# Patient Record
Sex: FEMALE | Race: WHITE | NOT HISPANIC OR LATINO | Employment: FULL TIME | URBAN - METROPOLITAN AREA
[De-identification: names, ages, dates, MRNs, and addresses within clinical notes are randomized per-mention and may not be internally consistent; named-entity substitution may affect disease eponyms.]

---

## 2017-01-05 ENCOUNTER — ALLSCRIPTS OFFICE VISIT (OUTPATIENT)
Dept: OTHER | Facility: OTHER | Age: 27
End: 2017-01-05

## 2017-01-06 ENCOUNTER — GENERIC CONVERSION - ENCOUNTER (OUTPATIENT)
Dept: OTHER | Facility: OTHER | Age: 27
End: 2017-01-06

## 2017-01-06 LAB — PROGESTERONE LEVEL (HISTORICAL): 7.5 NG/ML

## 2017-01-16 ENCOUNTER — ALLSCRIPTS OFFICE VISIT (OUTPATIENT)
Dept: OTHER | Facility: OTHER | Age: 27
End: 2017-01-16

## 2017-01-17 ENCOUNTER — GENERIC CONVERSION - ENCOUNTER (OUTPATIENT)
Dept: OTHER | Facility: OTHER | Age: 27
End: 2017-01-17

## 2017-01-17 LAB
HCG QUANTITATIVE (HISTORICAL): <2 MIU/ML
PROGESTERONE LEVEL (HISTORICAL): 14.3 NG/ML

## 2017-01-23 ENCOUNTER — ALLSCRIPTS OFFICE VISIT (OUTPATIENT)
Dept: OTHER | Facility: OTHER | Age: 27
End: 2017-01-23

## 2017-02-10 ENCOUNTER — ALLSCRIPTS OFFICE VISIT (OUTPATIENT)
Dept: OTHER | Facility: OTHER | Age: 27
End: 2017-02-10

## 2017-02-13 ENCOUNTER — LAB CONVERSION - ENCOUNTER (OUTPATIENT)
Dept: OTHER | Facility: OTHER | Age: 27
End: 2017-02-13

## 2017-02-13 LAB
CONTACT: (HISTORICAL): NORMAL
HCG QUANTITATIVE (HISTORICAL): <2 MIU/ML
PROGESTERONE LEVEL (HISTORICAL): 2.1 NG/ML
TEST INFORMATION (HISTORICAL): NORMAL
TEST NAME (HISTORICAL): NORMAL

## 2017-02-16 ENCOUNTER — GENERIC CONVERSION - ENCOUNTER (OUTPATIENT)
Dept: OTHER | Facility: OTHER | Age: 27
End: 2017-02-16

## 2017-02-21 ENCOUNTER — ALLSCRIPTS OFFICE VISIT (OUTPATIENT)
Dept: OTHER | Facility: OTHER | Age: 27
End: 2017-02-21

## 2017-02-22 LAB
HCG QUANTITATIVE (HISTORICAL): 114 MIU/ML
PROGESTERONE LEVEL (HISTORICAL): 32 NG/ML

## 2017-02-23 ENCOUNTER — ALLSCRIPTS OFFICE VISIT (OUTPATIENT)
Dept: OTHER | Facility: OTHER | Age: 27
End: 2017-02-23

## 2017-02-24 ENCOUNTER — GENERIC CONVERSION - ENCOUNTER (OUTPATIENT)
Dept: OTHER | Facility: OTHER | Age: 27
End: 2017-02-24

## 2017-02-24 LAB
HCG QUANTITATIVE (HISTORICAL): 431 MIU/ML
PROGESTERONE LEVEL (HISTORICAL): 28 NG/ML

## 2017-03-03 ENCOUNTER — LAB (OUTPATIENT)
Dept: LAB | Facility: MEDICAL CENTER | Age: 27
End: 2017-03-03
Payer: COMMERCIAL

## 2017-03-03 ENCOUNTER — TRANSCRIBE ORDERS (OUTPATIENT)
Dept: ADMINISTRATIVE | Facility: HOSPITAL | Age: 27
End: 2017-03-03

## 2017-03-03 DIAGNOSIS — Z32.01 PREGNANCY EXAMINATION OR TEST, POSITIVE RESULT: Primary | ICD-10-CM

## 2017-03-03 DIAGNOSIS — Z32.01 PREGNANCY EXAMINATION OR TEST, POSITIVE RESULT: ICD-10-CM

## 2017-03-03 LAB
HCG SERPL QL: POSITIVE
PROGEST SERPL-MCNC: 31.8 NG/ML

## 2017-03-03 PROCEDURE — 84144 ASSAY OF PROGESTERONE: CPT

## 2017-03-03 PROCEDURE — 36415 COLL VENOUS BLD VENIPUNCTURE: CPT

## 2017-03-03 PROCEDURE — 84702 CHORIONIC GONADOTROPIN TEST: CPT

## 2017-03-06 ENCOUNTER — GENERIC CONVERSION - ENCOUNTER (OUTPATIENT)
Dept: OTHER | Facility: OTHER | Age: 27
End: 2017-03-06

## 2017-03-06 LAB — B-HCG SERPL-ACNC: 7146 MIU/ML

## 2017-03-07 ENCOUNTER — GENERIC CONVERSION - ENCOUNTER (OUTPATIENT)
Dept: OTHER | Facility: OTHER | Age: 27
End: 2017-03-07

## 2017-03-07 DIAGNOSIS — O36.80X0 PREGNANCY WITH INCONCLUSIVE FETAL VIABILITY: ICD-10-CM

## 2017-03-22 ENCOUNTER — HOSPITAL ENCOUNTER (OUTPATIENT)
Dept: ULTRASOUND IMAGING | Facility: MEDICAL CENTER | Age: 27
Discharge: HOME | End: 2017-03-22
Payer: COMMERCIAL

## 2017-03-22 DIAGNOSIS — O36.80X0 PREGNANCY WITH INCONCLUSIVE FETAL VIABILITY: ICD-10-CM

## 2017-03-22 PROCEDURE — 76801 OB US < 14 WKS SINGLE FETUS: CPT

## 2017-03-24 ENCOUNTER — GENERIC CONVERSION - ENCOUNTER (OUTPATIENT)
Dept: OTHER | Facility: OTHER | Age: 27
End: 2017-03-24

## 2017-03-31 ENCOUNTER — ALLSCRIPTS OFFICE VISIT (OUTPATIENT)
Dept: OTHER | Facility: OTHER | Age: 27
End: 2017-03-31

## 2017-04-01 ENCOUNTER — LAB CONVERSION - ENCOUNTER (OUTPATIENT)
Dept: OTHER | Facility: OTHER | Age: 27
End: 2017-04-01

## 2017-04-01 LAB — CULTURE RESULT (HISTORICAL): NORMAL

## 2017-04-02 ENCOUNTER — LAB CONVERSION - ENCOUNTER (OUTPATIENT)
Dept: OTHER | Facility: OTHER | Age: 27
End: 2017-04-02

## 2017-04-02 LAB — CULTURE RESULT (HISTORICAL): NORMAL

## 2017-04-03 ENCOUNTER — LAB CONVERSION - ENCOUNTER (OUTPATIENT)
Dept: OTHER | Facility: OTHER | Age: 27
End: 2017-04-03

## 2017-04-03 LAB
AB SCRN, RBC W/RFLX ID,TITER,AG (HISTORICAL): ABNORMAL
ABO GROUP BLD: ABNORMAL
AMORPHOUS MATERIAL (HISTORICAL): ABNORMAL /HPF
BACTERIA UR QL AUTO: ABNORMAL /HPF
BASOPHILS # BLD AUTO: 1.3 %
BASOPHILS # BLD AUTO: 166 CELLS/UL (ref 0–200)
BILIRUB UR QL STRIP: NEGATIVE
COLOR UR: YELLOW
COMMENT (HISTORICAL): ABNORMAL
CULTURE RESULT (HISTORICAL): NORMAL
DEPRECATED RDW RBC AUTO: 14.6 % (ref 11–15)
EOSINOPHIL # BLD AUTO: 0.6 %
EOSINOPHIL # BLD AUTO: 77 CELLS/UL (ref 15–500)
FECAL OCCULT BLOOD DIAGNOSTIC (HISTORICAL): NEGATIVE
GLUCOSE (HISTORICAL): NEGATIVE
HCG QUANTITATIVE (HISTORICAL): ABNORMAL MIU/ML
HCT VFR BLD AUTO: 39.2 % (ref 35–45)
HEPATITIS B SURFACE ANTIGEN (HISTORICAL): ABNORMAL
HGB BLD-MCNC: 13.1 G/DL (ref 11.7–15.5)
HIV AG/AB, 4TH GEN (HISTORICAL): ABNORMAL
HYALINE CASTS #/AREA URNS LPF: ABNORMAL /LPF
KETONES UR STRIP-MCNC: NEGATIVE MG/DL
LEUKOCYTE ESTERASE UR QL STRIP: NEGATIVE
LYMPHOCYTES # BLD AUTO: 18.1 %
LYMPHOCYTES # BLD AUTO: 2317 CELLS/UL (ref 850–3900)
MCH RBC QN AUTO: 27.6 PG (ref 27–33)
MCHC RBC AUTO-ENTMCNC: 33.4 G/DL (ref 32–36)
MCV RBC AUTO: 82.6 FL (ref 80–100)
MONOCYTES # BLD AUTO: 691 CELLS/UL (ref 200–950)
MONOCYTES (HISTORICAL): 5.4 %
NEUTROPHILS # BLD AUTO: 74.6 %
NEUTROPHILS # BLD AUTO: 9549 CELLS/UL (ref 1500–7800)
NITRITE UR QL STRIP: NEGATIVE
PH UR STRIP.AUTO: 7 [PH] (ref 5–8)
PLATELET # BLD AUTO: 268 THOUSAND/UL (ref 140–400)
PMV BLD AUTO: 8.9 FL (ref 7.5–12.5)
PROGESTERONE LEVEL (HISTORICAL): 33.9 NG/ML
PROT UR STRIP-MCNC: NEGATIVE MG/DL
RBC # BLD AUTO: 4.75 MILLION/UL (ref 3.8–5.1)
RBC (HISTORICAL): ABNORMAL /HPF
RH BLD: ABNORMAL
RPR SCREEN (HISTORICAL): ABNORMAL
RUBELLA, IGG (HISTORICAL): 1.87 INDEX
SP GR UR STRIP.AUTO: 1.02 (ref 1–1.03)
SQUAMOUS EPITHELIAL CELLS (HISTORICAL): ABNORMAL /HPF
WBC # BLD AUTO: 12.8 THOUSAND/UL (ref 3.8–10.8)
WBC # BLD AUTO: ABNORMAL /HPF

## 2017-04-06 ENCOUNTER — GENERIC CONVERSION - ENCOUNTER (OUTPATIENT)
Dept: OTHER | Facility: OTHER | Age: 27
End: 2017-04-06

## 2017-04-24 ENCOUNTER — ALLSCRIPTS OFFICE VISIT (OUTPATIENT)
Dept: PERINATAL CARE | Facility: CLINIC | Age: 27
End: 2017-04-24
Payer: COMMERCIAL

## 2017-04-24 ENCOUNTER — GENERIC CONVERSION - ENCOUNTER (OUTPATIENT)
Dept: OTHER | Facility: OTHER | Age: 27
End: 2017-04-24

## 2017-04-24 PROCEDURE — 76813 OB US NUCHAL MEAS 1 GEST: CPT | Performed by: OBSTETRICS & GYNECOLOGY

## 2017-04-26 ENCOUNTER — LAB CONVERSION - ENCOUNTER (OUTPATIENT)
Dept: OTHER | Facility: OTHER | Age: 27
End: 2017-04-26

## 2017-04-26 LAB — CLINICAL COMMENT (HISTORICAL): NORMAL

## 2017-04-28 ENCOUNTER — LAB CONVERSION - ENCOUNTER (OUTPATIENT)
Dept: OTHER | Facility: OTHER | Age: 27
End: 2017-04-28

## 2017-04-28 LAB
AGE RISK DOWN SYNDROME (HISTORICAL): NORMAL
CALC'D GESTATIONAL AGE (HISTORICAL): 13
COLLECTION DATE (HISTORICAL): NORMAL
CROWN RUMP LENGTH (HISTORICAL): 70 MM
CROWN RUMP LENGTH (HISTORICAL): NORMAL MM
DATE OF BIRTH (HISTORICAL): NORMAL
DONOR AGE; EGG RETRIEVAL (HISTORICAL): NORMAL
DONOR EGG (HISTORICAL): NO
EDD DETERMINED BY (HISTORICAL): NORMAL
ESTIMATED DELIVERY DATE (EDD) (HISTORICAL): NORMAL
HCG MOM (HISTORICAL): 1.42
HCG QUANTITATIVE (HISTORICAL): 114.5 IU/ML
HX OF NEURAL TUBE DEFECTS (HISTORICAL): YES
IF TWINS (HISTORICAL): NORMAL
INSULIN DEP. DIABETIC (HISTORICAL): NO
INTERPRETATION (HISTORICAL): NORMAL
MATERNAL WEIGHT (HISTORICAL): 152 LBS
MSS DOWN SYNDROME RISK (HISTORICAL): NORMAL
MSS3 TRISOMY 18 RISK (HISTORICAL): NORMAL
NASAL BONE (HISTORICAL): NORMAL
NASAL BONE (HISTORICAL): PRESENT
NT MOM (HISTORICAL): 1.29
NTQR LOCATION ID (HISTORICAL): NORMAL
NTQR READING PHYS ID (HISTORICAL): NORMAL
NUCHAL TRANSLUCENCY (HISTORICAL): 2 MM
NUCHAL TRANSLUCENCY (HISTORICAL): NORMAL MM
NUMBER OF FETUSES (HISTORICAL): 1
PAPP-A (HISTORICAL): 1.25
PAPP-A (HISTORICAL): 1236.1 NG/ML
PREV PREGNANCY DOWN SYND (HISTORICAL): NO
RACE/ETHNIC ORIGIN (HISTORICAL): NORMAL
REFERRING PHYSICIAN (HISTORICAL): NORMAL
REFERRING PHYSICIAN NPI (HISTORICAL): NORMAL
REFERRING PHYSICIAN PHONE (HISTORICAL): NORMAL
REPEAT SPECIMEN (HISTORICAL): NO
ULTRASONOGRAPHER ID (HISTORICAL): NORMAL
ULTRASOUND DATE (HISTORICAL): NORMAL

## 2017-05-05 ENCOUNTER — GENERIC CONVERSION - ENCOUNTER (OUTPATIENT)
Dept: OTHER | Facility: OTHER | Age: 27
End: 2017-05-05

## 2017-05-23 ENCOUNTER — GENERIC CONVERSION - ENCOUNTER (OUTPATIENT)
Dept: OTHER | Facility: OTHER | Age: 27
End: 2017-05-23

## 2017-05-27 ENCOUNTER — LAB CONVERSION - ENCOUNTER (OUTPATIENT)
Dept: OTHER | Facility: OTHER | Age: 27
End: 2017-05-27

## 2017-05-27 LAB
AFP (HISTORICAL): 1.15
AFP (HISTORICAL): 43 NG/ML
AGE RISK DOWN SYNDROME (HISTORICAL): NORMAL
CALC'D GESTATIONAL AGE (HISTORICAL): 17
COLLECTION DATE (HISTORICAL): NORMAL
CROWN RUMP LENGTH (HISTORICAL): 70 MM
DATE OF BIRTH (HISTORICAL): NORMAL
ESTIMATED DELIVERY DATE (EDD) (HISTORICAL): NORMAL
ESTRADIOL, FREE (HISTORICAL): 0.59 NG/ML
ESTRIOL MOM (HISTORICAL): 0.55
HCG MOM (HISTORICAL): 1.7
HCG QUANTITATIVE (HISTORICAL): 48.9 IU/ML
HX OF NEURAL TUBE DEFECTS (HISTORICAL): YES
INHIBIN A (HISTORICAL): 162 PG/ML
INHIBIN A MOM (HISTORICAL): 0.98
INSULIN DEP. DIABETIC (HISTORICAL): NO
INTERPRETATION (HISTORICAL): NORMAL
MATERNAL WEIGHT (HISTORICAL): 149 LBS
MSAFP RISK OPEN NTD (HISTORICAL): NORMAL
MSS DOWN SYNDROME RISK (HISTORICAL): NORMAL
MSS3 TRISOMY 18 RISK (HISTORICAL): NORMAL
NASAL BONE (HISTORICAL): NORMAL
NASAL BONE (HISTORICAL): PRESENT
NT MOM (HISTORICAL): 1.29
NUCHAL TRANSLUCENCY (HISTORICAL): 2 MM
NUMBER OF FETUSES (HISTORICAL): 1
PAPP-A (HISTORICAL): 1.22
PAPP-A (HISTORICAL): 1236.1 NG/ML
RACE/ETHNIC ORIGIN (HISTORICAL): NORMAL
REFERRING PHYSICIAN (HISTORICAL): NORMAL
REFERRING PHYSICIAN NPI (HISTORICAL): NORMAL
REFERRING PHYSICIAN PHONE (HISTORICAL): NORMAL
REPEAT SPECIMEN (HISTORICAL): NO
SPECIMEN: (HISTORICAL): NORMAL
ULTRASOUND DATE (HISTORICAL): NORMAL

## 2017-06-05 ENCOUNTER — GENERIC CONVERSION - ENCOUNTER (OUTPATIENT)
Dept: OTHER | Facility: OTHER | Age: 27
End: 2017-06-05

## 2017-06-10 ENCOUNTER — LAB CONVERSION - ENCOUNTER (OUTPATIENT)
Dept: OTHER | Facility: OTHER | Age: 27
End: 2017-06-10

## 2017-06-10 LAB — CULTURE RESULT (HISTORICAL): NORMAL

## 2017-06-15 ENCOUNTER — ALLSCRIPTS OFFICE VISIT (OUTPATIENT)
Dept: PERINATAL CARE | Facility: CLINIC | Age: 27
End: 2017-06-15
Payer: COMMERCIAL

## 2017-06-15 ENCOUNTER — GENERIC CONVERSION - ENCOUNTER (OUTPATIENT)
Dept: OTHER | Facility: OTHER | Age: 27
End: 2017-06-15

## 2017-06-15 PROCEDURE — 76817 TRANSVAGINAL US OBSTETRIC: CPT | Performed by: OBSTETRICS & GYNECOLOGY

## 2017-06-15 PROCEDURE — 76805 OB US >/= 14 WKS SNGL FETUS: CPT | Performed by: OBSTETRICS & GYNECOLOGY

## 2017-06-20 ENCOUNTER — GENERIC CONVERSION - ENCOUNTER (OUTPATIENT)
Dept: OTHER | Facility: OTHER | Age: 27
End: 2017-06-20

## 2017-07-18 ENCOUNTER — GENERIC CONVERSION - ENCOUNTER (OUTPATIENT)
Dept: OTHER | Facility: OTHER | Age: 27
End: 2017-07-18

## 2017-08-08 ENCOUNTER — GENERIC CONVERSION - ENCOUNTER (OUTPATIENT)
Dept: OTHER | Facility: OTHER | Age: 27
End: 2017-08-08

## 2017-08-08 ENCOUNTER — ALLSCRIPTS OFFICE VISIT (OUTPATIENT)
Dept: OTHER | Facility: OTHER | Age: 27
End: 2017-08-08

## 2017-08-09 ENCOUNTER — LAB CONVERSION - ENCOUNTER (OUTPATIENT)
Dept: OTHER | Facility: OTHER | Age: 27
End: 2017-08-09

## 2017-08-09 LAB — GLUCOSE 1 HR 50 GM GLUC CHALLENGE-PREG PTS (HISTORICAL): 128 MG/DL

## 2017-08-10 ENCOUNTER — LAB CONVERSION - ENCOUNTER (OUTPATIENT)
Dept: OTHER | Facility: OTHER | Age: 27
End: 2017-08-10

## 2017-08-10 LAB
BASOPHILS # BLD AUTO: 0.4 %
BASOPHILS # BLD AUTO: 36 CELLS/UL (ref 0–200)
DEPRECATED RDW RBC AUTO: 13.1 % (ref 11–15)
EOSINOPHIL # BLD AUTO: 1.1 %
EOSINOPHIL # BLD AUTO: 100 CELLS/UL (ref 15–500)
GLUCOSE 1 HR 50 GM GLUC CHALLENGE-PREG PTS (HISTORICAL): 128 MG/DL
HCT VFR BLD AUTO: 38.6 % (ref 35–45)
HGB BLD-MCNC: 12.7 G/DL (ref 11.7–15.5)
LYMPHOCYTES # BLD AUTO: 1884 CELLS/UL (ref 850–3900)
LYMPHOCYTES # BLD AUTO: 20.7 %
MCH RBC QN AUTO: 28.5 PG (ref 27–33)
MCHC RBC AUTO-ENTMCNC: 32.9 G/DL (ref 32–36)
MCV RBC AUTO: 86.5 FL (ref 80–100)
MONOCYTES # BLD AUTO: 400 CELLS/UL (ref 200–950)
MONOCYTES (HISTORICAL): 4.4 %
NEUTROPHILS # BLD AUTO: 6679 CELLS/UL (ref 1500–7800)
NEUTROPHILS # BLD AUTO: 73.4 %
PLATELET # BLD AUTO: 264 THOUSAND/UL (ref 140–400)
PMV BLD AUTO: 10.3 FL (ref 7.5–12.5)
RBC # BLD AUTO: 4.46 MILLION/UL (ref 3.8–5.1)
RBC MORPHOLOGY (HISTORICAL): NORMAL
WBC # BLD AUTO: 9.1 THOUSAND/UL (ref 3.8–10.8)

## 2017-08-24 ENCOUNTER — GENERIC CONVERSION - ENCOUNTER (OUTPATIENT)
Dept: OTHER | Facility: OTHER | Age: 27
End: 2017-08-24

## 2017-09-06 ENCOUNTER — GENERIC CONVERSION - ENCOUNTER (OUTPATIENT)
Dept: OTHER | Facility: OTHER | Age: 27
End: 2017-09-06

## 2017-09-06 ENCOUNTER — ALLSCRIPTS OFFICE VISIT (OUTPATIENT)
Dept: OTHER | Facility: OTHER | Age: 27
End: 2017-09-06

## 2017-09-07 ENCOUNTER — ALLSCRIPTS OFFICE VISIT (OUTPATIENT)
Dept: PERINATAL CARE | Facility: CLINIC | Age: 27
End: 2017-09-07
Payer: COMMERCIAL

## 2017-09-07 ENCOUNTER — GENERIC CONVERSION - ENCOUNTER (OUTPATIENT)
Dept: OTHER | Facility: OTHER | Age: 27
End: 2017-09-07

## 2017-09-07 PROCEDURE — 76816 OB US FOLLOW-UP PER FETUS: CPT | Performed by: OBSTETRICS & GYNECOLOGY

## 2017-09-19 ENCOUNTER — GENERIC CONVERSION - ENCOUNTER (OUTPATIENT)
Dept: OTHER | Facility: OTHER | Age: 27
End: 2017-09-19

## 2017-09-25 ENCOUNTER — GENERIC CONVERSION - ENCOUNTER (OUTPATIENT)
Dept: OTHER | Facility: OTHER | Age: 27
End: 2017-09-25

## 2017-09-26 ENCOUNTER — GENERIC CONVERSION - ENCOUNTER (OUTPATIENT)
Dept: OTHER | Facility: OTHER | Age: 27
End: 2017-09-26

## 2017-09-27 ENCOUNTER — GENERIC CONVERSION - ENCOUNTER (OUTPATIENT)
Dept: OTHER | Facility: OTHER | Age: 27
End: 2017-09-27

## 2017-10-05 ENCOUNTER — ALLSCRIPTS OFFICE VISIT (OUTPATIENT)
Dept: OTHER | Facility: OTHER | Age: 27
End: 2017-10-05

## 2017-10-10 ENCOUNTER — GENERIC CONVERSION - ENCOUNTER (OUTPATIENT)
Dept: OTHER | Facility: OTHER | Age: 27
End: 2017-10-10

## 2017-10-17 ENCOUNTER — GENERIC CONVERSION - ENCOUNTER (OUTPATIENT)
Dept: OTHER | Facility: OTHER | Age: 27
End: 2017-10-17

## 2017-10-24 ENCOUNTER — GENERIC CONVERSION - ENCOUNTER (OUTPATIENT)
Dept: OTHER | Facility: OTHER | Age: 27
End: 2017-10-24

## 2017-10-24 ENCOUNTER — HOSPITAL ENCOUNTER (OUTPATIENT)
Facility: HOSPITAL | Age: 27
Discharge: HOME | End: 2017-10-24
Attending: OBSTETRICS & GYNECOLOGY | Admitting: OBSTETRICS & GYNECOLOGY
Payer: COMMERCIAL

## 2017-10-24 VITALS
TEMPERATURE: 98.1 F | RESPIRATION RATE: 18 BRPM | SYSTOLIC BLOOD PRESSURE: 123 MMHG | DIASTOLIC BLOOD PRESSURE: 64 MMHG | HEART RATE: 88 BPM | HEIGHT: 63 IN | BODY MASS INDEX: 31.89 KG/M2 | WEIGHT: 180 LBS

## 2017-10-24 LAB
ALBUMIN SERPL BCP-MCNC: 2.8 G/DL (ref 3.5–5)
ALP SERPL-CCNC: 157 U/L (ref 46–116)
ALT SERPL W P-5'-P-CCNC: 23 U/L (ref 12–78)
AMORPH PHOS CRY URNS QL MICRO: ABNORMAL /HPF
ANION GAP SERPL CALCULATED.3IONS-SCNC: 9 MMOL/L (ref 4–13)
AST SERPL W P-5'-P-CCNC: 24 U/L (ref 5–45)
BACTERIA UR QL AUTO: ABNORMAL /HPF
BASOPHILS # BLD AUTO: 0.03 THOUSANDS/ΜL (ref 0–0.1)
BASOPHILS NFR BLD AUTO: 0 % (ref 0–1)
BILIRUB SERPL-MCNC: 0.23 MG/DL (ref 0.2–1)
BILIRUB UR QL STRIP: NEGATIVE
BUN SERPL-MCNC: 11 MG/DL (ref 5–25)
CALCIUM SERPL-MCNC: 9.3 MG/DL (ref 8.3–10.1)
CHLORIDE SERPL-SCNC: 102 MMOL/L (ref 100–108)
CLARITY UR: CLEAR
CO2 SERPL-SCNC: 23 MMOL/L (ref 21–32)
COLOR UR: YELLOW
CREAT SERPL-MCNC: 0.71 MG/DL (ref 0.6–1.3)
CREAT UR-MCNC: 88.9 MG/DL
EOSINOPHIL # BLD AUTO: 0.07 THOUSAND/ΜL (ref 0–0.61)
EOSINOPHIL NFR BLD AUTO: 1 % (ref 0–6)
ERYTHROCYTE [DISTWIDTH] IN BLOOD BY AUTOMATED COUNT: 13.9 % (ref 11.6–15.1)
GFR SERPL CREATININE-BSD FRML MDRD: 115 ML/MIN/1.73SQ M
GLUCOSE SERPL-MCNC: 106 MG/DL (ref 65–140)
GLUCOSE UR STRIP-MCNC: NEGATIVE MG/DL
HCT VFR BLD AUTO: 41.2 % (ref 34.8–46.1)
HGB BLD-MCNC: 13.6 G/DL (ref 11.5–15.4)
HGB UR QL STRIP.AUTO: ABNORMAL
KETONES UR STRIP-MCNC: NEGATIVE MG/DL
LEUKOCYTE ESTERASE UR QL STRIP: NEGATIVE
LYMPHOCYTES # BLD AUTO: 1.87 THOUSANDS/ΜL (ref 0.6–4.47)
LYMPHOCYTES NFR BLD AUTO: 19 % (ref 14–44)
MCH RBC QN AUTO: 28.6 PG (ref 26.8–34.3)
MCHC RBC AUTO-ENTMCNC: 33 G/DL (ref 31.4–37.4)
MCV RBC AUTO: 87 FL (ref 82–98)
MONOCYTES # BLD AUTO: 0.59 THOUSAND/ΜL (ref 0.17–1.22)
MONOCYTES NFR BLD AUTO: 6 % (ref 4–12)
NEUTROPHILS # BLD AUTO: 7.32 THOUSANDS/ΜL (ref 1.85–7.62)
NEUTS SEG NFR BLD AUTO: 74 % (ref 43–75)
NITRITE UR QL STRIP: NEGATIVE
NON-SQ EPI CELLS URNS QL MICRO: ABNORMAL /HPF
NRBC BLD AUTO-RTO: 0 /100 WBCS
PH UR STRIP.AUTO: 7 [PH] (ref 4.5–8)
PLATELET # BLD AUTO: 243 THOUSANDS/UL (ref 149–390)
PMV BLD AUTO: 10.5 FL (ref 8.9–12.7)
POTASSIUM SERPL-SCNC: 3.9 MMOL/L (ref 3.5–5.3)
PROT SERPL-MCNC: 7.1 G/DL (ref 6.4–8.2)
PROT UR STRIP-MCNC: NEGATIVE MG/DL
PROT UR-MCNC: 10 MG/DL
PROT/CREAT UR: 0.11 MG/G{CREAT} (ref 0–0.1)
RBC # BLD AUTO: 4.76 MILLION/UL (ref 3.81–5.12)
RBC #/AREA URNS AUTO: ABNORMAL /HPF
SODIUM SERPL-SCNC: 134 MMOL/L (ref 136–145)
SP GR UR STRIP.AUTO: 1.01 (ref 1–1.03)
URATE SERPL-MCNC: 4.6 MG/DL (ref 2–6.8)
UROBILINOGEN UR QL STRIP.AUTO: 0.2 E.U./DL
WBC # BLD AUTO: 9.88 THOUSAND/UL (ref 4.31–10.16)
WBC #/AREA URNS AUTO: ABNORMAL /HPF

## 2017-10-24 PROCEDURE — 82570 ASSAY OF URINE CREATININE: CPT | Performed by: OBSTETRICS & GYNECOLOGY

## 2017-10-24 PROCEDURE — 99213 OFFICE O/P EST LOW 20 MIN: CPT

## 2017-10-24 PROCEDURE — 85025 COMPLETE CBC W/AUTO DIFF WBC: CPT | Performed by: OBSTETRICS & GYNECOLOGY

## 2017-10-24 PROCEDURE — 81001 URINALYSIS AUTO W/SCOPE: CPT | Performed by: OBSTETRICS & GYNECOLOGY

## 2017-10-24 PROCEDURE — 80053 COMPREHEN METABOLIC PANEL: CPT | Performed by: OBSTETRICS & GYNECOLOGY

## 2017-10-24 PROCEDURE — 84156 ASSAY OF PROTEIN URINE: CPT | Performed by: OBSTETRICS & GYNECOLOGY

## 2017-10-24 PROCEDURE — 84550 ASSAY OF BLOOD/URIC ACID: CPT | Performed by: OBSTETRICS & GYNECOLOGY

## 2017-10-24 RX ORDER — PNV NO.95/FERROUS FUM/FOLIC AC 28MG-0.8MG
1 TABLET ORAL DAILY
Status: ON HOLD | COMMUNITY
End: 2017-10-25 | Stop reason: SDUPTHER

## 2017-10-24 NOTE — PROGRESS NOTES
L&D Triage Note - OB/GYN  Cristian Saavedra 32 y o  female MRN: [id number]  Unit/Bed#: L&D 326-01 Encounter: [id number]      Assessment:  32 y o   at 39w0d without evidence of preeclampsia  Plan:  1  Discharged to home with precautions  2  Follow up as outpatient     DW Dr Hilario Walsh  ______________________________________________________________________      Chief Compliant: high BP in office    TIME: 8937  Subjective:  32 y o  Nilsa Joy at 39w0d presents from her OB's office with elevated BPs (140s/90s x2)  Patient has no known hx of HTN and her BP had previously been WNL in office  At present, she denies headaches, visual changes, and RUQ pain  She also denies regular ctxns, lof, vb  +FM  Objective:  Vitals:    10/24/17 1205   BP: 131/72   Pulse: 87   Resp: 18   Temp: 98 1 °F (36 7 °C)       FHT:  135 / Moderate 6 - 25 bpm / +accels, reactive  [place]: q6-9min    A/P: 32 y o   at 39w0d with new onset HTN  Asymptomatic  1  CBC, CMP, uric acid, UA, urine protein:creatinine  2  BP monitoring          TIME: 1400  Subjective:  No new symptoms      Objective:  Temp:  [98 1 °F (36 7 °C)] 98 1 °F (36 7 °C)  HR:  [87] 87  Resp:  [18] 18  BP: (131)/(72) 131/72       FHT:  125 / Moderate 6 - 25 bpm / +accels, reactive  [place]: irregular    Lab Results   Component Value Date    WBC 9 88 10/24/2017    HGB 13 6 10/24/2017    HCT 41 2 10/24/2017    MCV 87 10/24/2017     10/24/2017     Lab Results   Component Value Date    GLUCOSE 106 10/24/2017    CALCIUM 9 3 10/24/2017     (L) 10/24/2017    K 3 9 10/24/2017    CO2 23 10/24/2017     10/24/2017    BUN 11 10/24/2017    CREATININE 0 71 10/24/2017     Lab Results   Component Value Date    ALT 23 10/24/2017    AST 24 10/24/2017    ALKPHOS 157 (H) 10/24/2017    BILITOT 0 23 10/24/2017     UA: neg protein          Cruzito Feldman MD 10/24/2017 1:59 PM

## 2017-10-25 ENCOUNTER — ANESTHESIA EVENT (INPATIENT)
Dept: LABOR AND DELIVERY | Facility: HOSPITAL | Age: 27
End: 2017-10-25
Payer: COMMERCIAL

## 2017-10-25 ENCOUNTER — ANESTHESIA (INPATIENT)
Dept: LABOR AND DELIVERY | Facility: HOSPITAL | Age: 27
End: 2017-10-25
Payer: COMMERCIAL

## 2017-10-25 ENCOUNTER — HOSPITAL ENCOUNTER (INPATIENT)
Facility: HOSPITAL | Age: 27
LOS: 2 days | Discharge: HOME | End: 2017-10-27
Attending: OBSTETRICS & GYNECOLOGY | Admitting: OBSTETRICS & GYNECOLOGY
Payer: COMMERCIAL

## 2017-10-25 DIAGNOSIS — O13.9 GESTATIONAL HYPERTENSION: ICD-10-CM

## 2017-10-25 DIAGNOSIS — Z3A.39 39 WEEKS GESTATION OF PREGNANCY: Primary | ICD-10-CM

## 2017-10-25 LAB
ABO GROUP BLD: NORMAL
ALBUMIN SERPL BCP-MCNC: 2.8 G/DL (ref 3.5–5)
ALP SERPL-CCNC: 152 U/L (ref 46–116)
ALT SERPL W P-5'-P-CCNC: 18 U/L (ref 12–78)
ANION GAP SERPL CALCULATED.3IONS-SCNC: 9 MMOL/L (ref 4–13)
AST SERPL W P-5'-P-CCNC: 22 U/L (ref 5–45)
BACTERIA UR QL AUTO: ABNORMAL /HPF
BASE EXCESS BLDCOA CALC-SCNC: -3.3 MMOL/L (ref 3–11)
BASE EXCESS BLDCOV CALC-SCNC: -2.7 MMOL/L (ref 1–9)
BASOPHILS # BLD AUTO: 0.02 THOUSANDS/ΜL (ref 0–0.1)
BASOPHILS NFR BLD AUTO: 0 % (ref 0–1)
BILIRUB SERPL-MCNC: 0.25 MG/DL (ref 0.2–1)
BILIRUB UR QL STRIP: NEGATIVE
BLD GP AB SCN SERPL QL: NEGATIVE
BUN SERPL-MCNC: 11 MG/DL (ref 5–25)
CALCIUM SERPL-MCNC: 9.6 MG/DL (ref 8.3–10.1)
CHLORIDE SERPL-SCNC: 104 MMOL/L (ref 100–108)
CLARITY UR: ABNORMAL
CO2 SERPL-SCNC: 22 MMOL/L (ref 21–32)
COLOR UR: YELLOW
CREAT SERPL-MCNC: 0.68 MG/DL (ref 0.6–1.3)
CREAT UR-MCNC: 117 MG/DL
EOSINOPHIL # BLD AUTO: 0.12 THOUSAND/ΜL (ref 0–0.61)
EOSINOPHIL NFR BLD AUTO: 1 % (ref 0–6)
ERYTHROCYTE [DISTWIDTH] IN BLOOD BY AUTOMATED COUNT: 13.9 % (ref 11.6–15.1)
EXTERNAL GROUP B STREP ANTIGEN: NEGATIVE
GFR SERPL CREATININE-BSD FRML MDRD: 119 ML/MIN/1.73SQ M
GLUCOSE SERPL-MCNC: 85 MG/DL (ref 65–140)
GLUCOSE UR STRIP-MCNC: NEGATIVE MG/DL
HCO3 BLDCOA-SCNC: 26 MMOL/L (ref 17.3–27.3)
HCO3 BLDCOV-SCNC: 23.9 MMOL/L (ref 12.2–28.6)
HCT VFR BLD AUTO: 38.3 % (ref 34.8–46.1)
HGB BLD-MCNC: 12.9 G/DL (ref 11.5–15.4)
HGB UR QL STRIP.AUTO: ABNORMAL
KETONES UR STRIP-MCNC: NEGATIVE MG/DL
LEUKOCYTE ESTERASE UR QL STRIP: ABNORMAL
LYMPHOCYTES # BLD AUTO: 2 THOUSANDS/ΜL (ref 0.6–4.47)
LYMPHOCYTES NFR BLD AUTO: 18 % (ref 14–44)
MCH RBC QN AUTO: 29.1 PG (ref 26.8–34.3)
MCHC RBC AUTO-ENTMCNC: 33.7 G/DL (ref 31.4–37.4)
MCV RBC AUTO: 87 FL (ref 82–98)
MONOCYTES # BLD AUTO: 0.78 THOUSAND/ΜL (ref 0.17–1.22)
MONOCYTES NFR BLD AUTO: 7 % (ref 4–12)
NEUTROPHILS # BLD AUTO: 7.96 THOUSANDS/ΜL (ref 1.85–7.62)
NEUTS SEG NFR BLD AUTO: 74 % (ref 43–75)
NITRITE UR QL STRIP: NEGATIVE
NON-SQ EPI CELLS URNS QL MICRO: ABNORMAL /HPF
NRBC BLD AUTO-RTO: 0 /100 WBCS
O2 CT VFR BLDCOA CALC: 6.7 ML/DL
OXYHGB MFR BLDCOA: 27.2 %
OXYHGB MFR BLDCOV: 39.6 %
PCO2 BLDCOA: 63.4 MM[HG] (ref 30–60)
PCO2 BLDCOV: 47.8 MM HG (ref 27–43)
PH BLDCOA: 7.23 [PH] (ref 7.23–7.43)
PH BLDCOV: 7.32 [PH] (ref 7.19–7.49)
PH UR STRIP.AUTO: 7 [PH] (ref 4.5–8)
PLATELET # BLD AUTO: 254 THOUSANDS/UL (ref 149–390)
PMV BLD AUTO: 10.4 FL (ref 8.9–12.7)
PO2 BLDCOA: 17.1 MM HG (ref 5–25)
PO2 BLDCOV: 19.2 MM HG (ref 15–45)
POTASSIUM SERPL-SCNC: 4 MMOL/L (ref 3.5–5.3)
PROT SERPL-MCNC: 6.9 G/DL (ref 6.4–8.2)
PROT UR STRIP-MCNC: NEGATIVE MG/DL
PROT UR-MCNC: 19 MG/DL
PROT/CREAT UR: 0.16 MG/G{CREAT} (ref 0–0.1)
RBC # BLD AUTO: 4.43 MILLION/UL (ref 3.81–5.12)
RBC #/AREA URNS AUTO: ABNORMAL /HPF
RH BLD: POSITIVE
SAO2 % BLDCOV: 9.4 ML/DL
SODIUM SERPL-SCNC: 135 MMOL/L (ref 136–145)
SP GR UR STRIP.AUTO: 1.02 (ref 1–1.03)
SPECIMEN EXPIRATION DATE: NORMAL
UROBILINOGEN UR QL STRIP.AUTO: 0.2 E.U./DL
WBC # BLD AUTO: 10.88 THOUSAND/UL (ref 4.31–10.16)
WBC #/AREA URNS AUTO: ABNORMAL /HPF

## 2017-10-25 PROCEDURE — 85025 COMPLETE CBC W/AUTO DIFF WBC: CPT | Performed by: FAMILY MEDICINE

## 2017-10-25 PROCEDURE — 86901 BLOOD TYPING SEROLOGIC RH(D): CPT | Performed by: FAMILY MEDICINE

## 2017-10-25 PROCEDURE — 81001 URINALYSIS AUTO W/SCOPE: CPT | Performed by: FAMILY MEDICINE

## 2017-10-25 PROCEDURE — 86850 RBC ANTIBODY SCREEN: CPT | Performed by: FAMILY MEDICINE

## 2017-10-25 PROCEDURE — 80053 COMPREHEN METABOLIC PANEL: CPT | Performed by: FAMILY MEDICINE

## 2017-10-25 PROCEDURE — 84156 ASSAY OF PROTEIN URINE: CPT | Performed by: FAMILY MEDICINE

## 2017-10-25 PROCEDURE — 0KQM0ZZ REPAIR PERINEUM MUSCLE, OPEN APPROACH: ICD-10-PCS | Performed by: OBSTETRICS & GYNECOLOGY

## 2017-10-25 PROCEDURE — 86592 SYPHILIS TEST NON-TREP QUAL: CPT | Performed by: FAMILY MEDICINE

## 2017-10-25 PROCEDURE — 10907ZC DRAINAGE OF AMNIOTIC FLUID, THERAPEUTIC FROM PRODUCTS OF CONCEPTION, VIA NATURAL OR ARTIFICIAL OPENING: ICD-10-PCS | Performed by: OBSTETRICS & GYNECOLOGY

## 2017-10-25 PROCEDURE — 3E033VJ INTRODUCTION OF OTHER HORMONE INTO PERIPHERAL VEIN, PERCUTANEOUS APPROACH: ICD-10-PCS | Performed by: OBSTETRICS & GYNECOLOGY

## 2017-10-25 PROCEDURE — 82570 ASSAY OF URINE CREATININE: CPT | Performed by: FAMILY MEDICINE

## 2017-10-25 PROCEDURE — 82805 BLOOD GASES W/O2 SATURATION: CPT | Performed by: OBSTETRICS & GYNECOLOGY

## 2017-10-25 PROCEDURE — 86900 BLOOD TYPING SEROLOGIC ABO: CPT | Performed by: FAMILY MEDICINE

## 2017-10-25 PROCEDURE — 99213 OFFICE O/P EST LOW 20 MIN: CPT

## 2017-10-25 RX ORDER — SODIUM CHLORIDE, SODIUM LACTATE, POTASSIUM CHLORIDE, CALCIUM CHLORIDE 600; 310; 30; 20 MG/100ML; MG/100ML; MG/100ML; MG/100ML
125 INJECTION, SOLUTION INTRAVENOUS CONTINUOUS
Status: DISCONTINUED | OUTPATIENT
Start: 2017-10-25 | End: 2017-10-27 | Stop reason: HOSPADM

## 2017-10-25 RX ORDER — DIAPER,BRIEF,INFANT-TODD,DISP
1 EACH MISCELLANEOUS AS NEEDED
Status: DISCONTINUED | OUTPATIENT
Start: 2017-10-25 | End: 2017-10-27 | Stop reason: HOSPADM

## 2017-10-25 RX ORDER — ONDANSETRON 2 MG/ML
4 INJECTION INTRAMUSCULAR; INTRAVENOUS EVERY 8 HOURS PRN
Status: DISCONTINUED | OUTPATIENT
Start: 2017-10-25 | End: 2017-10-25

## 2017-10-25 RX ORDER — ONDANSETRON 2 MG/ML
4 INJECTION INTRAMUSCULAR; INTRAVENOUS EVERY 8 HOURS PRN
Status: DISCONTINUED | OUTPATIENT
Start: 2017-10-25 | End: 2017-10-27 | Stop reason: HOSPADM

## 2017-10-25 RX ORDER — DIPHENHYDRAMINE HCL 25 MG
25 TABLET ORAL EVERY 6 HOURS PRN
Status: DISCONTINUED | OUTPATIENT
Start: 2017-10-25 | End: 2017-10-27 | Stop reason: HOSPADM

## 2017-10-25 RX ORDER — SIMETHICONE 80 MG
80 TABLET,CHEWABLE ORAL 4 TIMES DAILY PRN
Status: DISCONTINUED | OUTPATIENT
Start: 2017-10-25 | End: 2017-10-27 | Stop reason: HOSPADM

## 2017-10-25 RX ORDER — OXYCODONE HYDROCHLORIDE AND ACETAMINOPHEN 5; 325 MG/1; MG/1
2 TABLET ORAL EVERY 4 HOURS PRN
Status: DISCONTINUED | OUTPATIENT
Start: 2017-10-25 | End: 2017-10-27 | Stop reason: HOSPADM

## 2017-10-25 RX ORDER — ACETAMINOPHEN 325 MG/1
650 TABLET ORAL EVERY 6 HOURS PRN
Status: DISCONTINUED | OUTPATIENT
Start: 2017-10-25 | End: 2017-10-25

## 2017-10-25 RX ORDER — PNV NO.95/FERROUS FUM/FOLIC AC 28MG-0.8MG
1 TABLET ORAL
COMMUNITY

## 2017-10-25 RX ORDER — LIDOCAINE HYDROCHLORIDE AND EPINEPHRINE 15; 5 MG/ML; UG/ML
INJECTION, SOLUTION EPIDURAL AS NEEDED
Status: DISCONTINUED | OUTPATIENT
Start: 2017-10-25 | End: 2017-10-25 | Stop reason: SURG

## 2017-10-25 RX ORDER — OXYTOCIN/RINGER'S LACTATE 30/500 ML
250 PLASTIC BAG, INJECTION (ML) INTRAVENOUS CONTINUOUS
Status: ACTIVE | OUTPATIENT
Start: 2017-10-25 | End: 2017-10-25

## 2017-10-25 RX ORDER — OXYTOCIN/RINGER'S LACTATE 30/500 ML
1-30 PLASTIC BAG, INJECTION (ML) INTRAVENOUS
Status: DISCONTINUED | OUTPATIENT
Start: 2017-10-25 | End: 2017-10-27 | Stop reason: HOSPADM

## 2017-10-25 RX ORDER — DOCUSATE SODIUM 100 MG/1
100 CAPSULE, LIQUID FILLED ORAL 2 TIMES DAILY
Status: DISCONTINUED | OUTPATIENT
Start: 2017-10-25 | End: 2017-10-27 | Stop reason: HOSPADM

## 2017-10-25 RX ORDER — ACETAMINOPHEN 325 MG/1
650 TABLET ORAL EVERY 6 HOURS PRN
Status: DISCONTINUED | OUTPATIENT
Start: 2017-10-25 | End: 2017-10-27 | Stop reason: HOSPADM

## 2017-10-25 RX ORDER — ROPIVACAINE HYDROCHLORIDE 2 MG/ML
INJECTION, SOLUTION EPIDURAL; INFILTRATION; PERINEURAL AS NEEDED
Status: DISCONTINUED | OUTPATIENT
Start: 2017-10-25 | End: 2017-10-25 | Stop reason: SURG

## 2017-10-25 RX ORDER — OXYCODONE HYDROCHLORIDE AND ACETAMINOPHEN 5; 325 MG/1; MG/1
1 TABLET ORAL EVERY 4 HOURS PRN
Status: DISCONTINUED | OUTPATIENT
Start: 2017-10-25 | End: 2017-10-27 | Stop reason: HOSPADM

## 2017-10-25 RX ORDER — IBUPROFEN 600 MG/1
600 TABLET ORAL EVERY 6 HOURS PRN
Status: DISCONTINUED | OUTPATIENT
Start: 2017-10-25 | End: 2017-10-27 | Stop reason: HOSPADM

## 2017-10-25 RX ADMIN — WITCH HAZEL 1 PAD: 500 SOLUTION RECTAL; TOPICAL at 23:36

## 2017-10-25 RX ADMIN — SODIUM CHLORIDE, SODIUM LACTATE, POTASSIUM CHLORIDE, AND CALCIUM CHLORIDE 125 ML/HR: .6; .31; .03; .02 INJECTION, SOLUTION INTRAVENOUS at 15:16

## 2017-10-25 RX ADMIN — ROPIVACAINE HYDROCHLORIDE 5 ML: 2 INJECTION, SOLUTION EPIDURAL; INFILTRATION at 15:10

## 2017-10-25 RX ADMIN — IBUPROFEN 600 MG: 600 TABLET, FILM COATED ORAL at 23:47

## 2017-10-25 RX ADMIN — ROPIVACAINE HYDROCHLORIDE: 2 INJECTION, SOLUTION EPIDURAL; INFILTRATION at 15:08

## 2017-10-25 RX ADMIN — SODIUM CHLORIDE, SODIUM LACTATE, POTASSIUM CHLORIDE, AND CALCIUM CHLORIDE 125 ML/HR: .6; .31; .03; .02 INJECTION, SOLUTION INTRAVENOUS at 11:04

## 2017-10-25 RX ADMIN — SODIUM CHLORIDE, SODIUM LACTATE, POTASSIUM CHLORIDE, AND CALCIUM CHLORIDE 999 ML/HR: .6; .31; .03; .02 INJECTION, SOLUTION INTRAVENOUS at 13:30

## 2017-10-25 RX ADMIN — Medication 2 MILLI-UNITS/MIN: at 11:44

## 2017-10-25 RX ADMIN — ROPIVACAINE HYDROCHLORIDE 5 ML: 2 INJECTION, SOLUTION EPIDURAL; INFILTRATION at 15:05

## 2017-10-25 RX ADMIN — HYDROCORTISONE 1 APPLICATION: 1 CREAM TOPICAL at 23:36

## 2017-10-25 RX ADMIN — BENZOCAINE AND MENTHOL: 20; .5 SPRAY TOPICAL at 23:36

## 2017-10-25 RX ADMIN — ONDANSETRON 4 MG: 2 INJECTION INTRAMUSCULAR; INTRAVENOUS at 16:01

## 2017-10-25 RX ADMIN — LIDOCAINE HYDROCHLORIDE AND EPINEPHRINE 3 ML: 15; 5 INJECTION, SOLUTION EPIDURAL at 15:02

## 2017-10-25 NOTE — OB LABOR/OXYTOCIN SAFETY PROGRESS
Oxytocin Safety Progress Check Note - Chika [facility] 32 y o  female MRN: [id number]    Unit/Bed#: L&D 326-01 Encounter: [id number]    Obstetric History       T0      L0     SAB0   TAB0   Ectopic0   Multiple0   Live Births0      Gestational Age: 39w1d  Dose (alexus-units/min) Oxytocin: 4 alexus-units/min  Contraction Frequency (minutes): 3-4 min   Contraction Quality: Moderate  Tachysystole: No   Dilation: 5        Effacement (%): 90  Station: -1  Baseline Rate: 130 bpm  Fetal Heart Rate: 130 BPM  FHR Category: Category I     Oxytocin Safety Progress Check: Safety check completed    Notes/comments:      comfortable with epidural / had made cervical change will continue current management        David Sargent MD 10/25/2017 3:22 PM

## 2017-10-25 NOTE — OB LABOR/OXYTOCIN SAFETY PROGRESS
Oxytocin Safety Progress Check Note - Kofi Winter 32 y o  female MRN: [id number]    Unit/Bed#: L&D 326-01 Encounter: [id number]    Obstetric History       T0      L0     SAB0   TAB0   Ectopic0   Multiple0   Live Births0      Gestational Age: 39w1d  Dose (alexus-units/min) Oxytocin: 6 alexus-units/min  Contraction Frequency (minutes): 2  Contraction Quality: Strong  Tachysystole: No   Dilation: 10  Dilation Complete Date: 10/25/17  Dilation Complete Time:   Effacement (%): 100  Station: 2  Baseline Rate: 140 bpm  Fetal Heart Rate: 130 BPM  FHR Category: Category II     Oxytocin Safety Progress Check: Safety check completed    Notes/comments:   New onset variable decelerations appreciated  Spontaneous recovery noted with intervening moderate variability  Patient completely dilated with an urge to push  Dr Remy Albrecht aware, will begin pushing momentarily              Chanel Abdullahi MD 10/25/2017 7:28 PM

## 2017-10-25 NOTE — OB LABOR/OXYTOCIN SAFETY PROGRESS
Oxytocin Safety Progress Check Note - Byron Diaz 32 y o  female MRN: [id number]    Unit/Bed#: L&D 326-01 Encounter: [id number]    Obstetric History       T0      L0     SAB0   TAB0   Ectopic0   Multiple0   Live Births0      Gestational Age: 39w1d  Dose (alexus-units/min) Oxytocin: 6 alexus-units/min  Contraction Frequency (minutes): 2  Contraction Quality: Moderate  Tachysystole: No   Dilation: Lip/rim (Comment)        Effacement (%): 100  Station: 0  Baseline Rate: 120 bpm  Fetal Heart Rate: 130 BPM  FHR Category: Category I     Oxytocin Safety Progress Check: Safety check completed    Notes/comments:     FHT Cat 1: early decelerations appreciated  Cervical change appreciated  Anticipate spontaneous vaginal delivery    Discussed with Dr Ramila Jurado DO 10/25/2017 5:15 PM

## 2017-10-25 NOTE — OB LABOR/OXYTOCIN SAFETY PROGRESS
Labor Progress Note - Zohra [facility] 32 y o  female MRN: [id number]    Unit/Bed#: L&D 326-01 Encounter: [id number]    Obstetric History       T0      L0     SAB0   TAB0   Ectopic0   Multiple0   Live Births0      Gestational Age: 39w1d  Dose (alexus-units/min) Oxytocin: 4 alexus-units/min  Contraction Frequency (minutes): 3-5  Contraction Quality: Moderate  Tachysystole: No   Dilation: 3        Effacement (%): 90  Station: -1  Baseline Rate: 130 bpm  Fetal Heart Rate: 150 BPM  FHR Category: Category I     Oxytocin Safety Progress Check: Safety check completed    Notes/comments:   Patient is comfortable, undecided about epidural as of yet  AROM at 1253 by Dr Dione Duke are category I   Contractions every 3-5 mins   Pit is at 4, continue 2x2 [id number]    Discussed with Dr Matthew CHASE 10/25/2017 1:00 PM

## 2017-10-25 NOTE — PLAN OF CARE
BIRTH - VAGINAL/ SECTION     Fetal and maternal status remain reassuring during the birth process [de-identified]     Emotionally satisfying birthing experience for mother/fetus [address] Discharge to home or other facility with appropriate resources Progressing        INFECTION - ADULT     Absence or prevention of progression during hospitalization Progressing     Absence of fever/infection during neutropenic period Progressing        Knowledge Deficit     Patient/family/caregiver demonstrates understanding of disease process, treatment plan, medications, and discharge instructions Progressing     Verbalizes understanding of labor plan Progressing        Labor & Delivery     Manages discomfort Progressing     Patient vital signs are stable Progressing        PAIN - ADULT     Verbalizes/displays adequate comfort level or baseline comfort level Progressing        SAFETY ADULT     Patient will remain free of falls Progressing     Maintain or return to baseline ADL function Progressing     Maintain or return mobility status to optimal level Progressing

## 2017-10-25 NOTE — ANESTHESIA PROCEDURE NOTES
Epidural Block    Patient location during procedure: OB  Start time: 10/25/2017 2:40 PM  Staffing  Anesthesiologist: Maria Teresa Vergara  Performed: anesthesiologist   Epidural  Patient position: sitting  Prep: Betadine  Patient monitoring: heart rate, continuous pulse ox and frequent blood pressure checks  Approach: midline  Location: lumbar (1-5)  Injection technique: TONYA saline  Needle  Needle type: Tuohy   Needle gauge: 18 G  Catheter type: side hole  Catheter size: 20 G  Catheter at skin depth: 11 cm  Test dose: negative and lidocaine 1 5% with epinephrine 1-to-200,000  Assessment  Events: blood aspiratednegative aspiration for CSF, negative aspiration for heme and no paresthesia on injection  patient tolerated the procedure well with no immediate complications  Additional Notes  3 attempts first 2 heme +  Last attempt no csf no heme

## 2017-10-25 NOTE — ANESTHESIA PREPROCEDURE EVALUATION
Review of Systems/Medical History  Patient summary reviewed  Chart reviewed  No history of anesthetic complications     Cardiovascular  Negative cardio ROS Hypertension ,    Pulmonary  Negative pulmonary ROS ,        GI/Hepatic  Negative GI/hepatic ROS          Negative  ROS        Endo/Other  Negative endo/other ROS      GYN  Currently pregnant ,          Hematology  Negative hematology ROS      Musculoskeletal  Negative musculoskeletal ROS        Neurology  Negative neurology ROS      Psychology   Anxiety,            Physical Exam    Airway    Mallampati score: II  TM Distance: >3 FB  Neck ROM: full     Dental   No notable dental hx     Cardiovascular  Comment: Negative ROS, Rhythm: regular, Rate: normal, Cardiovascular exam normal    Pulmonary  Pulmonary exam normal Breath sounds clear to auscultation,     Other Findings        Anesthesia Plan  ASA Score- 2       Anesthesia Type- epidural with ASA Monitors     Additional Monitors:   Airway Plan:           Induction-       Informed Consent-

## 2017-10-25 NOTE — H&P
OB H&P  Nano Lips  1990  L&D 329/L&D 329-01  Patient of Dr Susan Curry    32 y o  yo  at 44 1/7 weeks by ultrasound at 8 weeks  Chief complaint:  I think my water broke  I have a headache with black spots in my vision but I have a history of migraines      HPI:  Contractions:  Yes since around midnight  Fetal movement:  yes  Vaginal bleeding:   Yes brown spotting with some bright red spotting  Leaking of fluid:  yes      Pregnancy Complications:  Recent eval for HTN noted in office 1 day ago: labs normal in triage  Low-lying placenta, resolved  Headaches    Past Medical History:  none    Past Surgical History:  none    Social Hx:  Denies x3    Meds:  Prenatal vitamins     Allergies:  No Known Allergies    Obstetric History:  D&E at 6 weeks by Dr Yesenia Florez:           Component Date Value    WBC 10/24/2017 9 88     RBC 10/24/2017 4 76     Hemoglobin 10/24/2017 13 6     Hematocrit 10/24/2017 41 2     MCV 10/24/2017 87     MCH 10/24/2017 28 6     MCHC 10/24/2017 33 0     RDW 10/24/2017 13 9     MPV 10/24/2017 10 5     Platelets  243     nRBC 10/24/2017 0     Neutrophils Relative 10/24/2017 74     Lymphocytes Relative 10/24/2017 19     Monocytes Relative 10/24/2017 6     Eosinophils Relative 10/24/2017 1     Basophils Relative 10/24/2017 0     Neutrophils Absolute 10/24/2017 7 32     Lymphocytes Absolute 10/24/2017 1 87     Monocytes Absolute 10/24/2017 0 59     Eosinophils Absolute 10/24/2017 0 07     Basophils Absolute 10/24/2017 0 03     Sodium 10/24/2017 134*    Potassium 10/24/2017 3 9     Chloride 10/24/2017 102     CO2 10/24/2017 23     Anion Gap 10/24/2017 9     BUN 10/24/2017 11     Creatinine 10/24/2017 0 71     Glucose 10/24/2017 106     Calcium 10/24/2017 9 3     AST 10/24/2017 24     ALT 10/24/2017 23     Alkaline Phosphatase 10/24/2017 157*    Total Protein 10/24/2017 7 1     Albumin 10/24/2017 2 8*    Total Bilirubin 10/24/2017 0 23     eGFR 10/24/2017 115     Creatinine, Ur 10/24/2017 88 9     Protein Urine Random 10/24/2017 10     Prot/Creat Ratio, Ur 10/24/2017 0 11*    Uric Acid 10/24/2017 4 6     Clarity, UA 10/24/2017 Clear     Color, UA 10/24/2017 Yellow     Specific Gravity, UA 10/24/2017 1 010     pH, UA 10/24/2017 7 0     Glucose, UA 10/24/2017 Negative     Ketones, UA 10/24/2017 Negative     Blood, UA 10/24/2017 Small*    Protein, UA 10/24/2017 Negative     Nitrite, UA 10/24/2017 Negative     Bilirubin, UA 10/24/2017 Negative     Urobilinogen, UA 10/24/2017 0 2     Leukocytes, UA 10/24/2017 Negative     WBC, UA 10/24/2017 0-1*    RBC, UA 10/24/2017 None Seen     Bacteria, UA 10/24/2017 None Seen     AMORPH PHOSPATES 10/24/2017 Occasional     Epithelial Cells 10/24/2017 Occasional      Protein creatinine ratio 10/24/17  =  0 11      Group B strep: negative  Blood type: A pos  HIV: negative 3/31/17  Hep B surface antigen: NR  RPR: NR  Rubella: immune  1 hour Glucose: 128      Physical Exam:  Blood pressure today: 146/91, 155/103, 137/91, 128/86  Blood pressure yesterday: 140/92, 148/92 in office; 131/72 in triage    Weight:  182 lb  Height:  5 foot 3 inches  Heart: RRR no MRG  Lungs: CTAB  Abdomen: Gravid, nontender  KHUSHI: 5 65 cm  Estimated Fetal Weight: 8 lbs  Extremeties: no c/ce  Reflexes: 2+ bilaterally  Presentation: VTX  Cervix: 2/80/-1, mid position, soft  FHR: 140, moderate variability, accels present, no decels  CTXN: q4 minutes  Membranes: presumed intact  (negative fern, negative pool), palpable membrane on cervical exam     Assessment:    at 39 1/7 weeks     GBS negative  Gestational hypertension based on two days' blood pressure readings  Headache  Contractions now represent false labor versus very early contractions in the latent phase    Plan:   Induction of labor with oxytocin  Continue to monitor blood pressure (subsequent pressures later in triage within normal limits)  Tylenol for headache  Discussed with Sukhwinder

## 2017-10-26 LAB — RPR SER QL: NORMAL

## 2017-10-26 RX ADMIN — IBUPROFEN 600 MG: 600 TABLET, FILM COATED ORAL at 05:23

## 2017-10-26 RX ADMIN — BENZOCAINE AND MENTHOL: 20; .5 SPRAY TOPICAL at 22:12

## 2017-10-26 RX ADMIN — DOCUSATE SODIUM 100 MG: 100 CAPSULE, LIQUID FILLED ORAL at 18:39

## 2017-10-26 RX ADMIN — OXYCODONE HYDROCHLORIDE AND ACETAMINOPHEN 1 TABLET: 5; 325 TABLET ORAL at 10:06

## 2017-10-26 RX ADMIN — IBUPROFEN 600 MG: 600 TABLET, FILM COATED ORAL at 22:12

## 2017-10-26 RX ADMIN — WITCH HAZEL 1 PAD: 500 SOLUTION RECTAL; TOPICAL at 22:12

## 2017-10-26 RX ADMIN — OXYCODONE HYDROCHLORIDE AND ACETAMINOPHEN 1 TABLET: 5; 325 TABLET ORAL at 02:33

## 2017-10-26 RX ADMIN — IBUPROFEN 600 MG: 600 TABLET, FILM COATED ORAL at 15:16

## 2017-10-26 RX ADMIN — DOCUSATE SODIUM 100 MG: 100 CAPSULE, LIQUID FILLED ORAL at 10:05

## 2017-10-26 NOTE — ANESTHESIA POSTPROCEDURE EVALUATION
Post-Op Assessment Note      CV Status:  Stable    Mental Status:  Alert and awake    Hydration Status:  Euvolemic    PONV Controlled:  Controlled    Airway Patency:  Patent    Post Op Vitals Reviewed: Yes          Staff: Anesthesiologist     Post-op block assessment: catheter intact        BP      Temp      Pulse     Resp      SpO2

## 2017-10-26 NOTE — DISCHARGE SUMMARY
Discharge Summary - Kenneth Henriquez 32 y o  female MRN: [id number]    Unit/Bed#: L&D 326-01 Encounter: [id number]    Admission Date: 10/25/2017     Discharge Date: 10/27/2017      Admitting Diagnosis:   1  Pregnancy at 39w1d  2  gHTN    Discharge Diagnosis: same, delivered    Procedures: spontaneous vaginal delivery    Attending: Morro Ascencio MD    Hospital Course:     Kenneth Henriquez is a 32 y o  Phyllis Erendira at 39w1d wks who was initially admitted for induction of labor for gHTN  Lab evaluation did not reveal evidence of preeclampsia and the patient was asymptomatic  She was started on pitocin for induction  Membranes were artificially ruptured  She received an epidural for analgesia  She progressed to complete and began pushing  She delivered a viable male  on 10/25/17 at 1950  Weight 7lbs 11 3oz via spontaneous vaginal delivery  Apgars were 8 (1 min) and 9 (5 min)   was transferred to  nursery  Patient tolerated the procedure well and was transferred to recovery in stable condition  Her post-partum course was uncomplicated  Her post-partum pain was well controlled with oral analgesics  On day of discharge, she was ambulating and able to reasonably perform all ADLs  She was voiding and had appropriate bowel function  Pain was well controlled  She was discharged home on post-partum day #2 without complications  Patient was instructed to follow up with her OB as an outpatient and was given appropriate warnings to call provider if she develops signs of infection or uncontrolled pain  Complications: none apparent    Condition at discharge: good     Discharge instructions/Information to patient and family:   See after visit summary for information provided to patient and family  Provisions for Follow-Up Care:  See after visit summary for information related to follow-up care and any pertinent home health orders        Disposition: Home    Planned Readmission: No

## 2017-10-26 NOTE — DISCHARGE INSTRUCTIONS
Vaginal Delivery   WHAT YOU SHOULD KNOW:   A vaginal delivery is the birth of your baby through your vagina (birth canal)  AFTER YOU LEAVE:   Medicines:  · NSAIDs  help decrease swelling and pain or fever  This medicine is available with or without a doctor's order  NSAIDs can cause stomach bleeding or kidney problems in certain people  If you take blood thinner medicine, always ask your healthcare provider if NSAIDs are safe for you  Always read the medicine label and follow directions  · Take your medicine as directed  Call your healthcare provider if you think your medicine is not helping or if you have side effects  Tell him if you are allergic to any medicine  Keep a list of the medicines, vitamins, and herbs you take  Include the amounts, and when and why you take them  Bring the list or the pill bottles to follow-up visits  Carry your medicine list with you in case of an emergency  Follow up with your primary healthcare provider:  Most women need to return 6 weeks after a vaginal delivery  Ask about how to care for your wounds or stitches  Write down your questions so you remember to ask them during your visits  Activity:  Rest as much as possible  Try to keep all activities short  You may be able to do some exercise soon after you have your baby  Talk with your primary healthcare provider before you start exercising  If you work outside the home, ask when you can return to your job  Kegel exercises:  Kegel exercises may help your vaginal and rectal muscles heal faster  You can do Kegel exercises by tightening and relaxing the muscles around your vagina  Kegel exercises help make the muscles stronger  Breast care:  When your milk comes in, your breasts may feel full and hard  Ask how to care for your breasts, even if you are not breastfeeding  Constipation:  Do not try to push the bowel movement out if it is too hard   High-fiber foods, extra liquids, and regular exercise can help you prevent constipation  Examples of high-fiber foods are fruit and bran  Prune juice and water are good liquids to drink  Regular exercise helps your digestive system work  You may also be told to take over-the-counter fiber and stool softener medicines  Take these items as directed  Hemorrhoids:  Pregnancy can cause severe hemorrhoids  You may have rectal pain because of the hemorrhoids  Ask how to prevent or treat hemorrhoids  Perineum care: Your perineum is the area between your vagina and anus  Keep the area clean and dry to help it heal and to prevent infection  Wash the area gently with soap and water when you bathe or shower  Rinse your perineum with warm water when you use the toilet  Your primary healthcare provider may suggest you use a warm sitz bath to help decrease pain  A sitz bath is a bathtub or basin filled to hip level  Stay in the sitz bath for 20 to 30 minutes, or as directed  Vaginal discharge: You will have vaginal discharge, called lochia, after your delivery  The lochia is bright red the first day or two after the birth  By the fourth day, the amount decreases, and it turns red-brown  Use a sanitary pad rather than a tampon to prevent a vaginal infection  It is normal to have lochia up to 8 weeks after your baby is born  Monthly periods: Your period may start again within 7 to 12 weeks after your baby is born  If you are breastfeeding, it may take longer for your period to start again  You can still get pregnant again even though you do not have your monthly period  Talk with your primary healthcare provider about a birth control method that will be good for you if you do not want to get pregnant  Mood changes: Many new mothers have some kind of mood changes after delivery  Some of these changes occur because of lack of sleep, hormone changes, and caring for a new baby  Some mood changes can be more serious, such as postpartum depression   Talk with your primary healthcare provider if you feel unable to care for yourself or your baby  Sexual activity:  You may need to avoid sex for 6 to 7 weeks after you have your baby  You may notice you have a decreased desire for sex, or sex may be painful  You may need to use a vaginal lubricant (gel) to help make sex more comfortable  Contact your primary healthcare provider if:   · You have heavy vaginal bleeding that fills 1 or more sanitary pads in 1 hour  · You have a fever  · Your pain does not go away, or gets worse  · The skin between your vagina and rectum is swollen, warm, or red  · You have swollen, hard, or painful breasts  · You feel very sad or depressed  · You feel more tired than usual      · You have questions or concerns about your condition or care  Seek care immediately or call 911 if:   · You have pus or yellow drainage coming from your vagina or wound  · You are urinating very little, or not at all  · Your arm or leg feels warm, tender, and painful  It may look swollen and red  · You feel lightheaded, have sudden and worsening chest pain, or trouble breathing  You may have more pain when you take deep breaths or cough, or you may cough up blood  © 2014 [address] is for End User's use only and may not be sold, redistributed or otherwise used for commercial purposes  All illustrations and images included in CareNotes® are the copyrighted property of [employer]  or [employer]  The above information is an  only  It is not intended as medical advice for individual conditions or treatments  Talk to your doctor, nurse or pharmacist before following any medical regimen to see if it is safe and effective for you

## 2017-10-26 NOTE — PLAN OF CARE
Problem: POSTPARTUM  Goal: Experiences normal postpartum course  INTERVENTIONS:  - Monitor maternal vital signs  - Assess uterine involution and lochia   Outcome: Progressing    Goal: Appropriate maternal -  bonding  INTERVENTIONS:  - Identify family support  - Assess for appropriate maternal/infant bonding   -Encourage maternal/infant bonding opportunities  - Referral to  or  as needed   Outcome: Progressing    Goal: Establishment of infant feeding pattern  INTERVENTIONS:  - Assess breast/bottle feeding  - Refer to lactation as needed   Outcome: Progressing    Goal: Incision(s), wounds(s) or drain site(s) healing without S/S of infection  INTERVENTIONS  - Assess and document risk factors for skin impairment   - Assess and document dressing, incision, wound bed, drain sites and surrounding tissue  - Initiate Nutrition services consult and/or wound management as needed   Outcome: Progressing

## 2017-10-26 NOTE — L&D DELIVERY NOTE
Delivery Summary - OB/GYN   Davidson Read 32 y o  female MRN: [id number]  Unit/Bed#: L&D 326-01 Encounter: [id number]    Pre-delivery Diagnosis:   1  39w1d pregnancy  2  gHTN    Post-delivery Diagnosis: same, delivered    Attending: Ginger Habermann    Assistant(s): Corina    Procedure:     Anesthesia: epidural    Estimated Blood Loss:  350 mL    Specimens:   1  Arterial and venous cord gases  2  Cord blood  3  Segment of umbilical cord  4  Placenta to storage     Complications:  None apparent    Findings:  1  Viable male  delivered on 10/25/17 at 1950 weighing 7lbs 11 3oz;  Apgar scores of 8 at one minute and 9 at five minutes  2  Spontaneous delivery of placenta with centrally inserted 3-vessel cord  3  2nd degree perineal laceration, repaired with 3-0 Vicryl rapide       Disposition: Patient tolerated the procedure well and was recovering in labor and delivery room with family and  before being transferred to the post-partum floor  Procedure Details     Description of procedure    After pushing for 8 minutes, on 10/25/17 at [address] [place] [address] patient delivered a viable male , weighing 7lbs 11 3oz, Apgars of 8 (1 min) and 9 (5 min)  The fetal vertex delivered spontaneously  There was no nuchal cord  The anterior shoulder delivered atraumatically with maternal expulsive forces and the assistance of gentle downward traction  The posterior shoulder delivered with maternal expulsive forces and the assistance of gentle upward traction  The remainder of the fetus delivered spontaneously  Upon delivery, the infant was placed on the mothers abdomen and the cord clamping was delayed approximately 30 seconds, after which time it was clamped and cut  The infant was noted to cry spontaneously and was moving all extremities appropriately  There was no evidence for injury  Awaiting nurse resuscitators evaluated the  at bedside   Arterial and venous cord blood gases and cord blood was collected for analysis  These were promptly sent to the lab  In the immediate post-partum, 30 units of IV pitocin was administered and the uterus was noted to contract down well with massage and pitocin  The placenta delivered spontaneously at [address] [place] [address] and was noted to have a centrally inserted 3 vessel cord  Placenta was inspected and small portion noted to be missing  A bimanual exam was performed and two, 2cm pieces of placenta were extracted  No additional products of conception were appreciated on repeat exam       The vagina, cervix, and perineum were inspected and there was noted to be a 2nd degree perineal laceration requiring repair  Laceration Repair  Patient was comfortable with epidural at that time  Laceration was repaired with 3-0 Vicryl rapide in standard two layer fashion to reapproximate the laceration  Good hemostasis was confirmed at the conclusion of this procedure  At the conclusion of the delivery, all needle, sponge, and instrument counts were noted to be correct  Patient tolerated the procedure well and was allowed to recover in labor and delivery room with family and  before being transferred to the post-partum floor  Dr Fanta Martinez was present and participated in all key portions of the case

## 2017-10-26 NOTE — PROGRESS NOTES
Progress Note - OB/GYN   Adore Willoughby 32 y o  female MRN: [id number]  Unit/Bed#: L&D 307-01 Encounter: [id number]    Assessment:  PP #1 s/p Spontaneous Vaginal Delivery, stable    Plan:  1  Gestational Hypertension: isolated 140/87 overnight, otherwise 110-120s/50-60s  2  Continue routine postpartum care  3  Encourage ambulation  4  Encourage breastfeeding  5  Pain control as needed    Disposition: stable, anticipate discharge tomorrow    Subjective/Objective   Chief Complaint:     PP#1 s/p Spontaneous Vaginal Delivery    Subjective:     Pain: no  Tolerating PO: yes  Voiding: yes  Flatus: yes  BM: no  Ambulating: yes  Breastfeeding: Breastfeeding  Chest pain: no  Shortness of breath: no  Leg pain: no  Lochia: Moderate    Objective:     Vitals:  Vitals:    10/25/17 2130 10/25/17 2200 10/25/17 2309 10/26/17 0309   BP: 125/60 122/64 140/68 114/56   Pulse: 93 87 (!) 112 74   Resp: 16 16 16 18   Temp:  98 9 °F (37 2 °C) 98 6 °F (37 °C) 98 4 °F (36 9 °C)   TempSrc:   Oral Oral       Physical Exam:     AAOx3, NAD  CV, RRR  CTA b/l, no WRR  Soft, non-tender, non-distended, no rebound or guarding  Uterine fundus firm and non-tender, at the umbilicus   Negative Torri's bilaterally    Lab, Imaging and other studies: I have personally reviewed pertinent reports        Lab Results   Component Value Date    WBC 10 88 (H) 10/25/2017    HGB 12 9 10/25/2017    HCT 38 3 10/25/2017    MCV 87 10/25/2017     10/25/2017               [address], DO  10/26/17

## 2017-10-26 NOTE — LACTATION NOTE
This note was copied from a baby's chart  CONSULT - LACTATION  Baby Boy Teto Bhatia 1 days male MRN: [id number]    [address] Room / Bed: L&D 307(N)/L&D 307(N) Encounter: [id number]    Maternal Information     MOTHER:  Dariela Moody  Maternal Age: 32 y o    OB History: #: 1, Date: 2016, Sex: None, Weight: None, GA: 11w0d, Delivery: None, Apgar1: None, Apgar5: None, Living: None, Birth Comments: None    #: 2, Date: 10/25/17, Sex: Male, Weight: 3497 g (7 lb 11 4 oz), GA: 39w1d, Delivery: Vaginal, Spontaneous Delivery, Apgar1: 8, Apgar5: 9, Living: Living, Birth Comments: None   Previouse breast reduction surgery? No    Lactation history:   Has patient previously breast fed: No   How long had patient previously breast fed:     Previous breast feeding complications:       Past Surgical History:   Procedure Laterality Date    ANTERIOR CRUCIATE LIGAMENT REPAIR      DENTAL SURGERY      LA SURG RX MISSED ABORTN,1ST TRI N/A 10/12/2016    Procedure: DILATATION AND EVACUATION (D&E);   Surgeon: Abner Mix DO;  Location: [facility];  Service: Gynecology    TONSILLECTOMY         Birth information:  YOB: 2019   Time of birth: 7:50 PM   Sex: male   Delivery type: Vaginal, Spontaneous Delivery   Birth Weight: 3497 g (7 lb 11 4 oz)   Percent of Weight Change: 0%     Gestational Age: 36w3d   [unfilled]    Assessment     Breast and nipple assessment: normal assessment    [place] Assessment: normal assessment    Feeding assessment: feeding well  LATCH:  Latch: Grasps breast, tongue down, lips flanged, rhythmic sucking   Audible Swallowing: Spontaneous and intermittent (24 hours old)   Type of Nipple: Everted (After stimulation)   Comfort (Breast/Nipple): Soft/non-tender   Hold (Positioning): Full assist, teach one side, mother does other, staff holds   [facility] Score: 9          Feeding recommendations:  breast feed on demand       Breastfeeding booklet given and reviewed with mom  Assisted with positioning and latching  Demo hand expression  Baby latched on well in football hold   Encouraged patient to call for assistance as needed,phone # given    Dorothy Benson RN 10/26/2017 2:51 PM

## 2017-10-27 VITALS
TEMPERATURE: 98.2 F | RESPIRATION RATE: 18 BRPM | SYSTOLIC BLOOD PRESSURE: 128 MMHG | OXYGEN SATURATION: 97 % | HEART RATE: 72 BPM | DIASTOLIC BLOOD PRESSURE: 79 MMHG

## 2017-10-27 RX ORDER — IBUPROFEN 600 MG/1
600 TABLET ORAL EVERY 6 HOURS PRN
Qty: 30 TABLET | Refills: 0
Start: 2017-10-27 | End: 2018-01-30

## 2017-10-27 RX ORDER — ACETAMINOPHEN 325 MG/1
TABLET ORAL
Qty: 30 TABLET | Refills: 0
Start: 2017-10-27 | End: 2018-01-30

## 2017-10-27 RX ADMIN — DOCUSATE SODIUM 100 MG: 100 CAPSULE, LIQUID FILLED ORAL at 08:00

## 2017-10-27 RX ADMIN — IBUPROFEN 600 MG: 600 TABLET, FILM COATED ORAL at 05:53

## 2017-10-27 RX ADMIN — IBUPROFEN 600 MG: 600 TABLET, FILM COATED ORAL at 12:26

## 2017-10-27 NOTE — PROGRESS NOTES
Progress Note - OB/GYN   [facility] 32 y o  female MRN: [id number]  Unit/Bed#: L&D 307-01 Encounter: [id number]    Assessment:  Post partum Day #2 s/p , stable, baby in room    Plan:  1) Gestational hypertension   - 110s/80s-90s overnight, asymptomatic  2) Continue routine post partum care  3) Encourage ambulation  4) Encourage breastfeeding  5) Anticipate discharge today     Subjective/Objective   Chief Complaint:     Post delivery  Patient is doing well and ready to be discharged  Pain is well controlled  Lochia WNL  Subjective:     Pain: yes, cramping, improved with meds  Tolerating PO: yes  Voiding: yes  Flatus: yes  BM: no  Ambulating: yes  Breastfeeding:  yes  Chest pain: no  Shortness of breath: no  Leg pain: no  Lochia: minimal    Objective:     Vitals: /81   Pulse 72   Temp 97 5 °F (36 4 °C) (Oral)   Resp 18   LMP 2017   SpO2 97%   Breastfeeding? Yes     No intake or output data in the 24 hours ending 10/27/17 0648    Lab Results   Component Value Date    WBC 10 88 (H) 10/25/2017    HGB 12 9 10/25/2017    HCT 38 3 10/25/2017    MCV 87 10/25/2017     10/25/2017       Physical Exam:     Gen: AAOx3, NAD  CV: RRR  Lungs: CTA b/l  Abd: Soft, non-tender, non-distended, no rebound or guarding  Uterine fundus firm and non-tender, at the umbilicus     Ext: Non tender    Silvia MD Mike  10/27/2017  6:48 AM

## 2017-10-27 NOTE — LACTATION NOTE
This note was copied from a baby's chart  Breastfeeding discharge booklet given and reviewed with patient  Patient verbalized breastfeeding is going well  Enc to call for assistance as needed,phone # given

## 2017-11-02 LAB — PLACENTA IN STORAGE: NORMAL

## 2017-12-04 ENCOUNTER — ALLSCRIPTS OFFICE VISIT (OUTPATIENT)
Dept: OTHER | Facility: OTHER | Age: 27
End: 2017-12-04

## 2017-12-06 ENCOUNTER — GENERIC CONVERSION - ENCOUNTER (OUTPATIENT)
Dept: OTHER | Facility: OTHER | Age: 27
End: 2017-12-06

## 2017-12-06 LAB
ADDITIONAL INFORMATION (HISTORICAL): NORMAL
ADEQUACY: (HISTORICAL): NORMAL
COMMENT (HISTORICAL): NORMAL
CYTOTECHNOLOGIST: (HISTORICAL): NORMAL
INTERPRETATION (HISTORICAL): NORMAL
LMP (HISTORICAL): NORMAL
PREV. BX: (HISTORICAL): NORMAL
PREV. PAP (HISTORICAL): NORMAL
SOURCE (HISTORICAL): NORMAL

## 2017-12-13 ENCOUNTER — GENERIC CONVERSION - ENCOUNTER (OUTPATIENT)
Dept: OTHER | Facility: OTHER | Age: 27
End: 2017-12-13

## 2017-12-13 ENCOUNTER — ALLSCRIPTS OFFICE VISIT (OUTPATIENT)
Dept: OTHER | Facility: OTHER | Age: 27
End: 2017-12-13

## 2018-01-07 ENCOUNTER — GENERIC CONVERSION - ENCOUNTER (OUTPATIENT)
Dept: OTHER | Facility: OTHER | Age: 28
End: 2018-01-07

## 2018-01-08 ENCOUNTER — ALLSCRIPTS OFFICE VISIT (OUTPATIENT)
Dept: OTHER | Facility: OTHER | Age: 28
End: 2018-01-08

## 2018-01-09 NOTE — RESULT NOTES
Verified Results  (1) HCG QUANT [id number] 06:00AM Indu Armas     Test Name Result Flag Reference   HCG, TOTAL, QN <2 mIU/mL     Reference Range  Nonpregnant or premenopausal    <5  Postmenopausal                 <10     Values from different assay methods may vary  The use of this assay to monitor or to diagnose   patients with cancer or any condition unrelated  to pregnancy has not been cleared or approved by  the FDA or the  of the assay  NO COLLECTION DATE RECEIVED  WE HAVE USED  THE DATE THE SPECIMEN WAS RECEIVED BY THIS  LABORATORY AS THE COLLECTION DATE  IF THIS  IS INCORRECT, PLEASE CONTACT CLIENT SERVICES    PHONE NUMBER: [phone]

## 2018-01-09 NOTE — PROGRESS NOTES
Chief Complaint  Pt presents today for a hcg and progesterone blood draw  Active Problems    1  Amenorrhea (626 0) (N91 2)   2  Blighted ovum (631 8) (O02 0)   3  History of spontaneous  (V13 29) (Z87 59)   4  Positive urine pregnancy test (V72 42) (Z32 01)    Current Meds   1  Prenatal Vitamin TABS; TAKE 1 TABLET DAILY; Therapy: (Recorded:2016) to Recorded   2  Progesterone Micronized 200 MG Oral Capsule; insert 1 capsule vaginally daily; Therapy: 05FCA2643 to (Evaluate:2018)  Requested for: 96KZU9117; Last   Rx:2017 Ordered    Allergies    1  No Known Drug Allergies    2  No Known Environmental Allergies   3  No Known Food Allergies    Plan  Positive urine pregnancy test    · (1) HCG QUANT; Status:Active - Retrospective By Protocol Authorization; Requested  MUO:[id number];    · (1) PROGESTERONE; Status:Active - Retrospective By Protocol Authorization; Requested [id number]; Future Appointments    Date/Time Provider Specialty Site   2017 04:00 PM MANNY Roberts   Obstetrics/Gynecology [facility]   2017 08:30 AM AKH[facility]ier[facility]     Signatures   Electronically signed by : MANNY Frazier ; 2017  1:07PM EST                       (Author)

## 2018-01-10 ENCOUNTER — GENERIC CONVERSION - ENCOUNTER (OUTPATIENT)
Dept: OTHER | Facility: OTHER | Age: 28
End: 2018-01-10

## 2018-01-10 NOTE — MISCELLANEOUS
Message  The pt called in this morning stating that the contractions have lessened but the swelling in her ankles is still there and she does not feel quite right  She states that she is having no issues with headaches or vision issues  She did note some SOB but that was on  with contractions  She also noted that she talked to the on call this weekend as well and there is a prior note in as well  I spoke with Dr Dang Benito and the patient was to be added on tomorrow or Thursday for an appt and she is set with Dr Jailyn Amato tomorrow morning  Until then the patient is to monitor herself and she is to call if anything changes with her symptoms prior to Wednesday  Active Problems    1  Amenorrhea (626 0) (N91 2)   2  Back pain (724 5) (M54 9)   3  Blighted ovum (631 8) (O02 0)   4  Encounter for pregnancy related examination in first trimester (V22 1) (Z34 91)   5  Encounter for pregnancy related examination in second trimester (V22 1) (Z34 92)   6  Encounter for supervision of normal first pregnancy in third trimester (V22 0) (Z34 03)   7  Encounter to determine fetal viability of pregnancy (V23 87) (O36 80X0)   8  Headache (784 0) (R51)   9  History of spontaneous  (V13 29) (Z87 59)   10  Lactation disorder (676 90) (O92 70)   11  Nausea and vomiting during pregnancy (643 90) (O21 9)   12  Positive urine pregnancy test (V72 42) (Z32 01)   13  Second trimester pregnancy (V22 2) (Z34 92)    Current Meds   1  Adult Low Dose Aspirin 81 MG TABS; TAKE 1 TABLET DAILY; Therapy: (Recorded:2017) to Recorded   2  Breast Pump Miscellaneous; USE AS DIRECTED; Therapy: 36Uzo1103 to (Last Rx:57Med3459) Ordered   3  Butalbital-APAP-Caffeine -40 MG Oral Capsule; Take 1 tabelt every 6 hrs  prn   headache; Therapy: 76DDT1492 to (Evaluate:2017)  Requested for: 18RHD8763; Last   Rx:[id number] Ordered   4  Cyclobenzaprine HCl - 5 MG Oral Tablet; TAKE 1 TABLET 3 TIMES DAILY AS NEEDED;    Therapy: 88CTD4915 to (Evaluate:23Dob3617)  Requested for: 01Aug2017; Last   Rx:[id number] Ordered   5  Prenatal Vitamin TABS; TAKE 1 TABLET DAILY; Therapy: (Recorded:24Apr2017) to Recorded    Allergies    1  No Known Drug Allergies    2  No Known Environmental Allergies   3   No Known Food Allergies    Signatures   Electronically signed by : MANNY Chavez ; Sep 26 2017  1:23PM EST                       (Author)

## 2018-01-10 NOTE — PROGRESS NOTES
Assessment   1  Anxiety (300 00) (F41 9)   2  Post partum depression (752 38,060) (F53)    Plan   Anxiety    · ALPRAZolam 0 25 MG Oral Tablet; TAKE 1 TABLET DAILY AS NEEDED   Rx By: Judge Bob; For: Anxiety; CUTRIS = N; Request Administration    Discussion/Summary   Discussion Summary:    After long discussion with patient she agrees to seeing a therapist I told her that we would put in the consult for [facility] in they would call her very soon to be seen for an intake appointment  She is expressing interest in starting back on Xanax or Ativan  She is aware that I cannot guarantee that there is nothing past with a better breast milk and that it is 100 percent safe however I did discuss with her the risk versus benefit that she has to decide upon  At this point patient states she would prefer just to start with therapy and see a psychiatrist through that office who can adjust her medication as needed  In the meantime she has decreased to the Zoloft 50 milligrams and does have â Xanax at home that she tried wants that helped her ' her the EPDS scale was 23 states she does not feel severely depressed she has no SI or HI thoughts  Her main complaint at this time is getting her anxiety under control  will call if she does not hear from the therapist soon her if she has any questions or concerns before then  Counseling Documentation With Imm: The patient was counseled regarding diagnostic results,-- instructions for management,-- risk factor reductions,-- prognosis,-- patient and family education,-- risks and benefits of treatment options,-- importance of compliance with treatment  total time of encounter was 30 minutes-- and-- 30 minutes was spent counseling  Goals and Barriers: The patient has the current Goals: Anxiety postpartum depression follow-up  The patent has the current Barriers: Postpartum baby in patient now  Patient's Capacity to Self-Care: Patient is able to Self-Care      Self Referrals:    Self Referrals: Yes      Chief Complaint   Chief Complaint Free Text Note Form: Discuss medications      History of Present Illness   HPI: Patient here for medication check after starting Zoloft for postpartum depression  initially was on 50 milligrams of Zoloft in did not feel that was helping her now off called in and was asking the doctor to up her dose  Dose was increased to 100 milligrams and recently patient feels foggy on this and does not feel like it is helping her anxiety at all  She called the on-call doctor discuss her symptoms with Dr boaz badillo and was told to make an appointment here for follow-up  she feels anxious at times and thinks that this is a lot of her problem  States that she had some old Xanax at home and tried 1 and felt better  She has a history of anxiety and states the past thinks she has had panic attacks  She also reports that recently her child was admitted to [facility] for RSV she expects he will be in there for a few more days  She states she is breast-feeding and pumping and does not want to use anything that could interfere with her ability to breast feed safely  suicidal homicidal ideations or homicidal ideations  Feels like Sri Diaz is the only 1 that can take care of the babyâ states she also has problems with OCD and she feels the only 1 she can trust her child with even briefly is her mother  States is having a hard time sleeping her mind is racing all the time  She describes herself as a type a and states she is 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  Review of Systems   Focused-Female:      Constitutional: No fever, no chills, feels well, no tiredness, no recent weight gain or loss  ENT: no ear ache, no loss of hearing, no nosebleeds or nasal discharge, no sore throat or hoarseness        Cardiovascular: no complaints of slow or fast heart rate, no chest pain, no palpitations, no leg claudication or lower extremity edema  Respiratory: no complaints of shortness of breath, no wheezing, no dyspnea on exertion, no orthopnea or PND  Breasts: no complaints of breast pain, breast lump or nipple discharge  Gastrointestinal: no complaints of abdominal pain, no constipation, no nausea or diarrhea, no vomiting, no bloody stools  Genitourinary: no complaints of dysuria, no incontinence, no pelvic pain, no dysmenorrhea, no vaginal discharge or abnormal vaginal bleeding  Musculoskeletal: no complaints of arthralgia, no myalgia, no joint swelling or stiffness, no limb pain or swelling  Integumentary: no complaints of skin rash or lesion, no itching or dry skin, no skin wounds  Neurological: no complaints of headache, no confusion, no numbness or tingling, no dizziness or fainting  ROS Reviewed:    ROS reviewed  Active Problems   1  6 weeks postpartum follow-up (V24 2) (Z39 2)   2  Amenorrhea (626 0) (N91 2)   3  Back pain (724 5) (M54 9)   4  Blighted ovum (631 8) (O02 0)   5  Elevated BP without diagnosis of hypertension (796 2) (R03 0)   6  Encounter for pregnancy related examination in first trimester (V22 1) (Z34 91)   7  Encounter for pregnancy related examination in second trimester (V22 1) (Z34 92)   8  Encounter for pregnancy related examination in third trimester (V22 1) (Z34 93)   9  Encounter for supervision of normal first pregnancy in third trimester (V22 0) (Z34 03)   10  Encounter to determine fetal viability of pregnancy (V23 87) (O36 80X0)   11  Headache (784 0) (R51)   12  History of spontaneous  (V13 29) (Z87 59)   13  Lactation disorder (676 90) (O92 70)   14  Nausea and vomiting during pregnancy (643 90) (O21 9)   15  Positive urine pregnancy test (V72 42) (Z32 01)   16  Post partum depression (211 47,743) (F53)   17  Postpartum exam (V24 2) (Z39 2)   18   Second trimester pregnancy (V22 2) (Z34 92)    Past Medical History   Active Problems And Past Medical History Reviewed: The active problems and past medical history were reviewed and updated today  Surgical History   1  History of Dilation And Curettage   2  History of Knee Arthroscopy (Therapeutic)   3  History of Tonsillectomy  Surgical History Reviewed: The surgical history was reviewed and updated today  Family History   Mother    1  Family history of hypertension (V17 49) (Z82 49)  Father    2  Family history of diabetes mellitus in father (V18 0) (Z83 3)   3  Family history of High blood cholesterol level  Family History Reviewed: The family history was reviewed and updated today  Social History    · Never a smoker  Social History Reviewed: The social history was reviewed and updated today  The social history was reviewed and is unchanged  Current Meds    1  Butalbital-APAP-Caffeine -40 MG Oral Capsule; Take 1-2 tabs PO Q4 hours PRN     headache; Therapy: 45NNW7086 to (Last Rx:99Jki2762)  Requested for: 35GPE7630 Ordered   2  Prenatal Vitamin TABS; TAKE 1 TABLET DAILY; Therapy: (Recorded:24Apr2017) to Recorded   3  Sertraline HCl - 100 MG Oral Tablet; Take 1 tablet daily; Therapy: 19GYP4096 to ([facility])  Requested for: 30VPU7312; Last     Rx:05Jan2018; Status: ACTIVE - Retrospective By Protocol Authorization Ordered  Medication List Reviewed: The medication list was reviewed and updated today  Allergies   1  No Known Drug Allergies  2  No Known Environmental Allergies   3  No Known Food Allergies    Vitals   Vital Signs    Recorded: 21IQL8230 36:48DT   Systolic 888   Diastolic 80   Weight 973 lb 1 oz   BMI Calculated 27 47   BSA Calculated 1 74     Physical Exam        Constitutional      General appearance: No acute distress, well appearing and well nourished  Pulmonary      Respiratory effort: No increased work of breathing or signs of respiratory distress         Psychiatric      Orientation to person, place, and time: Normal  Mood and affect: Normal        Attending Note   Collaborating Physician Note: Collaborating Physician: I discussed the case with the Advanced Practitioner and reviewed the note-- and-- I agree with the Advanced Practitioner note  Signatures    Electronically signed by : Alyssa Izaguirre; Jan 9 2018  4:31PM EST                       (Author)     Electronically signed by :  MANNY Cavanaugh ; Jan 9 2018  6:18PM EST                       (Author)

## 2018-01-10 NOTE — PROGRESS NOTES
Chief Complaint  pt here to repeat hcg level and prog  sent to [employer]  Active Problems    1  Amenorrhea (626 0) (N91 2)   2  Blighted ovum (631 8) (O02 0)   3  Encounter to determine fetal viability of pregnancy (V23 87) (O36 80X0)   4  History of spontaneous  (V13 29) (Z87 59)   5  Positive urine pregnancy test (V72 42) (Z32 01)    Allergies    1  No Known Drug Allergies    2  No Known Environmental Allergies   3  No Known Food Allergies    Results/Data  (1) PROGESTERONE [id number] 12:00AM [facility] [place]     Test Name Result Flag Reference   PROGESTERONE 28 0 ng/mL     Reference Ranges           Female              Follicular Phase     < 1 0              Luteal Phase      2 6-21 5              Post menopausal      < 0 5              Pregnancy              1st Trimester     4 1-34 0              2nd Trimester    24 0-76 0              3rd Trimester   52 0-302 0                       (1) HCG QUANT [id number] 12:00AM [facility] [place]     Test Name Result Flag Reference   HCG, TOTAL,  mIU/mL H    Reference Range  Nonpregnant or premenopausal    <5  Postmenopausal                 <10     Values from different assay methods may vary  The use of this assay to monitor or to diagnose   patients with cancer or any condition unrelated  to pregnancy has not been cleared or approved by  the FDA or the  of the assay  Plan  Positive urine pregnancy test    · (1) HCG QUANT; Status:Resulted - Requires Verification,Retrospective By Protocol  Authorization;   Done: 61BCK5524 12:00AM   · (1) PROGESTERONE; Status:Resulted - Requires Verification,Retrospective By Protocol  Authorization;   Done: 93SPG6798 12:00AM    Future Appointments    Date/Time Provider Specialty Site   2017 03:45 PM [facility]  [facility]     Signatures   Electronically signed by :  MANNY Zafar ; 2017 10:51AM EST (Author)

## 2018-01-11 NOTE — MISCELLANEOUS
Message  Message Free Text Note Form: 9/24/17 2335: pt notes constant abdominal tightness since early afternoon  Denies comes and go, vaginal bleeding or LOF  Encouraged evaluation on L&D; pt strongly desires to trial intervention at home first  Advise 1 quart water inatake over 5 minutes and observe x 30 minutes  If no change, strongly advise evaluation  Pt will call back with status after drinking  BEO  9/25/17 0030: pt notes lessening of tightness to Q3 minutes with soft belly between  Advised continued observation as above   BEO      Signatures   Electronically signed by : Jonny Jeans, M D ; Sep 25 2017  2:11PM EST                       (Author)

## 2018-01-11 NOTE — PROGRESS NOTES
OCT 5 2016         RE: Rina Wilhelm                                 To: SALVATORE Omalley O    MR#: [id number]                                  8 USKH OHTQ CW    : [address]   ENC: [id number]                             Fax: [phone]   (Exam #: [id number])      The LMP of this 27year old,  G1, P0-0-0-0 patient was unknown, her   working ELOY is MAY 6 2017 and the current gestational age is 10 weeks 4   days by [address] [place] [address]  A sonographic examination was performed on OCT   5 2016 using real time equipment  The ultrasound examination was performed   using abdominal technique  The patient has a BMI of 23 6  Her blood   pressure today was 133/72  Earliest ultrasound found in her record: 16 6w3d 17 ELOY      Thank you very much for your kind referral of Rina Wilhelm to the   [facility]  in [place] on 2016 for first arrester   ultrasound evaluation and  assessment  Cedrick Bowman is a 78-year-old   primigravida who is currently at 19-4/7 weeks gestation by an estimated   due date of May 6, 2017  Cedrick Bowman has no complaints today  She denies   vaginal bleeding or abdominal cramping  A viable intrauterine pregnancy   was noted on office ultrasound evaluation on 2016  Follow-up   ultrasound evaluation performed in your office 2 weeks later could not   identify a viable fetus  A quantitative beta hCG obtained on    was 71,541 and on  was 72,686  A collapsed gestational sac was documented  The fetal pole was not   visualized  INDICATIONS      fetal viability      Exam Types      Level I      ANATOMY COMMENTS      Transvaginal sonography reveals an abnormal appearing intrauterine   gestational sac, irregular in appearance  Neither a yolk sac nor embryonic   pole is identified   Uterine contour appears normal  Free fluid is not   identified in the posterior cul-de-sac  The findings are consistent with a   failed early pregnancy  ADNEXA      The left ovary appeared normal and measured 3 3 x 1 7 x 1 5 cm with a   volume of 4 4 cc  The right ovary was visualized and measured 4 7 x 4 5 x   3 6 cm with a volume of 39 8 cc  An anechoic right ovarian cyst is present  GENERAL COMMENT      Today's ultrasound findings and suggested follow-up were discussed in   detail with Adonay Rivera and her , and with you by phone  We discussed   management options at this time, including Cytotec administration, a D&E   procedure, and expectant management  She will discuss these options with   you later this week  The face to face time, in addition to time spent discussing ultrasound   results, was 10 approximately  minutes, greater than 50% of which was   spent during counseling and coordination of care  Delila Bail, R D M S Olean Kanner, M D     Maternal-Fetal Medicine   Electronically signed 10/05/16 18:28

## 2018-01-12 NOTE — MISCELLANEOUS
Message  attempted to call stepwise part t2 results WNL but communication sheet nor signed  message left to call [facility] for results      Active Problems    1  Amenorrhea (626 0) (N91 2)   2  Blighted ovum (631 8) (O02 0)   3  Encounter for pregnancy related examination in first trimester (V22 1) (Z34 81)   4  Encounter to determine fetal viability of pregnancy (V23 87) (O36 80X0)   5  Headache (784 0) (R51)   6  History of spontaneous  (V13 29) (Z87 59)   7  Positive urine pregnancy test (V72 42) (Z32 01)    Current Meds   1  Adult Low Dose Aspirin 81 MG TABS; TAKE 1 TABLET DAILY; Therapy: (Recorded:2017) to Recorded   2  Butalbital-APAP-Caffeine -40 MG Oral Capsule (Fioricet); Take 1 tabelt every 6   hrs  prn headache; Therapy: 36ZST8338 to (Evaluate:2017)  Requested for: 56LJA2136; Last   Rx:[id number] Ordered   3  Prenatal Vitamin TABS; TAKE 1 TABLET DAILY; Therapy: (Recorded:2017) to Recorded    Allergies    1  No Known Drug Allergies    2  No Known Environmental Allergies   3   No Known Food Allergies    Signatures   Electronically signed by : Sharee Stout, ; 2017  2:01PM EST                       (Author)

## 2018-01-12 NOTE — MISCELLANEOUS
Message  Discussed progesterone level not indicative of ovulation  Patient concerned because she follows her cycles closely and was sure she ovulated  Questionable chemical pregnancy in the beginning of January  Discussed it may have been too early to check as cycles typically return 4-6 weeks after SAB  Will monitory cycle and call with next period        Signatures   Electronically signed by : MANNY Thomas ; Feb 16 2017  3:43PM EST                       (Author)

## 2018-01-12 NOTE — PROGRESS NOTES
Chief Complaint  pt presents for a HCG/Prog blood draw      Active Problems    1  Amenorrhea (626 0) (N91 2)   2  Blighted ovum (631 8) (O02 0)   3  History of spontaneous  (V13 29) (Z87 59)   4  Positive urine pregnancy test (V72 42) (Z32 01)    Allergies    1  No Known Drug Allergies    2  No Known Environmental Allergies   3   No Known Food Allergies    Future Appointments    Date/Time Provider Specialty Site   2017 03:45 PM [facility], [facility] Schedule  [facility]     Signatures   Electronically signed by : MANNY Sawant ; Mar  2 2017  8:54AM EST

## 2018-01-12 NOTE — RESULT NOTES
Verified Results  (1) HCG QUANT [id number] 12:00AM Dayla [place]     Test Name Result Flag Reference   HCG, TOTAL,  mIU/mL H    Reference Range  Nonpregnant or premenopausal    <5  Postmenopausal                 <10     Values from different assay methods may vary  The use of this assay to monitor or to diagnose   patients with cancer or any condition unrelated  to pregnancy has not been cleared or approved by  the FDA or the  of the assay

## 2018-01-13 VITALS — DIASTOLIC BLOOD PRESSURE: 68 MMHG | SYSTOLIC BLOOD PRESSURE: 112 MMHG | HEIGHT: 63 IN

## 2018-01-13 VITALS
HEIGHT: 63 IN | BODY MASS INDEX: 27.61 KG/M2 | SYSTOLIC BLOOD PRESSURE: 131 MMHG | DIASTOLIC BLOOD PRESSURE: 71 MMHG | WEIGHT: 155.8 LBS

## 2018-01-13 VITALS
WEIGHT: 138.19 LBS | BODY MASS INDEX: 24.48 KG/M2 | SYSTOLIC BLOOD PRESSURE: 126 MMHG | DIASTOLIC BLOOD PRESSURE: 80 MMHG | HEIGHT: 63 IN

## 2018-01-13 VITALS
HEIGHT: 63 IN | SYSTOLIC BLOOD PRESSURE: 123 MMHG | WEIGHT: 172 LBS | BODY MASS INDEX: 30.48 KG/M2 | DIASTOLIC BLOOD PRESSURE: 71 MMHG

## 2018-01-13 VITALS
HEIGHT: 63 IN | DIASTOLIC BLOOD PRESSURE: 78 MMHG | BODY MASS INDEX: 24.84 KG/M2 | WEIGHT: 140.19 LBS | SYSTOLIC BLOOD PRESSURE: 102 MMHG

## 2018-01-13 VITALS — BODY MASS INDEX: 31.89 KG/M2 | SYSTOLIC BLOOD PRESSURE: 118 MMHG | WEIGHT: 180 LBS | DIASTOLIC BLOOD PRESSURE: 82 MMHG

## 2018-01-14 NOTE — RESULT NOTES
Verified Results  US OB LESS THAN 14 WKS WITH TRANSVAGINAL [id number] 12:29PM Donal Freeze Order Number: [id number]    - Patient Instructions: To schedule this appointment, please contact Central Scheduling at [phone]  Test Name Result Flag Reference   US OB LESS THAN 14 WEEKS WITH TRANSVAGINAL (Report)     FIRST TRIMESTER OBSTETRIC ULTRASOUND, COMPLETE     INDICATION: Pregnancy with inconclusive fetal viability  Dating  LMP is 1/20/2017  COMPARISON: None     TECHNIQUE:  Transabdominal ultrasound of the pelvis was performed  Additional transvaginal imaging was then performed to better assess the gestation, myometrial/endometrial architecture and ovarian parenchymal detail  The study includes volumetric    sweeps and traditional still imaging technique  FINDINGS:     A single live intrauterine gestation is identified  Cardiac activity is present  Heart rate of 172 bpm      YOLK SAC: Present and normal in size and appearance  MEAN GESTATIONAL SAC SIZE: 35 mm = 8 weeks 4 days    MEAN CROWN RUMP LENGTH: 17 mm = 8 weeks 1 day   FETAL ANATOMY: Appropriate for gestational age  AMNIOTIC FLUID/SAC SHAPE: Within expected normal range  PLACENTA: The placenta is appropriate for gestational age  Tiny subchorionic hemorrhage  UTERUS/ADNEXA:    The uterus is retroverted and unremarkable  The ovaries are within normal limits, noting probable right ovarian corpus luteum  The cervix remains closed  No free fluid present  IMPRESSION:     Single live intrauterine gestation at 8 weeks 3 days based on average ultrasound age (range +/- 0 weeks 4 days)  ELOY of 10/29/2017  Follow-up ultrasound at 20 weeks gestation for assessment of anatomy may be considered         Workstation performed: [id number]     Signed by:   Geraldine Schmidt DO   3/24/17

## 2018-01-14 NOTE — RESULT NOTES
Verified Results  (1) HCG QUANT [id number] 04:17PM LoganleoBreanajona [place]     Test Name Result Flag Reference   HCG QUANTITATIVE 7146 mIU/mL H <=6   Expected Ranges:     Approximate               Approximate HCG  Gestation age          Concentration ( mIU/mL)  _____________          ______________________   Fillmore Oar                      HCG values  0 2-1                       5-50  1-2                           2-3                         100-5000  3-4                         500-74241  4-5                         1000-85065  5-6                         [id number]  6-8                         31462-455861  8-12                        [id number]

## 2018-01-15 NOTE — RESULT NOTES
Verified Results  (1) PROGESTERONE [id number] 12:00AM Indu Armas     Test Name Result Flag Reference   PROGESTERONE 7 5 ng/mL     Reference Ranges           Female              Follicular Phase     < 1 0              Luteal Phase      2 6-21 5              Post menopausal      < 0 5              Pregnancy              1st Trimester     4 1-34 0              2nd Trimester    24 0-76 0              3rd Trimester   52 0-302 0

## 2018-01-15 NOTE — PROGRESS NOTES
KRISTOPHER 15 2017         RE: Chika [facility]                                 To: Ob/Gyn Care   [facility] [address]  [place]   MR#: [id number]                                  Debra Perry [address]   :  [address]: [id number]                             Fax: [phone]   (Exam #: [id number])      The LMP of this 32year old,  G2, P0-0-1-0 patient was 2017, her   working ELOY is OCT 32 2017 and the current gestational age is 25 weeks 2   days by 1st Trimester Sono  A sonographic examination was performed on KRISTOPHER   15 2017 using real time equipment  The ultrasound examination was   performed using abdominal & vaginal techniques  The patient has a BMI of   27 6  Her blood pressure today was 131/71  Earliest ultrasound found in her record: 3-22-17  8w1d  10-31-17      Cardiac motion was observed at 148 bpm       INDICATIONS      fetal anatomical survey      Exam Types      Transvaginal   LEVEL II      RESULTS      Fetus # 1 of 1   Vertex presentation   Fetal growth appeared normal   Placenta Location = Anterior, low lying   No placenta previa   Placenta Grade = I      MEASUREMENTS (* Included In Average GA)      AC              16 3 cm        21 weeks 1 day * (66%)   BPD              4 8 cm        20 weeks 3 days* (52%)   HC              18 1 cm        20 weeks 3 days* (51%)   Femur            3 4 cm        20 weeks 6 days* (52%)      Nuchal Fold      3 9 mm   NBL              7 2 mm      Humerus          3 3 cm        21 weeks 1 day   (70%)      Cerebellum       2 1 cm        20 weeks 3 days   Biorbit          3 3 cm        21 weeks 0 days   CisternaMagna    3 6 mm      HC/AC           1 11   FL/AC           0 21   FL/BPD          0 71   EFW (Ac/Fl/Hc)   389 grams - 0 lbs 14 oz      THE AVERAGE GESTATIONAL AGE is 20 weeks 5 days +/- 10 days        AMNIOTIC FLUID         Largest Vertical Pocket = 5 7 cm   Amniotic Fluid: Normal CERVICAL EVALUATION      SUPINE      Cervical Length: 4 00 cm      OTHER TEST RESULTS           Funneling?: No             Dynamic Changes?: No        Resp  To TFP?: No      ANATOMY      Head                                    Normal   Face/Neck                               Normal   Th  Cav  Normal   Heart                                   See Details   Abd  Cav  Normal   Stomach                                 Normal   Right Kidney                            Normal   Left Kidney                             Normal   Bladder                                 Normal   Abd  Wall                               Normal   Spine                                   Normal   Extrems                                 Normal   Genitalia                               Normal   Placenta                                Normal   Umbl  Cord                              Normal   Uterus                                  Normal   PCI                                     Normal      ANATOMY DETAILS      Visualized Appearing Sonographically Normal:   HEAD: (Calvarium, BPD Level, Cavum, Lateral Ventricles, Choroid Plexus,   Cerebellum, Cisterna Magna);    FACE/NECK: (Neck, Nuchal Fold, Profile,   Orbits, Nose/Lips, Palate, Face);    TH  CAV  : (Lungs, Diaphragm); HEART: (Four Chamber View, Proximal Left Outflow, Proximal Right Outflow,   3VV, 3 Vessel Trachea, Short Axis of Greater Vessels, Ductal Arch,   Interventricular Septum, Interatrial Septum, IVC, SVC, Cardiac Axis,   Cardiac Position);    ABD  CAV : (Liver);    STOMACH, RIGHT KIDNEY, LEFT   KIDNEY, BLADDER, ABD  WALL, SPINE: (Cervical Spine, Thoracic Spine, Lumbar   Spine, Sacrum);    EXTREMS: (Lt Humerus, Rt Humerus, Lt Forearm, Rt   Forearm, Lt Hand, Rt Hand, Lt Femur, Rt Femur, Lt Low Leg, Rt Low Leg, Lt   Foot, Rt Foot);    GENITALIA, PLACENTA, UMBL   CORD, UTERUS, PCI      Suboptimally Visualized:   HEART: (Aortic Arch) ADNEXA      The left ovary appeared normal and measured 2 4 x 2 5 x 1 8 cm with a   volume of 5 6 cc  The right ovary was not visualized  IMPRESSION      [place] IUP   20 weeks and 5 days by this ultrasound  (ELOY=OCT 28 2017)   20 weeks and 2 days by 1st Tri Sono  (ELOY=OCT 31 2017)   Vertex presentation   Fetal growth appeared normal   Regular fetal heart rate of 148 bpm   Anterior, low lying placenta   No placenta previa      GENERAL COMMENT      On exam today the patient appears well, in no acute distress, and denies   any complaints  Her abdomen is non-tender  The patient had Stepwise Sequential Screening at our Center reported   through [employer]  Her risk for trisomy 21 before screening was   1:790, after screening her risk is 1:1300; her risk for Trisomy 25 was   <1:5000  The open neural tube defect risk after screening is 1:380  The fetal anatomic survey is complete except for suboptimal visualization   of the aortic arch  There is no sonographic evidence of fetal   abnormalities at this time  Good fetal movement and tone are seen  The   amniotic fluid volume appears normal   The placenta is anterior and   low-lying measuring 1 6 cm from the internal os  A transvaginal   ultrasound was performed to assess the cervix, which was not seen well   transabdominally  The cervical length was 4 0 centimeters, which is   normal for the current gestational age  There was no significant   funneling or dynamic changes appreciated  The patient was informed of   today's findings and all of her questions were answered  The limitations   of ultrasound were reviewed with the patient, which she accepts  We discussed appropriate follow-up in detail and I recommend Lin Yip   return in 12 weeks for a growth ultrasound and to reevaluate the placenta   location in relation to the internal os        Please note, in addition to the time spent discussing the results of the   ultrasound, I spent approximately 10 minutes of face-to-face time with the   patient, greater than 50% of which was spent in counseling and the   coordination of care for this patient  Thank you very much for allowing us to participate in the care of this   very nice patient  Should you have any questions, please do not hesitate   to contact our office  FANTA Ghotra M D     Electronically signed 06/15/17 12:55

## 2018-01-16 NOTE — RESULT NOTES
Verified Results  (Q) TEST AUTHORIZATION [id number] 12:00AM Indu Armas     Test Name Result Flag Reference   TEST(S) ORDERED ON$REQUISITION PROGESTERONE     TEST CODE: 745QHO     CLIENT CONTACT: BRITTNY AGUILERA     REPORT ALWAYS MESSAGE$SIGNATURE See Below     The laboratory testing on this patient was verbally requested  or confirmed by the ordering physician or his or her authorized  representative after contact with an employee of [employer]  Federal regulations require that we maintain on file written  authorization for all laboratory testing  Accordingly we are asking  that the ordering physician or his or her authorized representative  sign a copy of this report and promptly return it to the client    Signature:____________________________________________________     (1) HCG QUANT [id number] 12:00AM Indu Armas     Test Name Result Flag Reference   HCG, TOTAL, QN <2 mIU/mL     Reference Range  Nonpregnant or premenopausal    <5  Postmenopausal                 <10     Values from different assay methods may vary  The use of this assay to monitor or to diagnose   patients with cancer or any condition unrelated  to pregnancy has not been cleared or approved by  the FDA or the  of the assay       (1) PROGESTERONE [id number] 12:00AM Indu Armas     Test Name Result Flag Reference   PROGESTERONE 2 1 ng/mL     Reference Ranges           Female              Follicular Phase     < 1 0              Luteal Phase      2 6-21 5              Post menopausal      < 0 5              Pregnancy              1st Trimester     4 1-34 0              2nd Trimester    24 0-76 0              3rd Trimester   52 0-302 0

## 2018-01-16 NOTE — PROGRESS NOTES
2017         RE: Jovanna Rick                                 To: Ob/Gyn Care   [facility] [address]  [place]   MR#: [id number]                                  [address]ra [address]   : [address]: [id number]                             Fax: [phone]   (Exam #: [id number])      The LMP of this 32year old,  G2, P0-0-1-0 patient was 2017, her   working ELOY is OCT 32 2017 and the current gestational age is 16 weeks 6   days by [address] [place] [address]  A sonographic examination was performed on 2017 using real time equipment  The ultrasound examination was   performed using abdominal technique  The patient has a BMI of 26 9  Her   blood pressure today was 112/68  Earliest ultrasound found in her record: 3-22-17  8w1d  10-31-17 Multiple   longitudinal and transverse sections revealed a eckert intrauterine   pregnancy with the fetus in vertex presentation  The placenta is anterior   in implantation, grade I in appearance  Cardiac motion was observed at 158 bpm       INDICATIONS      first trimester genetic screening      Exam Types      Level I      RESULTS      Fetus # 1 of 1   Vertex presentation   Fetal growth appeared normal      MEASUREMENTS (* Included In Average GA)      CRL              7 0 cm        13 weeks 0 days*   Nuchal Trans    2 00 mm      THE AVERAGE GESTATIONAL AGE is 13 weeks 0 days +/- 7 days  ANATOMY COMMENTS      Anatomic detail is limited at this gestational age  The yolk sac was not   noted  The fetal cranium appeared normal in shape and the nuchal   translucency was normal in size (2 0mm)  The nasal bone appears to be   present  The intracranial anatomy was unremarkable  Evaluation of the   spine revealed no obvious evidence for a neural tube defect  Anatomy of   the fetal thorax appeared within normal limits  The cardiac rhythm was   regular    Within the abdomen, stomach & bladder were visualized and the   abdominal wall appeared intact  A three vessel cord appears to be present  Active movement of the fetal body & extremities was seen  There is no   suspicion of a subchorionic bleed  The placental cord insertion was   normal    There is no suspicion of a uterine myoma  Free fluid is not seen   in the posterior cul-de-sac  ADNEXA      The left ovary was not visualized  The right ovary was not visualized  AMNIOTIC FLUID         Largest Vertical Pocket = 5 2 cm   Amniotic Fluid: Normal      IMPRESSION      [place] IUP   13 weeks and 0 days by this ultrasound  (ELOY=OCT 30 2017)   12 weeks and 6 days by 1st Tri Sono  (ELOY=OCT 31 2017)   Vertex presentation   Fetal growth appeared normal   Regular fetal heart rate of 158 bpm   Anterior placenta      CONSULT COMMENT      Thank you very much for requesting a consultation this very nice patient   for the indication of genetic screening  This is the patient's second   pregnancy  She has a history of a first trimester miscarriage treated by   dilation inter-hygiene October of last year  Her medical history is   unremarkable  Her surgical history is significant for orthopedic surgery,   a tonsillectomy  She denies the current use of tobacco, alcohol, or   drugs  She currently takes prenatal vitamins, progesterone, and low dose   aspirin  She has a family history of a first cousin with Down syndrome  She has a sister with spina bifida  No other family members appear to   have significant congenital birth defects  A review of systems is   otherwise negative  On exam, the patient appears well, in no acute   distress, and her abdomen is nontender        We discussed the options for genetic screening, including but not limited   to first trimester screening, second trimester screening, combined first   and second trimester screening, noninvasive prenatal testing (NIPT) for   patients at high risk and diagnostic screening through the use of CVS and   amniocentesis  We discussed the risks and benefits of each approach   including the sensitivities and false positive rates as well as the   difference between a screening test and a diagnostic test   At the   conclusion of our discussion the patient elected Stepwise Sequential   Screening to delineate her risk for fetal aneuploidy  She was given a   requisition to go to [facility] to have the first trimester blood work drawn  The first trimester portion of the screening results, encompassing age,   nuchal translucency, and biochemistry should be available within one week   of testing and will be reported from [facility]   The second stage of   sequential screening should be completed between the 15th and 21st week of   pregnancy (ideally between 16-18 weeks)  We discussed follow-up in detail and I recommend an anatomy ultrasound be   scheduled for 20 weeks gestation  Thank you very much for allowing us to participate in the care of this   very nice patient  Should you have any questions, please do not hesitate   to contact our office  Please note, in addition to the time spent discussing the results of the   ultrasound, I spent approximately 15 minutes of face-to-face time with the   patient, greater than 50% of which was spent in counseling and the   coordination of care for this patient  Portions of the record may have been created with voice recognition   software  Occasional wrong word or "sound a like" substitutions may have   occurred due to the inherent limitations of voice recognition software  Read the chart carefully and recognize, using context, where substitutions   have occurred  FANTA Alvares M D     Electronically signed 04/24/17 14:41

## 2018-01-16 NOTE — MISCELLANEOUS
Message  Message Free Text Note Form: Pt paged to report that she is 15 weeks and went to [place]  She reports that she has had n/v and epigastric pain  She reports there are no hospitals nearby  Pt advised adequate hydration  Also advised that she can drink gatorade 1/2 and 1/2 with water to keep her electrolytes balanced  Pt advised if no improvement in 24 hours or not able to maintain fluid to go to the nearest hospital for evaluation/hydration  pt agreeable with plan of care        Signatures   Electronically signed by : MANNY Mena ; May  7 2017 11:37AM EST                       (Author)

## 2018-01-17 NOTE — RESULT NOTES
Verified Results  (1) PROGESTERONE [id number] 04:17PM MarkomariposaGrzegorz bailey     Test Name Result Flag Reference   PROGESTERONE 31 80 ng/mL     Patients taking DHEA-S may have falsely elevated Progesterone levels  Recommend ordering Progesterone, [facility] [id number] to be done by Nayeli  PROGESTERONE:     Serum progesterone 5-25 ng/mL should not be the sole determinant in excluding pregnancy  Menstruating Females: Follicular phase 9 832-5 58 ng/mL   Luteal phase 2 25-24 2 ng/mL   Mid-luteal phase 8 76-21 6 ng/mL   Postmenopausal Females <0 200-0 901 ng/mL     Pregnant Females:   First trimester of pregnancy 11 4-41 0 ng/mL   Second trimester of pregnancy 13 8-156 ng/mL   Third trimester of pregnancy 51 4->200 ng/mL     (1) PREGNANCY TEST (HCG QUALITATIVE) [id number] 04:17PM Indu Armas     Test Name Result Flag Reference   PREGNANCY TEST Positive A Negative       Plan  Positive urine pregnancy test    · (1) HCG QUANT; Status:Active; Requested for:03Mar2017;    · (1) PROGESTERONE; Status:Active;  Requested QAP:[id number];

## 2018-01-17 NOTE — PROGRESS NOTES
SEP 7 2017         RE: Orlando Santino                                 To: Ob/Gyn Care   [facility]   MR#: [id number]                                  [address]   :  [address]: [id number]                             Fax: [phone]   (Exam #: [id number])      The LMP of this 32year old,  G2, P0-0-1-0 patient was 2017, her   working ELOY is OCT 32 2017 and the current gestational age is 26 weeks 2   days by 1st Trimester Sono  A sonographic examination was performed on SEP   7 2017 using real time equipment  The ultrasound examination was performed   using abdominal technique  The patient has a BMI of 30 5  Her blood   pressure today was 123/71  Earliest ultrasound found in her record: 3-22-17  8w1d  10-31-17      Cardiac motion was observed at 140 bpm       INDICATIONS      Interval growth assesment   Evaluate missed anatomy   low lying placenta      Exam Types      Level I      RESULTS      Fetus # 1 of 1   Vertex presentation   Fetal growth appeared normal   Placenta Location = Anterior   No placenta previa   Placenta Grade = I      MEASUREMENTS (* Included In Average GA)      AC              28 8 cm        32 weeks 6 days* (56%)   BPD              8 3 cm        33 weeks 2 days* (59%)   HC              30 6 cm        33 weeks 5 days* (57%)   Femur            6 2 cm        31 weeks 6 days* (42%)      Cerebellum       4 0 cm        33 weeks 2 days      HC/AC           1 06   FL/AC           0 21   FL/BPD          0 74   EFW (Ac/Fl/Hc)  2037 grams - 4 lbs 8 oz                 (50%)      THE AVERAGE GESTATIONAL AGE is 32 weeks 6 days +/- 18 days        AMNIOTIC FLUID      Q1: 3 4      Q2: 3 1      Q3: 2 5      Q4: 2 5   KHUSHI Total = 11 5 cm   Amniotic Fluid: Normal      ANATOMY DETAILS      Visualized Appearing Sonographically Normal:   HEAD: (Calvarium, BPD Level, Cavum, Lateral Ventricles, Choroid Plexus,   Cerebellum, Cisterna Magna); HEART: ([facility] [employer] [facility], Cardiac Axis,   Cardiac Position);    STOMACH, RIGHT KIDNEY, LEFT KIDNEY, BLADDER, PLACENTA      Suboptimally Visualized:   HEART: (Aortic Arch)      Not Visualized:   HEART: (Proximal Left Outflow, Proximal Right Outflow)      ANATOMY COMMENTS         The prior fetal anatomic survey was limited the area of the aortic arch  The views of the aortic arch which were limited in the prior anatomic   evaluation are still limited today and therefore the fetal anatomic survey   could still not be completed  BIOPHYSICAL PROFILE      The Biophysical Profile score was 8/8  Breathin  Movement: 2  Tone: 2  AFV: 2      IMPRESSION      [place] IUP   32 weeks and 6 days by this ultrasound  (ELOY=OCT 27 2017)   32 weeks and 2 days by 1st Tri Sono  (ELOY=OCT 31 2017)   Vertex presentation   Fetal growth appeared normal   Regular fetal heart rate of 140 bpm   Anterior placenta   No placenta previa      GENERAL COMMENT      On exam today the patient appears well, in no acute distress, and denies   any complaints  Her abdomen is non-tender  There has been appropriate interval fetal growth  Good fetal movement and   tone are seen  The amniotic fluid volume appears normal   The placenta is   anterior and it appears sonographically normal   The anatomic structures   listed above could not be optimally imaged today because of fetal   position; however, these structures were seen on a prior ultrasound and   appeared sonographically normal   The patient was informed of today's   findings and all of her questions were answered  The limitations of   ultrasound were reviewed with the patient   labor precautions as   well as fetal kick counts were reviewed  Recommend further ultrasounds as clinically indicated  Thank you very much for allowing us to participate in the care of this   very nice patient    Should you have any questions, please do not hesitate   to contact our office  Please note, in addition to the time spent discussing the results of the   ultrasound, I spent approximately 10 minutes of face-to-face time with the   patient, greater than 50% of which was spent in counseling and the   coordination of care for this patient  FANTA Agudelo , MANNY Storm     Electronically signed 09/07/17 10:26

## 2018-01-22 ENCOUNTER — ALLSCRIPTS OFFICE VISIT (OUTPATIENT)
Dept: OTHER | Facility: OTHER | Age: 28
End: 2018-01-22

## 2018-01-22 VITALS
DIASTOLIC BLOOD PRESSURE: 72 MMHG | WEIGHT: 164.44 LBS | BODY MASS INDEX: 29.13 KG/M2 | SYSTOLIC BLOOD PRESSURE: 112 MMHG

## 2018-01-22 VITALS
DIASTOLIC BLOOD PRESSURE: 74 MMHG | SYSTOLIC BLOOD PRESSURE: 102 MMHG | BODY MASS INDEX: 31.11 KG/M2 | WEIGHT: 175.56 LBS | HEIGHT: 63 IN

## 2018-01-22 VITALS
WEIGHT: 186.19 LBS | BODY MASS INDEX: 32.98 KG/M2 | DIASTOLIC BLOOD PRESSURE: 80 MMHG | SYSTOLIC BLOOD PRESSURE: 122 MMHG

## 2018-01-22 VITALS
WEIGHT: 168.44 LBS | SYSTOLIC BLOOD PRESSURE: 100 MMHG | DIASTOLIC BLOOD PRESSURE: 60 MMHG | BODY MASS INDEX: 29.84 KG/M2

## 2018-01-22 VITALS
DIASTOLIC BLOOD PRESSURE: 92 MMHG | HEIGHT: 63 IN | WEIGHT: 181.5 LBS | SYSTOLIC BLOOD PRESSURE: 148 MMHG | BODY MASS INDEX: 32.16 KG/M2

## 2018-01-22 VITALS
BODY MASS INDEX: 27.47 KG/M2 | SYSTOLIC BLOOD PRESSURE: 120 MMHG | DIASTOLIC BLOOD PRESSURE: 80 MMHG | WEIGHT: 155.06 LBS

## 2018-01-22 VITALS
DIASTOLIC BLOOD PRESSURE: 78 MMHG | HEIGHT: 63 IN | BODY MASS INDEX: 31.92 KG/M2 | WEIGHT: 180.13 LBS | SYSTOLIC BLOOD PRESSURE: 112 MMHG

## 2018-01-22 VITALS — WEIGHT: 171 LBS | DIASTOLIC BLOOD PRESSURE: 66 MMHG | BODY MASS INDEX: 30.29 KG/M2 | SYSTOLIC BLOOD PRESSURE: 130 MMHG

## 2018-01-22 VITALS
DIASTOLIC BLOOD PRESSURE: 70 MMHG | HEIGHT: 63 IN | BODY MASS INDEX: 29.29 KG/M2 | WEIGHT: 165.31 LBS | SYSTOLIC BLOOD PRESSURE: 110 MMHG

## 2018-01-22 VITALS
SYSTOLIC BLOOD PRESSURE: 122 MMHG | HEIGHT: 63 IN | DIASTOLIC BLOOD PRESSURE: 68 MMHG | BODY MASS INDEX: 26.81 KG/M2 | WEIGHT: 151.31 LBS

## 2018-01-22 VITALS — BODY MASS INDEX: 27.84 KG/M2 | WEIGHT: 157.19 LBS

## 2018-01-22 VITALS — DIASTOLIC BLOOD PRESSURE: 60 MMHG | SYSTOLIC BLOOD PRESSURE: 100 MMHG

## 2018-01-22 VITALS
BODY MASS INDEX: 30.72 KG/M2 | DIASTOLIC BLOOD PRESSURE: 78 MMHG | SYSTOLIC BLOOD PRESSURE: 118 MMHG | WEIGHT: 173.44 LBS

## 2018-01-23 VITALS
DIASTOLIC BLOOD PRESSURE: 82 MMHG | WEIGHT: 164.19 LBS | HEIGHT: 63 IN | BODY MASS INDEX: 29.09 KG/M2 | SYSTOLIC BLOOD PRESSURE: 116 MMHG

## 2018-01-23 NOTE — RESULT NOTES
Verified Results  (Q) THINPREP TIS PAP RFX HPV [id number] 12:00AM Alvaro Cervantes     Test Name Result Flag Reference   CLINICAL INFORMATION: None given     LMP: NONE GIVEN     PREV  PAP: NONE GIVEN     PREV  BX: NONE GIVEN     SOURCE: [facility]     STATEMENT OF ADEQUACY:      Satisfactory for evaluation  Endocervical/transformation zone component  present  Age and/or menstrual status not provided   INTERPRETATION/RESULT:      Negative for intraepithelial lesion or malignancy  COMMENT:      This Pap test has been evaluated with computer  assisted technology     CYTOTECHNOLOGIST:      Kimberly Clark CT(ASCP)  CT screening location: [address] [facility], [address] [place] [address]

## 2018-01-23 NOTE — MISCELLANEOUS
Message  Message Free Text Note Form: 1/7/18 0900: pt notes increased anxiety and compulsiveness despite increased dose of zoloft  Recently % dys ago, increased from 50 to 100 mg daily  Feels "foggy"  Feels "this is not agreeing with me " Advised f/u c 1*OB with consideration for psych referral  May decrease to 50 mg again in meantime   BEO      Signatures   Electronically signed by : MANNY Roque ; Jan 7 2018  9:13AM EST                       (Author)

## 2018-01-23 NOTE — PSYCH
Behavioral Health Outpatient Intake    Referred By: DIEGO Sanz AND ME  Intake Questions: there are no developmental disabilities  the patient does not have a hearing impairment  the patient does not have an ICM or CTT  patient is not taking injectable psychiatric medications  Employment: The patient is employed full time at [employer]  Emergency Contact Information:   Emergency Contact: DINESH ROMERO   Relationship to Patient:   Phone Number: [phone]   Previous Psychiatric Treatment: She has not been previously seen by a psychiatrist     She has not been previously seen by a therapist     History: no  service  She has not had combat service  She was not activated into federal active duty as a member of the [employer] or [employer]  Insurance Subscriber: Nikolas Damon   : [birth year]   Employer: [employer]   Primary Insurance: [id number]   [facility]    Phone number: [phone]   ID number: [id number]   Group number: [id number]     Presenting Problem (in patient's words): PPD  Substance Abuse: NONE  Previous Treatment: The patient has not been seen here in the past      Accepted as Patient   [facility] Carrington [facility]     Primary Care Physician: Trent Guidry       Signatures   Electronically signed by : Yonas Gu, ; Paresh 10 2018  8:57AM EST                       (Author)    Electronically signed by : Yonas Gu, ; Paresh 10 2018  9:42AM EST                       (Author)

## 2018-01-24 ENCOUNTER — OFFICE VISIT (OUTPATIENT)
Dept: BEHAVIORAL/MENTAL HEALTH CLINIC | Facility: CLINIC | Age: 28
End: 2018-01-24
Payer: COMMERCIAL

## 2018-01-24 VITALS
BODY MASS INDEX: 28.94 KG/M2 | SYSTOLIC BLOOD PRESSURE: 118 MMHG | HEIGHT: 63 IN | WEIGHT: 163.31 LBS | DIASTOLIC BLOOD PRESSURE: 80 MMHG

## 2018-01-24 DIAGNOSIS — F41.9 ANXIETY: Primary | ICD-10-CM

## 2018-01-24 PROCEDURE — 90791 PSYCH DIAGNOSTIC EVALUATION: CPT | Performed by: SOCIAL WORKER

## 2018-01-24 NOTE — PSYCH
Assessment    1  Anxiety (300 00) (F41 9)   2  Post partum depression (523 66,319) (F53)    Plan    1  ALPRAZolam 0 25 MG Oral Tablet (Xanax)    2  LORazepam 0 5 MG Oral Tablet; TAKE 1 TABLET Once PRN Anxiety   3  From  Sertraline HCl - 100 MG Oral Tablet Take 1 tablet daily To Sertraline HCl   - 50 MG Oral Tablet TAKE 1 TABLET DAILY    Chief Complaint  Referred by OB      History of Present Illness  31 yo  female that had her first born child 3 months ago  Patient stated she had never received treatment prior to having her child  She stated she lost a previous pregnancy September 2017 and she was prescribed Sertraline which she took for 1 month  She stated she took 50 mg only  She felt better after but shortly after she became pregnant  She had an uneventful pregnancy and she delivered her baby 3 months ago  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 and she was very stressed as her baby had RSV and was hospitalized 2 weeks ago and she was feeling so anxious that she felt the medication was not effective enough  She was given Alprazolam 0 25 mg as needed  She continues to take Sertraline 50 mg, and she stated that using 0 25 mg of Alprazolam as needed the anxiety has been controlled  She stated that she doesn't feel depressed but has been experiencing excessive anxiety about taking good care of her infant and she will have raising thoughts, she became very overwhelmed  She stated that she has a good support system  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   Her  is a  and he works long hours  Patient denies SI or HI   She stated that she had been so use to do everything in the home and after the baby she tried to do everything on her own  Review of Systems  anxiety, depression, emotional lability, disturbing or unusual thoughts, feelings, or sensations, emotional problems/concerns, sleep disturbances and decreased functioning ability  Constitutional: No fever, no chills, no recent weight gain or recent weight loss  ENT: no ear ache, no loss of hearing, no nosebleeds or nasal discharge, no sore throat or hoarseness  Cardiovascular: no complaints of slow or fast heart rate, no chest pain, no palpitations, no leg claudication or lower extremity edema  Respiratory: no complaints of shortness of breath, no wheezing, no dyspnea on exertion, no orthopnea or PND  Gastrointestinal: no complaints of abdominal pain, no constipation, no nausea or diarrhea, no vomiting, no bloody stools  Genitourinary: no complaints of dysuria, no incontinence, no pelvic pain, no dysmenorrhea, no vaginal discharge or abnormal vaginal bleeding  Musculoskeletal: no complaints of arthralgia, no myalgia, no joint swelling or stiffness, no limb pain or swelling  Integumentary: no complaints of skin rash or lesion, no itching or dry skin, no skin wounds  Neurological: no complaints of headache, no confusion, no numbness or tingling, no dizziness or fainting  ROS reviewed  Past Psychiatric History    Past Psychiatric History: None  Substance Abuse Hx    Substance Abuse History: denies  Active Problems    1  6 weeks postpartum follow-up (V24 2) (Z39 2)   2  Amenorrhea (626 0) (N91 2)   3  Anxiety (300 00) (F41 9)   4  Back pain (724 5) (M54 9)   5  Blighted ovum (631 8) (O02 0)   6  Elevated BP without diagnosis of hypertension (796 2) (R03 0)   7  Encounter for pregnancy related examination in first trimester (V22 1) (Z34 91)   8  Encounter for pregnancy related examination in second trimester (V22 1) (Z34 92)   9  Encounter for pregnancy related examination in third trimester (V22 1) (Z34 93)   10  Encounter for supervision of normal first pregnancy in third trimester (V22 0) (Z34 03)   11  Encounter to determine fetal viability of pregnancy (V23 87) (O36 80X0)   12  Headache (784 0) (R51)   13   History of spontaneous  (V13 29) (Z87 59)   14  Lactation disorder (676 90) (O92 70)   15  Nausea and vomiting during pregnancy (643 90) (O21 9)   16  Positive urine pregnancy test (V72 42) (Z32 01)   17  Post partum depression (030 70,491) (F53)   18  Postpartum exam (V24 2) (Z39 2)   19  Second trimester pregnancy (V22 2) (Z34 92)    Past Medical History    The active problems and past medical history were reviewed and updated today  Surgical History    The surgical history was reviewed and updated today  Allergies    1  No Known Drug Allergies    2  No Known Environmental Allergies   3  No Known Food Allergies    Current Meds   1  ALPRAZolam 0 25 MG Oral Tablet; TAKE 1 TABLET Bedtime PRN anxiety; Therapy: 57QAD5763 to (Evaluate:19Jan2018); Last Rx:09Jan2018 Ordered   2  Butalbital-APAP-Caffeine -40 MG Oral Capsule; Take 1-2 tabs PO Q4 hours PRN   headache; Therapy: 98PUU3465 to (Last Rx:87Wus7832)  Requested for: 64NRY7222 Ordered   3  Prenatal Vitamin TABS; TAKE 1 TABLET DAILY; Therapy: (Otto Escobar) to Recorded   4  Sertraline HCl - 100 MG Oral Tablet; Take 1 tablet daily; Therapy: 84GIP4129 to (Heydi Lobo)  Requested for: 37QFY5015; Last   Rx:05Jan2018 Ordered    The medication list was reviewed and updated today  Family Psych History  Mother    1  Family history of hypertension (V17 49) (Z82 49)  Father    2  Family history of diabetes mellitus in father (V18 0) (Z83 3)   3  Family history of High blood cholesterol level  No family hx of mental illness       The family history was reviewed and updated today  Social History    · Never a smoker  The social history was reviewed and updated today  The social history was reviewed and is unchanged   lives with   Good support system  Self employed, 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      History Of Phys/Sex Abuse Or Perpetration    History Of Phys/Sex Abuse or Perpetration: Denies        Physical Exam    Appearance: was calm and cooperative, adequate hygiene and grooming and good eye contact  Observed mood: anxious  Observed mood: affect appropriate  Speech: a normal rate and fluent  Thought processes: coherent/organized  Hallucinations: no hallucinations present  Thought Content: no delusions  Abnormal Thoughts: The patient has no suicidal thoughts and no homicidal thoughts  Orientation: The patient is oriented to person, place and time, oriented to person, oriented to place and oriented to time  Recent and Remote Memory: short term memory intact and long term memory intact  Attention Span And Concentration: concentration intact  Insight: Limited insight  Judgment: Her judgment was limited  Muscle Strength And Tone  Muscle strength and tone were normal    The patient is experiencing no localized pain  Initial Evaluation provided today  Treatment Recommendations: Continue Sertraline 50 mg   Switch Alprazolam to Lorazepam    Risks, Benefits And Possible Side Effects Of Medications: Risks, benefits, and possible side effects of medications explained to patient and patient verbalizes understanding  The patient has been filling controlled prescriptions on time as prescribed to Niurka Llamas [facility] program        End of Encounter Meds    1  Butalbital-APAP-Caffeine -40 MG Oral Capsule (Fioricet); Take 1-2 tabs PO Q4   hours PRN headache; Therapy: 68OFM4879 to (Last Rx:57Rth8027)  Requested for: 03DIC2712 Ordered    2  LORazepam 0 5 MG Oral Tablet; TAKE 1 TABLET Once PRN Anxiety; Therapy: 55PWM4557 to (Evaluate:56Mws3744); Last Rx:22Jan2018 Ordered   3  Sertraline HCl - 50 MG Oral Tablet; TAKE 1 TABLET DAILY; Therapy: 57IHK9981 to (Evaluate:22Apr2018)  Requested for: 71QWU0376; Last   Rx:22Jan2018 Ordered    4  Prenatal Vitamin TABS; TAKE 1 TABLET DAILY;    Therapy: (David Block) to Recorded    Signatures   Electronically signed by : Soila Jeong MANNY Perez ; Jan 22 2018  4:18PM EST                       (Author)

## 2018-01-24 NOTE — PSYCH
Assessment/Plan:      There are no diagnoses linked to this encounter  Subjective:      Patient ID: Nicolás James is a 32 y o  female  HPI:     Pre-morbid level of function and History of Present Illness: CLARY is a 32 Y O female referred for treatment through her OBGYN  She stated that she gave birth to a son in October and did well for the first six weeks  She stated that after that she began to experience increased anxiety regarding him and worried about bad things happening to him  In addition, he developed RSV and was hospitalized in the ICU  Due to the stress and increased anxiety she shared that her OBGYN prescribed Zoloft which she feels has been helping  Previous Psychiatric/psychological treatment/year: CLARY met with Dr Jerad Parsons regarding her medication  Current Psychiatrist/Therapist: Lily Miguel  Outpatient and/or Partial and Other Community Resources Used (CTT, ICM, VNA): Outpatient Psychiatry      Problem Assessment:     SOCIAL/VOCATION:  Family Constellation (include parents, relationship with each and pertinent Psych/Medical History):     Family History   Problem Relation Age of Onset    Hypertension Mother     Diabetes Father     Hypertension Maternal Grandmother     Diabetes Maternal Grandfather     Leukemia Maternal Grandfather     Hypertension Paternal Grandmother     Cancer Paternal Grandmother     Hypertension Paternal Grandfather     Cancer Paternal Grandfather        Mother: Close relationship  Spouse: Neel-happy   Father: Good relationship   Children: Umb-Pehagp-9 weeks   Sibling: CLARY has 7 siblings   Sibling:    Children:    Other:     Dariela relates best to [employer]  she lives with her  and son  she does not live alone       Domestic Violence: No past history of domestic violence    Additional Comments related to family/relationships/peer support: CLARY stated that she is very happy in her relationship with her  and enjoys being a mom  She shared that she grew up in a very large family with four sisters and three brothers  She is the second oldest  She states that they are all close but that growing up was hectic  She states that she has many friends  School or Work History (strengths/limitations/needs): 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 Yes     FUNCTIONAL STATUS: There has been a recent change in Lolis Jaramillo ability to do the following: None    Level of Assistance Needed/By Whom?: None    Dariela learns best by  reading, listening and demostration    SUBSTANCE ABUSE ASSESSMENT: no substance abuse    Substance/Route/Age/Amount/Frequency/Last Use: N/A    DETOX HISTORY: N/A    Previous detox/rehab treatment: N/A    HEALTH ASSESSMENT: has not gained 10 lbs or more in the last 6 months without trying, no nausea, no vomiting and no referral to PCP needed    LEGAL: No Mental Health Advance Directive or Power of  on file    Prenatal History: N/A    Delivery History: N/A    Developmental Milestones: N/A  Temperament as an infant was N/A  Temperament as a toddler was N/A  Temperament at school age was N/A  Temperament as a teenager was N/A      Risk Assessment:   The following ratings are based on my interview(s) with Dariela    Risk of Harm to Self:   Demographic risk factors include   Historical Risk Factors include None  Recent Specific Risk Factors include None  Additional Factors for a Child or Adolescent N/A    Risk of Harm to Others:   Demographic Risk Factors include NOne  Historical Risk Factors include None  Recent Specific Risk Factors include None    Access to Weapons:   Linda Mariner has access to the following weapons: None   The following steps have been taken to ensure weapons are properly secured: N/A    Based on the above information, the client presents the following risk of harm to self or others:  None    The following interventions are recommended:   no intervention changes    Notes regarding this Risk Assessment: None        Review Of Systems:     Mood Normal   Behavior Normal    Thought Content Normal   General Normal    Personality Normal   Other Psych Symptoms Normal   Constitutional Normal   ENT Normal   Cardiovascular Normal    Respiratory Normal    Gastrointestinal Normal   Genitourinary Normal    Musculoskeletal Negative   Integumentary Normal    Neurological Normal    Endocrine Normal          Mental status:  Appearance calm and cooperative    Mood mood appropriate   Affect affect appropriate    Speech a normal rate   Thought Processes normal thought processes   Hallucinations no hallucinations present    Thought Content no delusions   Abnormal Thoughts no suicidal thoughts    Orientation  oriented to person and place and time   Remote Memory short term memory intact and long term memory intact   Attention Span concentration intact   Intellect Appears to be of Average Intelligence   Fund of Knowledge displays adequate knowledge of current events   Insight Insight intact   Judgement judgment was intact   Muscle Strength Muscle strength and tone were normal   Language no difficulty naming common objects, no difficulty repeating a phrase  and no difficulty writing a sentence    Pain none   Pain Scale 0

## 2018-01-29 ENCOUNTER — TELEPHONE (OUTPATIENT)
Dept: PSYCHIATRY | Facility: CLINIC | Age: 28
End: 2018-01-29

## 2018-01-29 NOTE — TELEPHONE ENCOUNTER
----- Message from Garland Rhoades sent at 1/26/2018  2:14 PM EST -----  Regarding: Referral  PT needs psychiatric referral PPD PT

## 2018-01-30 ENCOUNTER — OFFICE VISIT (OUTPATIENT)
Dept: OBGYN CLINIC | Facility: MEDICAL CENTER | Age: 28
End: 2018-01-30
Payer: COMMERCIAL

## 2018-01-30 VITALS — WEIGHT: 157.9 LBS | DIASTOLIC BLOOD PRESSURE: 84 MMHG | SYSTOLIC BLOOD PRESSURE: 120 MMHG | BODY MASS INDEX: 27.97 KG/M2

## 2018-01-30 DIAGNOSIS — N64.9 BREAST LESION: Primary | ICD-10-CM

## 2018-01-30 DIAGNOSIS — Z91.89 BREASTFEEDING PROBLEM: ICD-10-CM

## 2018-01-30 PROCEDURE — 99213 OFFICE O/P EST LOW 20 MIN: CPT | Performed by: OBSTETRICS & GYNECOLOGY

## 2018-01-30 NOTE — PROGRESS NOTES
Subjective     Patricio Mcintosh is an 32 y o  female who presents for evaluation of a right breast nipple lesion   Change was noted in the past 3 months and has been gradually worsening since first identified  Patient does routinely do self breast exams  The lesion  is tender  Patient is currently breastfeeding but has not been able to breast feed from right breast therefore is pumping from this side in order to feed  This right nipple lesion has gotten significantly worse since delivery  It 1st started as a small slit and now has become a tender raised lesion not responsive to all purpose nipple cream, pumping, massage , warm compress, or lanolin  Not associated with other masses    The following portions of the patient's history were reviewed and updated as appropriate: allergies, current medications, past family history, past medical history, past social history, past surgical history and problem list     Review of Systems  Pertinent items are noted in HPI       Objective     /84   Wt 71 6 kg (157 lb 14 4 oz)   Breastfeeding? Yes   BMI 27 97 kg/m²   General appearance: alert and oriented, in no acute distress  Abdomen: soft, non-tender; bowel sounds normal; no masses,  no organomegaly  Breast: Left breast non tender, no lesions  Right breast - no palpable masses, there is a 3 mm raised lesion (wart like appearance) on the 11 o clock portion of niple, tender to palpation , this lesion I had seen weeks ago an looked like a bleb but now looks more raised and I am not able to elicit any drainage    Assessment/Plan     Breastfeeding Bleb continue to be in top of diagnosis : today we discussed that this is managed expectantly with warm compreses , cleaning of are and cortisone cream   However given persistent duration with associated pain offered patient second opinion with Dr Michael Florida   States interested in second opinion at this time

## 2018-02-19 ENCOUNTER — OFFICE VISIT (OUTPATIENT)
Dept: SURGICAL ONCOLOGY | Facility: CLINIC | Age: 28
End: 2018-02-19
Payer: COMMERCIAL

## 2018-02-19 VITALS
SYSTOLIC BLOOD PRESSURE: 126 MMHG | WEIGHT: 152.8 LBS | HEART RATE: 72 BPM | HEIGHT: 63 IN | BODY MASS INDEX: 27.07 KG/M2 | DIASTOLIC BLOOD PRESSURE: 72 MMHG | TEMPERATURE: 97.1 F | RESPIRATION RATE: 16 BRPM

## 2018-02-19 DIAGNOSIS — N64.9 BREAST LESION: Primary | ICD-10-CM

## 2018-02-19 PROCEDURE — 99243 OFF/OP CNSLTJ NEW/EST LOW 30: CPT | Performed by: SURGERY
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
## 2018-03-07 NOTE — PROGRESS NOTES
Education  [employer] Education 1st Trimester:   First Trimester Education provided:   benefits of breastfeeding, importance of exclusive breastfeeding, early initiation of breastfeeding, exclusive breastfeeding for the first 6 months and Pregnancy Essentials Reference Guide given   The patient is planning on breastfeeding  Prenatal education provided by: Varun cantu      Signatures   Electronically signed by :  AKHIL Real; Mar 31 2017 11:59AM EST                       (Author)

## 2018-03-22 ENCOUNTER — TELEPHONE (OUTPATIENT)
Dept: BEHAVIORAL/MENTAL HEALTH CLINIC | Facility: CLINIC | Age: 28
End: 2018-03-22

## 2018-03-22 DIAGNOSIS — F33.2 MDD (MAJOR DEPRESSIVE DISORDER), RECURRENT SEVERE, WITHOUT PSYCHOSIS (HCC): Primary | ICD-10-CM

## 2018-03-22 RX ORDER — SERTRALINE HYDROCHLORIDE 100 MG/1
100 TABLET, FILM COATED ORAL DAILY
Qty: 30 TABLET | Refills: 1 | Status: SHIPPED | OUTPATIENT
Start: 2018-03-22 | End: 2018-04-18 | Stop reason: SDUPTHER

## 2018-03-22 RX ORDER — ALPRAZOLAM 0.25 MG/1
0.25 TABLET ORAL 2 TIMES DAILY PRN
Qty: 60 TABLET | Refills: 0 | Status: SHIPPED | OUTPATIENT
Start: 2018-03-22 | End: 2018-07-25 | Stop reason: ALTCHOICE

## 2018-03-22 RX ORDER — SERTRALINE HYDROCHLORIDE 100 MG/1
100 TABLET, FILM COATED ORAL DAILY
COMMUNITY
Start: 2018-02-26 | End: 2018-03-22 | Stop reason: SDUPTHER

## 2018-03-22 NOTE — TELEPHONE ENCOUNTER
Pt called with a request for Zoloft 50 mg  She also had a question about her Ativan   5 mg- she was wondering if it is normal for Ativan to take 21/2 hours to kick in- as this is what she is experiencing  She wondered if she should have an increase in dosage of the Ativan  She said if you have time to give her a call

## 2018-03-22 NOTE — TELEPHONE ENCOUNTER
Patient called back stating that she is almost exclusively breast feeding and that her OB prescribed her 0 25mg of Xanax and states that this works fast for her  She says whatever you recommend will be fine, just that xanax is working faster for her

## 2018-04-18 ENCOUNTER — OFFICE VISIT (OUTPATIENT)
Dept: FAMILY MEDICINE CLINIC | Facility: CLINIC | Age: 28
End: 2018-04-18
Payer: COMMERCIAL

## 2018-04-18 VITALS
HEART RATE: 72 BPM | DIASTOLIC BLOOD PRESSURE: 70 MMHG | RESPIRATION RATE: 16 BRPM | OXYGEN SATURATION: 98 % | WEIGHT: 148 LBS | SYSTOLIC BLOOD PRESSURE: 110 MMHG | BODY MASS INDEX: 26.22 KG/M2 | HEIGHT: 63 IN

## 2018-04-18 DIAGNOSIS — L24.0 IRRITANT CONTACT DERMATITIS DUE TO DETERGENT: ICD-10-CM

## 2018-04-18 DIAGNOSIS — Z00.00 ENCOUNTER FOR WELLNESS EXAMINATION IN ADULT: Primary | ICD-10-CM

## 2018-04-18 DIAGNOSIS — F33.2 MDD (MAJOR DEPRESSIVE DISORDER), RECURRENT SEVERE, WITHOUT PSYCHOSIS (HCC): ICD-10-CM

## 2018-04-18 PROBLEM — O13.9 GESTATIONAL HYPERTENSION: Status: RESOLVED | Noted: 2017-10-25 | Resolved: 2018-04-18

## 2018-04-18 PROBLEM — N64.9 BREAST LESION: Status: RESOLVED | Noted: 2018-01-30 | Resolved: 2018-04-18

## 2018-04-18 PROBLEM — Z3A.39 39 WEEKS GESTATION OF PREGNANCY: Status: RESOLVED | Noted: 2017-10-25 | Resolved: 2018-04-18

## 2018-04-18 PROBLEM — Z91.89 BREASTFEEDING PROBLEM: Status: RESOLVED | Noted: 2018-01-30 | Resolved: 2018-04-18

## 2018-04-18 PROCEDURE — 3008F BODY MASS INDEX DOCD: CPT | Performed by: FAMILY MEDICINE

## 2018-04-18 PROCEDURE — 99395 PREV VISIT EST AGE 18-39: CPT | Performed by: FAMILY MEDICINE

## 2018-04-18 PROCEDURE — 99213 OFFICE O/P EST LOW 20 MIN: CPT | Performed by: FAMILY MEDICINE

## 2018-04-18 RX ORDER — MOMETASONE FUROATE 1 MG/G
CREAM TOPICAL DAILY PRN
Qty: 45 G | Refills: 1 | Status: SHIPPED | OUTPATIENT
Start: 2018-04-18 | End: 2018-07-25 | Stop reason: ALTCHOICE

## 2018-04-18 RX ORDER — PREDNISONE 20 MG/1
20 TABLET ORAL 2 TIMES DAILY WITH MEALS
Qty: 10 TABLET | Refills: 0 | Status: SHIPPED | OUTPATIENT
Start: 2018-04-18 | End: 2018-07-25 | Stop reason: ALTCHOICE

## 2018-04-18 NOTE — PATIENT INSTRUCTIONS
Trial of short course of oral prednisone to see treat systemically the allergic reaction  Use the Elocon topically as needed  You can also continue to use the Benadryl  We should use sunscreen when you are traveling in [facility] is the prednisone may change your sensation of too much sun  Continue follow-up with mental health provider at the right her medications  Health maintenance - we should find your actual cholesterol readings, as I do not see them in the chart  Otherwise sure health maintenance screenings are up-to-date
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
## 2018-04-18 NOTE — PROGRESS NOTES
Assessment/Plan:    No problem-specific Assessment & Plan notes found for this encounter  Diagnoses and all orders for this visit:    Encounter for wellness examination in adult    MDD (major depressive disorder), recurrent severe, without [facility]  -     sertraline (ZOLOFT) 50 mg tablet; Take 1 tablet (50 mg total) by mouth daily    Irritant contact dermatitis due to detergent  -     predniSONE 20 mg tablet; Take 1 tablet (20 mg total) by mouth 2 (two) times a day with meals  -     mometasone (ELOCON) 0 1 % cream; Apply topically daily as needed (itching)      Trial of short course of oral prednisone to see treat systemically the allergic reaction  Use the Elocon topically as needed  You can also continue to use the Benadryl  We should use sunscreen when you are traveling in [facility] is the prednisone may change your sensation of too much sun  Continue follow-up with mental health provider at the right her medications  Subjective:   Chief Complaint   Patient presents with    PT Initial Evaluation     new pt - est care    rash    Physical Exam               Patient ID: Jessica Bowers is a 25 y o  female  Patient noted rash generalized over various portions of the body  Feels it is attributed to recent use of laundry detergent fabric softeners  It has become less than she has changed these products and we washed all her clothes  No other new supplements or reported  No new dietary ingestions are noted  She has not had this problem before  Benadryl does help control the symptoms, however she is concerned as she is nursing a baby and may make the baby drowsy  The following portions of the patient's history were reviewed and updated as appropriate: allergies, current medications, past family history, past medical history, past social history and problem list     Review of Systems   Constitutional: Negative for appetite change, chills, diaphoresis and fever     HENT: Negative for ear pain, rhinorrhea, sinus pressure and sore throat  Eyes: Negative for discharge, redness and itching  Respiratory: Negative for cough, shortness of breath and wheezing  Cardiovascular: Negative for chest pain and palpitations  Rapid or slow heart rate   Gastrointestinal: Negative for abdominal pain, diarrhea, nausea and vomiting  Skin: Positive for rash  Negative for wound  Objective:      /70 (BP Location: Right arm, Patient Position: Sitting, Cuff Size: Standard)   Pulse 72   Resp 16   Ht 5' 3" (1 6 m)   Wt 67 1 kg (148 lb)   SpO2 98%   BMI 26 22 kg/m²          Physical Exam   Constitutional: She is oriented to person, place, and time  She appears well-developed and well-nourished  No distress  HENT:   Head: Normocephalic and atraumatic  Right Ear: Tympanic membrane, external ear and ear canal normal    Left Ear: Tympanic membrane, external ear and ear canal normal    Nose: No mucosal edema or rhinorrhea  Right sinus exhibits no maxillary sinus tenderness  Left sinus exhibits no maxillary sinus tenderness  Mouth/Throat: Uvula is midline  Mucous membranes are not pale and not dry  Normal dentition  No oropharyngeal exudate  Eyes: EOM are normal  Pupils are equal, round, and reactive to light  Right eye exhibits no discharge  Left eye exhibits no discharge  Neck: Normal range of motion  Neck supple  No thyromegaly present  Cardiovascular: Normal rate, regular rhythm, normal heart sounds and intact distal pulses  No murmur heard  Pulmonary/Chest: Effort normal and breath sounds normal  No respiratory distress  She has no wheezes  She has no rales  Musculoskeletal: Normal range of motion  She exhibits no edema or tenderness  Lymphadenopathy:     She has no cervical adenopathy  Neurological: She is alert and oriented to person, place, and time  No cranial nerve deficit  Skin: She is not diaphoretic  Patchy areas of mild erythema    No hives are noted   No vesicles are noted  Psychiatric: She has a normal mood and affect   Her behavior is normal

## 2018-06-20 DIAGNOSIS — O09.291 PRIOR MISCARRIAGE WITH PREGNANCY IN FIRST TRIMESTER, ANTEPARTUM: Primary | ICD-10-CM

## 2018-06-28 ENCOUNTER — TELEPHONE (OUTPATIENT)
Dept: OBGYN CLINIC | Facility: MEDICAL CENTER | Age: 28
End: 2018-06-28

## 2018-06-28 DIAGNOSIS — Z32.01 POSITIVE URINE PREGNANCY TEST: Primary | ICD-10-CM

## 2018-06-29 LAB
B-HCG SERPL-ACNC: 4165 MIU/ML
PROGEST SERPL-MCNC: 18.2 NG/ML

## 2018-07-02 ENCOUNTER — TELEPHONE (OUTPATIENT)
Dept: OBGYN CLINIC | Facility: MEDICAL CENTER | Age: 28
End: 2018-07-02

## 2018-07-02 DIAGNOSIS — Z32.01 POSITIVE BLOOD PREGNANCY TEST: Primary | ICD-10-CM

## 2018-07-06 ENCOUNTER — HOSPITAL ENCOUNTER (OUTPATIENT)
Dept: ULTRASOUND IMAGING | Facility: HOSPITAL | Age: 28
Discharge: HOME | End: 2018-07-06
Payer: COMMERCIAL

## 2018-07-06 DIAGNOSIS — Z32.01 POSITIVE BLOOD PREGNANCY TEST: ICD-10-CM

## 2018-07-06 PROCEDURE — 76801 OB US < 14 WKS SINGLE FETUS: CPT

## 2018-07-10 ENCOUNTER — TELEPHONE (OUTPATIENT)
Dept: OBGYN CLINIC | Facility: MEDICAL CENTER | Age: 28
End: 2018-07-10

## 2018-07-10 DIAGNOSIS — Z32.01 POSITIVE BLOOD PREGNANCY TEST: Primary | ICD-10-CM

## 2018-07-10 NOTE — TELEPHONE ENCOUNTER
----- Message from Tosha Garcia MD sent at 7/9/2018  9:20 AM EDT -----  Please inform patient of live IUP   Very early pregnancy recommend repeat US in 10 days thanks

## 2018-07-19 DIAGNOSIS — O09.291 PRIOR MISCARRIAGE WITH PREGNANCY IN FIRST TRIMESTER, ANTEPARTUM: ICD-10-CM

## 2018-07-20 ENCOUNTER — HOSPITAL ENCOUNTER (OUTPATIENT)
Dept: RADIOLOGY | Facility: HOSPITAL | Age: 28
Discharge: HOME | End: 2018-07-20
Attending: OBSTETRICS & GYNECOLOGY
Payer: COMMERCIAL

## 2018-07-20 DIAGNOSIS — Z32.01 POSITIVE BLOOD PREGNANCY TEST: ICD-10-CM

## 2018-07-20 PROCEDURE — 76816 OB US FOLLOW-UP PER FETUS: CPT

## 2018-07-20 PROCEDURE — 76817 TRANSVAGINAL US OBSTETRIC: CPT

## 2018-07-25 ENCOUNTER — INITIAL PRENATAL (OUTPATIENT)
Dept: OBGYN CLINIC | Facility: MEDICAL CENTER | Age: 28
End: 2018-07-25

## 2018-07-25 DIAGNOSIS — Z3A.08 8 WEEKS GESTATION OF PREGNANCY: Primary | ICD-10-CM

## 2018-07-25 PROCEDURE — OBC

## 2018-07-25 RX ORDER — ASPIRIN 81 MG/1
1 TABLET, CHEWABLE ORAL DAILY
COMMUNITY
Start: 2018-06-21 | End: 2019-02-07 | Stop reason: ALTCHOICE

## 2018-07-25 NOTE — PROGRESS NOTES
OB Intake  o Patient presents for OB intake interview    o   - Hx of  delivery prior to 36 weeks 6 days: NO  o LMP: Patient's last menstrual period was 2018 (exact date)  o Tdap:  - Counseled to be given after 28 weeks  - Influenza vaccine discussed  o MRSA questionnaire:Negative  o Dental visit within last 6 months: YES    Interview education:  [facility] Pregnancy Essentials booklet given to patient  Reviewed and explained   Handouts given at todays visit  o [address] me phone application guide  o [facility] Baby & Me support center  o CDCs Response to [place]      Was in [facility] 2018  Denies mosquito bites, joint pain, rash fever, eye drainage  o [facility] Maternal Fetal [facility]  - Sequential screening pamphlet  Cystic fibrosis drawn with previous pregnancy    Result negative

## 2018-07-27 LAB
APPEARANCE UR: CLEAR
COLOR UR: YELLOW
GLUCOSE UR QL STRIP: NEGATIVE
HGB UR QL STRIP: NEGATIVE
KETONES UR QL STRIP: NEGATIVE
PH UR STRIP: 7.5 [PH] (ref 5–8)
PROT UR QL STRIP: NEGATIVE
SP GR UR STRIP: 1.02 (ref 1–1.03)

## 2018-08-09 LAB
ABO GROUP BLD: NORMAL
BASOPHILS # BLD AUTO: 51 CELLS/UL (ref 0–200)
BASOPHILS NFR BLD AUTO: 0.5 %
BLD GP AB SCN SERPL QL: NORMAL
EOSINOPHIL # BLD AUTO: 101 CELLS/UL (ref 15–500)
EOSINOPHIL NFR BLD AUTO: 1 %
ERYTHROCYTE [DISTWIDTH] IN BLOOD BY AUTOMATED COUNT: 13 % (ref 11–15)
HBV SURFACE AG SERPL QL IA: NORMAL
HCT VFR BLD AUTO: 40.2 % (ref 35–45)
HGB BLD-MCNC: 12.9 G/DL (ref 11.7–15.5)
HIV 1+2 AB+HIV1 P24 AG SERPL QL IA: NORMAL
LYMPHOCYTES # BLD AUTO: 2313 CELLS/UL (ref 850–3900)
LYMPHOCYTES NFR BLD AUTO: 22.9 %
MCH RBC QN AUTO: 27.6 PG (ref 27–33)
MCHC RBC AUTO-ENTMCNC: 32.1 G/DL (ref 32–36)
MCV RBC AUTO: 85.9 FL (ref 80–100)
MONOCYTES # BLD AUTO: 535 CELLS/UL (ref 200–950)
MONOCYTES NFR BLD AUTO: 5.3 %
NEUTROPHILS # BLD AUTO: 7100 CELLS/UL (ref 1500–7800)
NEUTROPHILS NFR BLD AUTO: 70.3 %
PLATELET # BLD AUTO: 298 THOUSAND/UL (ref 140–400)
PMV BLD REES-ECKER: 10.1 FL (ref 7.5–12.5)
RBC # BLD AUTO: 4.68 MILLION/UL (ref 3.8–5.1)
RH BLD: NORMAL
RPR SER QL: NORMAL
RUBV IGG SERPL IA-ACNC: 1.23 INDEX
WBC # BLD AUTO: 10.1 THOUSAND/UL (ref 3.8–10.8)

## 2018-08-17 ENCOUNTER — TELEPHONE (OUTPATIENT)
Dept: OBGYN CLINIC | Facility: MEDICAL CENTER | Age: 28
End: 2018-08-17

## 2018-08-17 NOTE — TELEPHONE ENCOUNTER
States she has infected root canal, has emergency appt  With dentist this morning and wants to know if ok    Advised to keep appointment as scheduled

## 2018-08-22 ENCOUNTER — ROUTINE PRENATAL (OUTPATIENT)
Dept: PERINATAL CARE | Facility: CLINIC | Age: 28
End: 2018-08-22
Payer: COMMERCIAL

## 2018-08-22 VITALS
DIASTOLIC BLOOD PRESSURE: 72 MMHG | SYSTOLIC BLOOD PRESSURE: 116 MMHG | WEIGHT: 153.3 LBS | HEART RATE: 71 BPM | HEIGHT: 63 IN | BODY MASS INDEX: 27.16 KG/M2

## 2018-08-22 DIAGNOSIS — Z3A.12 12 WEEKS GESTATION OF PREGNANCY: Primary | ICD-10-CM

## 2018-08-22 PROCEDURE — 76813 OB US NUCHAL MEAS 1 GEST: CPT | Performed by: OBSTETRICS & GYNECOLOGY

## 2018-08-22 PROCEDURE — 99212 OFFICE O/P EST SF 10 MIN: CPT | Performed by: OBSTETRICS & GYNECOLOGY

## 2018-08-22 NOTE — PROGRESS NOTES
A fetal ultrasound was done at 12 3/7 weeks gestation for confirmation of dates as this pregnancy was conceived while breast-feeding, and nuchal translucency  Today's ultrasound findings and suggested follow-up were discussed in detail with the patient and her partner Katelynn Mcfarlane  1  Live fetus with size  = estimated dates and ELOY of 03-3-2019  The Sequential Screen was discussed in detail, including the sensitivity for detection of Down syndrome  Definitive prenatal diagnosis is possible only through genetic amniocentesis or CVS   The patient declined use of the Sequential Screen  Recommendations:  1  MSAFP at 16 weeks to be coordinated from your office  2   Fetal Level II ultrasound imaging is scheduled at about 20 weeks gestation, which was scheduled today      Melony Langford MD

## 2018-08-24 ENCOUNTER — INITIAL PRENATAL (OUTPATIENT)
Dept: OBGYN CLINIC | Facility: MEDICAL CENTER | Age: 28
End: 2018-08-24

## 2018-08-24 VITALS — DIASTOLIC BLOOD PRESSURE: 66 MMHG | SYSTOLIC BLOOD PRESSURE: 118 MMHG | BODY MASS INDEX: 27.1 KG/M2 | WEIGHT: 153 LBS

## 2018-08-24 DIAGNOSIS — O09.891 SHORT INTERVAL BETWEEN PREGNANCIES AFFECTING PREGNANCY IN FIRST TRIMESTER, ANTEPARTUM: ICD-10-CM

## 2018-08-24 DIAGNOSIS — Z11.3 SCREENING FOR STD (SEXUALLY TRANSMITTED DISEASE): Primary | ICD-10-CM

## 2018-08-24 DIAGNOSIS — Z34.91 FIRST TRIMESTER PREGNANCY: ICD-10-CM

## 2018-08-24 PROCEDURE — PNV: Performed by: OBSTETRICS & GYNECOLOGY

## 2018-08-24 NOTE — PROGRESS NOTES
Rema Vásquez is a 25y o  year old  at 12w5d for first prenatal visit  Nausea No Vomiting No   Exam done today - see OB flowsheet  Pap done No- normal    Gonorrhea and Chlamydia sent  Labs reviewed    Genetic testing - Had NT normal / did not have blood work as states will not change her management     Pt has been counseled re diet, exercise, weight gain, foods to avoid, vaccines in pregnancy, trisomy screening, travel precautions to include seat belt use and VTE risk reduction  She has been provided our pregnancy packet which includes how and when to contact providers, medication recommendations, dietary suggestions, breastfeeding information as well as websites for additional information, hospital and delivery concerns

## 2018-08-28 LAB
C TRACH RRNA SPEC QL NAA+PROBE: NOT DETECTED
N GONORRHOEA RRNA SPEC QL NAA+PROBE: NOT DETECTED

## 2018-09-21 ENCOUNTER — ROUTINE PRENATAL (OUTPATIENT)
Dept: OBGYN CLINIC | Facility: MEDICAL CENTER | Age: 28
End: 2018-09-21

## 2018-09-21 VITALS — WEIGHT: 156.1 LBS | BODY MASS INDEX: 27.65 KG/M2 | DIASTOLIC BLOOD PRESSURE: 76 MMHG | SYSTOLIC BLOOD PRESSURE: 110 MMHG

## 2018-09-21 DIAGNOSIS — Z3A.16 16 WEEKS GESTATION OF PREGNANCY: ICD-10-CM

## 2018-09-21 DIAGNOSIS — F41.9 ANXIETY: ICD-10-CM

## 2018-09-21 DIAGNOSIS — Z34.92 SECOND TRIMESTER PREGNANCY: Primary | ICD-10-CM

## 2018-09-21 PROCEDURE — PNV: Performed by: OBSTETRICS & GYNECOLOGY

## 2018-09-21 NOTE — PROGRESS NOTES
Yesica Arnold is a 25y o  year old  at 16w5d for routine prenatal visit    + FM, no vaginal bleeding, contractions, or LOF  Complaints: No Other than she feels like her anxiety sometimes increases as she had before with the loss of her first baby, and has been taking Zoloft and wanted know if it was ok to take the xanax only if needed  Advised the risk and benefits and recommended her to follow up with her therapist  Most recent ultrasound and labs reviewed  Has level II scheduled for 10/18, did not want to complete second part of SS as it wouldn't change her management       RTO in 4 weeks

## 2018-10-18 ENCOUNTER — ROUTINE PRENATAL (OUTPATIENT)
Dept: PERINATAL CARE | Facility: CLINIC | Age: 28
End: 2018-10-18
Payer: COMMERCIAL

## 2018-10-18 VITALS
BODY MASS INDEX: 28.46 KG/M2 | WEIGHT: 160.6 LBS | DIASTOLIC BLOOD PRESSURE: 77 MMHG | HEIGHT: 63 IN | HEART RATE: 72 BPM | SYSTOLIC BLOOD PRESSURE: 122 MMHG

## 2018-10-18 DIAGNOSIS — Z3A.21 21 WEEKS GESTATION OF PREGNANCY: Primary | ICD-10-CM

## 2018-10-18 DIAGNOSIS — Z36.86 ENCOUNTER FOR ANTENATAL SCREENING FOR CERVICAL LENGTH: ICD-10-CM

## 2018-10-18 DIAGNOSIS — Z36.3 ENCOUNTER FOR ANTENATAL SCREENING FOR MALFORMATION USING ULTRASOUND: ICD-10-CM

## 2018-10-18 PROBLEM — Z3A.20 20 WEEKS GESTATION OF PREGNANCY: Status: ACTIVE | Noted: 2018-09-21

## 2018-10-18 PROCEDURE — 76817 TRANSVAGINAL US OBSTETRIC: CPT | Performed by: OBSTETRICS & GYNECOLOGY

## 2018-10-18 PROCEDURE — 99212 OFFICE O/P EST SF 10 MIN: CPT | Performed by: OBSTETRICS & GYNECOLOGY

## 2018-10-18 PROCEDURE — 76805 OB US >/= 14 WKS SNGL FETUS: CPT | Performed by: OBSTETRICS & GYNECOLOGY

## 2018-10-18 NOTE — Clinical Note
Changed EDC to reflect ultrasound dating given new patient info of breastfeeding with irregular cycles  Hope that is ok with you  All 3 ultrasounds measuring nearly a week ahead

## 2018-10-19 ENCOUNTER — ROUTINE PRENATAL (OUTPATIENT)
Dept: OBGYN CLINIC | Facility: MEDICAL CENTER | Age: 28
End: 2018-10-19

## 2018-10-19 VITALS — WEIGHT: 160.5 LBS | SYSTOLIC BLOOD PRESSURE: 108 MMHG | BODY MASS INDEX: 28.43 KG/M2 | DIASTOLIC BLOOD PRESSURE: 72 MMHG

## 2018-10-19 DIAGNOSIS — Z34.92 SECOND TRIMESTER PREGNANCY: Primary | ICD-10-CM

## 2018-10-19 DIAGNOSIS — Z3A.21 21 WEEKS GESTATION OF PREGNANCY: ICD-10-CM

## 2018-10-19 DIAGNOSIS — F32.A DEPRESSION AFFECTING PREGNANCY: ICD-10-CM

## 2018-10-19 DIAGNOSIS — O99.340 DEPRESSION AFFECTING PREGNANCY: ICD-10-CM

## 2018-10-19 PROCEDURE — PNV: Performed by: OBSTETRICS & GYNECOLOGY

## 2018-10-22 NOTE — PROGRESS NOTES
Danae Jane is a 25y o  year old  at 21w3d for routine prenatal visit    + FM, no vaginal bleeding, contractions, or LOF  Complaints: No   Most recent ultrasound and labs reviewed    Patient needs to return in 12 weeks for growth US    Pt declines the flu shot  Continues the Zoloft 50 mg and requested refills sent to the pharmacy today- Sent

## 2018-12-04 ENCOUNTER — ROUTINE PRENATAL (OUTPATIENT)
Dept: OBGYN CLINIC | Facility: MEDICAL CENTER | Age: 28
End: 2018-12-04

## 2018-12-04 VITALS — SYSTOLIC BLOOD PRESSURE: 120 MMHG | DIASTOLIC BLOOD PRESSURE: 70 MMHG | BODY MASS INDEX: 30.11 KG/M2 | WEIGHT: 170 LBS

## 2018-12-04 DIAGNOSIS — Z34.92 SECOND TRIMESTER PREGNANCY: ICD-10-CM

## 2018-12-04 DIAGNOSIS — Z3A.27 27 WEEKS GESTATION OF PREGNANCY: Primary | ICD-10-CM

## 2018-12-04 PROCEDURE — PNV: Performed by: NURSE PRACTITIONER

## 2018-12-04 NOTE — PROGRESS NOTES
Linda Rand is a 25y o  year old  at 33w11d for routine prenatal visit    + FM, no vaginal bleeding, contractions, or LOF  Complaints: No   Most recent ultrasound and labs reviewed  Glucose and cbc drawn today  Declines flu shot, might consider getting it at pharmacy  Wants to discuss placental encapsulation? By Omar Lara at next visit with Dr Mike Abraham it helps with pp depression  Fkc, ptl precautions reviewed

## 2018-12-06 LAB
BASOPHILS # BLD AUTO: 0 CELLS/UL (ref 0–200)
BASOPHILS NFR BLD AUTO: 0 %
EOSINOPHIL # BLD AUTO: 117 CELLS/UL (ref 15–500)
EOSINOPHIL NFR BLD AUTO: 1 %
ERYTHROCYTE [DISTWIDTH] IN BLOOD BY AUTOMATED COUNT: 13.1 % (ref 11–15)
GLUCOSE 1H P 50 G GLC PO SERPL-MCNC: 120 MG/DL
HCT VFR BLD AUTO: 37.3 % (ref 35–45)
HGB BLD-MCNC: 12.5 G/DL (ref 11.7–15.5)
LYMPHOCYTES # BLD MANUAL: 1989 CELLS/UL (ref 850–3900)
LYMPHOCYTES NFR BLD AUTO: 17 %
MCH RBC QN AUTO: 28.4 PG (ref 27–33)
MCHC RBC AUTO-ENTMCNC: 33.5 G/DL (ref 32–36)
MCV RBC AUTO: 84.8 FL (ref 80–100)
METAMYELOCYTES # BLD: 585 CELLS/UL
METAMYELOCYTES NFR BLD MANUAL: 5 %
MONOCYTES # BLD AUTO: 468 CELLS/UL (ref 200–950)
MONOCYTES NFR BLD AUTO: 4 %
NEUTROPHILS # BLD AUTO: 8307 CELLS/UL (ref 1500–7800)
NEUTROPHILS NFR BLD AUTO: 71 %
NEUTS BAND # BLD: 234 CELLS/UL (ref 0–750)
NEUTS BAND NFR BLD MANUAL: 2 %
PLATELET # BLD AUTO: 190 THOUSAND/UL (ref 140–400)
PMV BLD REES-ECKER: 11.3 FL (ref 7.5–12.5)
RBC # BLD AUTO: 4.4 MILLION/UL (ref 3.8–5.1)
WBC # BLD AUTO: 11.7 THOUSAND/UL (ref 3.8–10.8)

## 2018-12-18 ENCOUNTER — ROUTINE PRENATAL (OUTPATIENT)
Dept: OBGYN CLINIC | Facility: MEDICAL CENTER | Age: 28
End: 2018-12-18

## 2018-12-18 VITALS — BODY MASS INDEX: 30.47 KG/M2 | WEIGHT: 172 LBS | DIASTOLIC BLOOD PRESSURE: 60 MMHG | SYSTOLIC BLOOD PRESSURE: 110 MMHG

## 2018-12-18 DIAGNOSIS — Z3A.29 29 WEEKS GESTATION OF PREGNANCY: Primary | ICD-10-CM

## 2018-12-18 DIAGNOSIS — O92.70 LACTATION DISORDER: ICD-10-CM

## 2018-12-18 DIAGNOSIS — Z34.92 SECOND TRIMESTER PREGNANCY: ICD-10-CM

## 2018-12-18 PROCEDURE — PNV: Performed by: NURSE PRACTITIONER

## 2018-12-18 NOTE — PROGRESS NOTES
Mario Shell is a 25y o  year old  at 34w10d for routine prenatal visit    + FM, no vaginal bleeding, contractions, or LOF  Complaints: No   Most recent ultrasound and labs reviewed  Declines Tdap, Will return birth plan next visit  Given order for breast pump

## 2019-01-03 ENCOUNTER — ROUTINE PRENATAL (OUTPATIENT)
Dept: OBGYN CLINIC | Facility: MEDICAL CENTER | Age: 29
End: 2019-01-03

## 2019-01-03 VITALS — WEIGHT: 175 LBS | SYSTOLIC BLOOD PRESSURE: 118 MMHG | BODY MASS INDEX: 31 KG/M2 | DIASTOLIC BLOOD PRESSURE: 62 MMHG

## 2019-01-03 DIAGNOSIS — Z3A.32 32 WEEKS GESTATION OF PREGNANCY: ICD-10-CM

## 2019-01-03 DIAGNOSIS — Z34.93 THIRD TRIMESTER PREGNANCY: Primary | ICD-10-CM

## 2019-01-03 PROBLEM — Z34.92 SECOND TRIMESTER PREGNANCY: Status: RESOLVED | Noted: 2018-09-21 | Resolved: 2019-01-03

## 2019-01-03 PROCEDURE — PNV: Performed by: OBSTETRICS & GYNECOLOGY

## 2019-01-03 NOTE — PROGRESS NOTES
Alfredo Limon is a 25y o  year old  at 32w0d for routine prenatal visit    + FM, no vaginal bleeding, contractions, or LOF  Complaints: No   Most recent ultrasound and labs reviewed    Rec repeat US at 32 wks for short interpregnancy interval, already scheduled for 1/4    Unsure about placental encapsulation yet    1 hr GTT WNL

## 2019-01-04 ENCOUNTER — ULTRASOUND (OUTPATIENT)
Dept: PERINATAL CARE | Facility: CLINIC | Age: 29
End: 2019-01-04
Payer: COMMERCIAL

## 2019-01-04 VITALS
HEART RATE: 87 BPM | BODY MASS INDEX: 29.84 KG/M2 | HEIGHT: 64 IN | SYSTOLIC BLOOD PRESSURE: 117 MMHG | DIASTOLIC BLOOD PRESSURE: 85 MMHG | WEIGHT: 174.8 LBS

## 2019-01-04 DIAGNOSIS — Z36.4 ULTRASOUND FOR ANTENATAL SCREENING FOR FETAL GROWTH RESTRICTION: ICD-10-CM

## 2019-01-04 DIAGNOSIS — Z3A.32 32 WEEKS GESTATION OF PREGNANCY: ICD-10-CM

## 2019-01-04 DIAGNOSIS — O09.893 SHORT INTERVAL BETWEEN PREGNANCIES COMPLICATING PREGNANCY, ANTEPARTUM, THIRD TRIMESTER: Primary | ICD-10-CM

## 2019-01-04 PROCEDURE — 99212 OFFICE O/P EST SF 10 MIN: CPT | Performed by: OBSTETRICS & GYNECOLOGY

## 2019-01-04 PROCEDURE — 76816 OB US FOLLOW-UP PER FETUS: CPT | Performed by: OBSTETRICS & GYNECOLOGY

## 2019-01-04 NOTE — LETTER
January 4, 2019     Satya Pace MD  [address]    Patient: Nicolás James   YOB: 1990   Date of Visit: 1/4/2019       Dear Dr Georgianna Goodell: Thank you for referring Anamaria Russell to me for evaluation  Below are my notes for this consultation  If you have questions, please do not hesitate to call me  I look forward to following your patient along with you  Sincerely,        Jorgito Pires MD        CC: No Recipients  Jorgito Pires MD  1/4/2019 11:47 AM  Sign at close encounter  Please refer to the [facility] ultrasound report in Ob Procedures for additional information regarding the visit to the [facility]  today

## 2019-01-04 NOTE — PROGRESS NOTES
Please refer to the [facility] ultrasound report in Ob Procedures for additional information regarding the visit to the [facility]  today

## 2019-01-15 ENCOUNTER — ROUTINE PRENATAL (OUTPATIENT)
Dept: OBGYN CLINIC | Facility: MEDICAL CENTER | Age: 29
End: 2019-01-15

## 2019-01-15 VITALS — SYSTOLIC BLOOD PRESSURE: 110 MMHG | WEIGHT: 178.7 LBS | BODY MASS INDEX: 30.67 KG/M2 | DIASTOLIC BLOOD PRESSURE: 80 MMHG

## 2019-01-15 DIAGNOSIS — Z34.93 THIRD TRIMESTER PREGNANCY: Primary | ICD-10-CM

## 2019-01-15 PROCEDURE — PNV: Performed by: OBSTETRICS & GYNECOLOGY

## 2019-01-15 NOTE — PROGRESS NOTES
Ella Bean is a 25y o  year old  at 33w5d for routine prenatal visit    + FM, no vaginal bleeding, contractions, or LOF  Complaints: No   Most recent ultrasound and labs reviewed    [address] [place] [address] reviewed

## 2019-01-29 ENCOUNTER — ROUTINE PRENATAL (OUTPATIENT)
Dept: OBGYN CLINIC | Facility: MEDICAL CENTER | Age: 29
End: 2019-01-29

## 2019-01-29 VITALS — DIASTOLIC BLOOD PRESSURE: 82 MMHG | BODY MASS INDEX: 30.9 KG/M2 | WEIGHT: 180 LBS | SYSTOLIC BLOOD PRESSURE: 122 MMHG

## 2019-01-29 DIAGNOSIS — Z86.19 HX OF COLD SORES: ICD-10-CM

## 2019-01-29 DIAGNOSIS — Z3A.35 35 WEEKS GESTATION OF PREGNANCY: ICD-10-CM

## 2019-01-29 DIAGNOSIS — Z34.93 THIRD TRIMESTER PREGNANCY: Primary | ICD-10-CM

## 2019-01-29 PROCEDURE — PNV: Performed by: OBSTETRICS & GYNECOLOGY

## 2019-01-29 RX ORDER — ACYCLOVIR 400 MG/1
400 TABLET ORAL
Qty: 6 TABLET | Refills: 5 | Status: SHIPPED | OUTPATIENT
Start: 2019-01-29 | End: 2019-02-24 | Stop reason: HOSPADM

## 2019-01-29 RX ORDER — BUTALBITAL, ACETAMINOPHEN AND CAFFEINE 50; 325; 40 MG/1; MG/1; MG/1
1 TABLET ORAL EVERY 6 HOURS PRN
Qty: 10 TABLET | Refills: 0 | Status: SHIPPED | OUTPATIENT
Start: 2019-01-29 | End: 2019-02-21 | Stop reason: SDUPTHER

## 2019-01-29 NOTE — PROGRESS NOTES
Latoya Farrell is a 25y o  year old  at 27w7d for routine prenatal visit    + FM, no vaginal bleeding, contractions, or LOF  Complaints: Yes - headaches - fioricet sent    Cold sores: acyclovir sent to pharmacy   Most recent ultrasound and labs reviewed    GBS done today   No PCN allergy   CVE  3

## 2019-02-06 ENCOUNTER — ROUTINE PRENATAL (OUTPATIENT)
Dept: OBGYN CLINIC | Facility: MEDICAL CENTER | Age: 29
End: 2019-02-06

## 2019-02-06 VITALS — BODY MASS INDEX: 31.14 KG/M2 | WEIGHT: 181.4 LBS | DIASTOLIC BLOOD PRESSURE: 78 MMHG | SYSTOLIC BLOOD PRESSURE: 118 MMHG

## 2019-02-06 DIAGNOSIS — Z3A.36 36 WEEKS GESTATION OF PREGNANCY: ICD-10-CM

## 2019-02-06 DIAGNOSIS — Z34.93 THIRD TRIMESTER PREGNANCY: Primary | ICD-10-CM

## 2019-02-06 PROCEDURE — PNV: Performed by: OBSTETRICS & GYNECOLOGY

## 2019-02-06 NOTE — PROGRESS NOTES
Home Jimenez is a 25y o  year old  at 36w7d for routine prenatal visit    + FM, no vaginal bleeding, contractions, or LOF  Complaints: No   Most recent ultrasound and labs reviewed    Labor precautions

## 2019-02-11 ENCOUNTER — ROUTINE PRENATAL (OUTPATIENT)
Dept: OBGYN CLINIC | Facility: MEDICAL CENTER | Age: 29
End: 2019-02-11

## 2019-02-11 VITALS — SYSTOLIC BLOOD PRESSURE: 120 MMHG | WEIGHT: 179.6 LBS | BODY MASS INDEX: 30.83 KG/M2 | DIASTOLIC BLOOD PRESSURE: 70 MMHG

## 2019-02-11 DIAGNOSIS — Z34.93 THIRD TRIMESTER PREGNANCY: Primary | ICD-10-CM

## 2019-02-11 PROCEDURE — PNV: Performed by: OBSTETRICS & GYNECOLOGY

## 2019-02-11 NOTE — PROGRESS NOTES
Sneha is a 25y o  year old  at 37w4d for routine prenatal visit    + FM, no vaginal bleeding, contractions, or LOF  Complaints: pelvic pressure    Most recent ultrasound and labs reviewed    2-3/50/-1

## 2019-02-21 ENCOUNTER — ROUTINE PRENATAL (OUTPATIENT)
Dept: OBGYN CLINIC | Facility: MEDICAL CENTER | Age: 29
End: 2019-02-21

## 2019-02-21 VITALS — BODY MASS INDEX: 31.21 KG/M2 | WEIGHT: 181.8 LBS | SYSTOLIC BLOOD PRESSURE: 118 MMHG | DIASTOLIC BLOOD PRESSURE: 76 MMHG

## 2019-02-21 DIAGNOSIS — Z3A.35 35 WEEKS GESTATION OF PREGNANCY: ICD-10-CM

## 2019-02-21 DIAGNOSIS — Z34.93 THIRD TRIMESTER PREGNANCY: Primary | ICD-10-CM

## 2019-02-21 DIAGNOSIS — Z3A.39 39 WEEKS GESTATION OF PREGNANCY: ICD-10-CM

## 2019-02-21 PROCEDURE — PNV: Performed by: OBSTETRICS & GYNECOLOGY

## 2019-02-21 RX ORDER — BUTALBITAL, ACETAMINOPHEN AND CAFFEINE 50; 325; 40 MG/1; MG/1; MG/1
1 TABLET ORAL EVERY 6 HOURS PRN
Qty: 10 TABLET | Refills: 1 | Status: SHIPPED | OUTPATIENT
Start: 2019-02-21 | End: 2019-02-25 | Stop reason: SDUPTHER

## 2019-02-21 NOTE — PROGRESS NOTES
Matthew Case is a 30y o  year old  at 39w0d for routine prenatal visit    + FM, no vaginal bleeding, contractions, or LOF  Complaints: Yes pressure and BH ctxns  Most recent ultrasound and labs reviewed      Requested to be checked and stripped: YISSELE 3-4/80/-1//soft    Requested to be scheduled for IOL on her duet date thus scheduled for 19 at 8 am

## 2019-02-22 ENCOUNTER — HOSPITAL ENCOUNTER (INPATIENT)
Facility: HOSPITAL | Age: 29
LOS: 2 days | Discharge: HOME | End: 2019-02-24
Attending: OBSTETRICS & GYNECOLOGY | Admitting: OBSTETRICS & GYNECOLOGY
Payer: COMMERCIAL

## 2019-02-22 ENCOUNTER — ANESTHESIA EVENT (INPATIENT)
Dept: PERIOP | Facility: HOSPITAL | Age: 29
End: 2019-02-22
Payer: COMMERCIAL

## 2019-02-22 ENCOUNTER — ANESTHESIA (INPATIENT)
Dept: PERIOP | Facility: HOSPITAL | Age: 29
End: 2019-02-22
Payer: COMMERCIAL

## 2019-02-22 DIAGNOSIS — IMO0002: Primary | ICD-10-CM

## 2019-02-22 LAB
ABO GROUP BLD: NORMAL
BASE EXCESS BLDCOA CALC-SCNC: -1.5 MMOL/L (ref 3–11)
BASE EXCESS BLDCOV CALC-SCNC: 0.6 MMOL/L (ref 1–9)
BLD GP AB SCN SERPL QL: NEGATIVE
ERYTHROCYTE [DISTWIDTH] IN BLOOD BY AUTOMATED COUNT: 13.7 % (ref 11.6–15.1)
HCO3 BLDCOA-SCNC: 26.1 MMOL/L (ref 17.3–27.3)
HCO3 BLDCOV-SCNC: 25.7 MMOL/L (ref 12.2–28.6)
HCT VFR BLD AUTO: 42.2 % (ref 34.8–46.1)
HGB BLD-MCNC: 13.4 G/DL (ref 11.5–15.4)
MCH RBC QN AUTO: 28.9 PG (ref 26.8–34.3)
MCHC RBC AUTO-ENTMCNC: 31.8 G/DL (ref 31.4–37.4)
MCV RBC AUTO: 91 FL (ref 82–98)
O2 CT VFR BLDCOA CALC: 5.9 ML/DL
OXYHGB MFR BLDCOA: 24.6 %
OXYHGB MFR BLDCOV: 47.9 %
PCO2 BLDCOA: 54.2 MM[HG] (ref 30–60)
PCO2 BLDCOV: 42.9 MM HG (ref 27–43)
PH BLDCOA: 7.3 [PH] (ref 7.23–7.43)
PH BLDCOV: 7.39 [PH] (ref 7.19–7.49)
PLATELET # BLD AUTO: 298 THOUSANDS/UL (ref 149–390)
PMV BLD AUTO: 10.8 FL (ref 8.9–12.7)
PO2 BLDCOA: 15 MM HG (ref 5–25)
PO2 BLDCOV: 20.7 MM HG (ref 15–45)
RBC # BLD AUTO: 4.64 MILLION/UL (ref 3.81–5.12)
RH BLD: POSITIVE
SAO2 % BLDCOV: 11.1 ML/DL
SPECIMEN EXPIRATION DATE: NORMAL
WBC # BLD AUTO: 13.25 THOUSAND/UL (ref 4.31–10.16)

## 2019-02-22 PROCEDURE — 86592 SYPHILIS TEST NON-TREP QUAL: CPT | Performed by: STUDENT IN AN ORGANIZED HEALTH CARE EDUCATION/TRAINING PROGRAM

## 2019-02-22 PROCEDURE — 4A1HXCZ MONITORING OF PRODUCTS OF CONCEPTION, CARDIAC RATE, EXTERNAL APPROACH: ICD-10-PCS | Performed by: OBSTETRICS & GYNECOLOGY

## 2019-02-22 PROCEDURE — 88307 TISSUE EXAM BY PATHOLOGIST: CPT | Performed by: PATHOLOGY

## 2019-02-22 PROCEDURE — 59400 OBSTETRICAL CARE: CPT | Performed by: OBSTETRICS & GYNECOLOGY

## 2019-02-22 PROCEDURE — 86901 BLOOD TYPING SEROLOGIC RH(D): CPT | Performed by: STUDENT IN AN ORGANIZED HEALTH CARE EDUCATION/TRAINING PROGRAM

## 2019-02-22 PROCEDURE — 86850 RBC ANTIBODY SCREEN: CPT | Performed by: STUDENT IN AN ORGANIZED HEALTH CARE EDUCATION/TRAINING PROGRAM

## 2019-02-22 PROCEDURE — 86900 BLOOD TYPING SEROLOGIC ABO: CPT | Performed by: STUDENT IN AN ORGANIZED HEALTH CARE EDUCATION/TRAINING PROGRAM

## 2019-02-22 PROCEDURE — 10907ZC DRAINAGE OF AMNIOTIC FLUID, THERAPEUTIC FROM PRODUCTS OF CONCEPTION, VIA NATURAL OR ARTIFICIAL OPENING: ICD-10-PCS | Performed by: OBSTETRICS & GYNECOLOGY

## 2019-02-22 PROCEDURE — 10D17ZZ EXTRACTION OF PRODUCTS OF CONCEPTION, RETAINED, VIA NATURAL OR ARTIFICIAL OPENING: ICD-10-PCS | Performed by: OBSTETRICS & GYNECOLOGY

## 2019-02-22 PROCEDURE — 0HQ9XZZ REPAIR PERINEUM SKIN, EXTERNAL APPROACH: ICD-10-PCS | Performed by: OBSTETRICS & GYNECOLOGY

## 2019-02-22 PROCEDURE — 99212 OFFICE O/P EST SF 10 MIN: CPT

## 2019-02-22 PROCEDURE — 82805 BLOOD GASES W/O2 SATURATION: CPT | Performed by: OBSTETRICS & GYNECOLOGY

## 2019-02-22 PROCEDURE — 85027 COMPLETE CBC AUTOMATED: CPT | Performed by: STUDENT IN AN ORGANIZED HEALTH CARE EDUCATION/TRAINING PROGRAM

## 2019-02-22 RX ORDER — OXYTOCIN 10 [USP'U]/ML
INJECTION, SOLUTION INTRAMUSCULAR; INTRAVENOUS
Status: DISCONTINUED
Start: 2019-02-22 | End: 2019-02-22 | Stop reason: WASHOUT

## 2019-02-22 RX ORDER — IBUPROFEN 600 MG/1
600 TABLET ORAL EVERY 6 HOURS PRN
Status: DISCONTINUED | OUTPATIENT
Start: 2019-02-22 | End: 2019-02-24 | Stop reason: HOSPADM

## 2019-02-22 RX ORDER — MIDAZOLAM HYDROCHLORIDE 1 MG/ML
INJECTION INTRAMUSCULAR; INTRAVENOUS AS NEEDED
Status: DISCONTINUED | OUTPATIENT
Start: 2019-02-22 | End: 2019-02-22 | Stop reason: SURG

## 2019-02-22 RX ORDER — EPHEDRINE SULFATE 50 MG/ML
INJECTION, SOLUTION INTRAVENOUS AS NEEDED
Status: DISCONTINUED | OUTPATIENT
Start: 2019-02-22 | End: 2019-02-22 | Stop reason: SURG

## 2019-02-22 RX ORDER — OXYCODONE HYDROCHLORIDE AND ACETAMINOPHEN 5; 325 MG/1; MG/1
1 TABLET ORAL EVERY 4 HOURS PRN
Status: DISCONTINUED | OUTPATIENT
Start: 2019-02-22 | End: 2019-02-24 | Stop reason: HOSPADM

## 2019-02-22 RX ORDER — PROPOFOL 10 MG/ML
INJECTION, EMULSION INTRAVENOUS AS NEEDED
Status: DISCONTINUED | OUTPATIENT
Start: 2019-02-22 | End: 2019-02-22 | Stop reason: SURG

## 2019-02-22 RX ORDER — ACETAMINOPHEN 325 MG/1
650 TABLET ORAL EVERY 4 HOURS PRN
Status: DISCONTINUED | OUTPATIENT
Start: 2019-02-22 | End: 2019-02-24 | Stop reason: HOSPADM

## 2019-02-22 RX ORDER — CARBOPROST TROMETHAMINE 250 UG/ML
INJECTION, SOLUTION INTRAMUSCULAR
Status: DISCONTINUED
Start: 2019-02-22 | End: 2019-02-22 | Stop reason: WASHOUT

## 2019-02-22 RX ORDER — MORPHINE SULFATE 4 MG/ML
2 INJECTION, SOLUTION INTRAMUSCULAR; INTRAVENOUS ONCE
Status: COMPLETED | OUTPATIENT
Start: 2019-02-22 | End: 2019-02-22

## 2019-02-22 RX ORDER — SUCCINYLCHOLINE CHLORIDE 20 MG/ML
INJECTION INTRAMUSCULAR; INTRAVENOUS AS NEEDED
Status: DISCONTINUED | OUTPATIENT
Start: 2019-02-22 | End: 2019-02-22 | Stop reason: SURG

## 2019-02-22 RX ORDER — OXYCODONE HYDROCHLORIDE AND ACETAMINOPHEN 5; 325 MG/1; MG/1
2 TABLET ORAL EVERY 4 HOURS PRN
Status: DISCONTINUED | OUTPATIENT
Start: 2019-02-22 | End: 2019-02-24 | Stop reason: HOSPADM

## 2019-02-22 RX ORDER — ONDANSETRON 2 MG/ML
INJECTION INTRAMUSCULAR; INTRAVENOUS AS NEEDED
Status: DISCONTINUED | OUTPATIENT
Start: 2019-02-22 | End: 2019-02-22 | Stop reason: SURG

## 2019-02-22 RX ORDER — ONDANSETRON 2 MG/ML
4 INJECTION INTRAMUSCULAR; INTRAVENOUS ONCE AS NEEDED
Status: DISCONTINUED | OUTPATIENT
Start: 2019-02-22 | End: 2019-02-22 | Stop reason: HOSPADM

## 2019-02-22 RX ORDER — MAGNESIUM HYDROXIDE 1200 MG/15ML
LIQUID ORAL AS NEEDED
Status: DISCONTINUED | OUTPATIENT
Start: 2019-02-22 | End: 2019-02-22 | Stop reason: HOSPADM

## 2019-02-22 RX ORDER — OXYTOCIN/RINGER'S LACTATE 30/500 ML
PLASTIC BAG, INJECTION (ML) INTRAVENOUS
Status: DISPENSED
Start: 2019-02-22 | End: 2019-02-23

## 2019-02-22 RX ORDER — EPHEDRINE SULFATE 50 MG/ML
50 INJECTION, SOLUTION INTRAVENOUS ONCE
Status: COMPLETED | OUTPATIENT
Start: 2019-02-22 | End: 2019-02-22

## 2019-02-22 RX ORDER — FENTANYL CITRATE 50 UG/ML
INJECTION, SOLUTION INTRAMUSCULAR; INTRAVENOUS AS NEEDED
Status: DISCONTINUED | OUTPATIENT
Start: 2019-02-22 | End: 2019-02-22 | Stop reason: SURG

## 2019-02-22 RX ORDER — FENTANYL CITRATE/PF 50 MCG/ML
50 SYRINGE (ML) INJECTION
Status: DISCONTINUED | OUTPATIENT
Start: 2019-02-22 | End: 2019-02-22 | Stop reason: HOSPADM

## 2019-02-22 RX ORDER — METHYLERGONOVINE MALEATE 0.2 MG/ML
INJECTION INTRAVENOUS
Status: DISCONTINUED
Start: 2019-02-22 | End: 2019-02-22 | Stop reason: WASHOUT

## 2019-02-22 RX ORDER — DIAPER,BRIEF,INFANT-TODD,DISP
1 EACH MISCELLANEOUS AS NEEDED
Status: DISCONTINUED | OUTPATIENT
Start: 2019-02-22 | End: 2019-02-24 | Stop reason: HOSPADM

## 2019-02-22 RX ORDER — LIDOCAINE HYDROCHLORIDE 10 MG/ML
INJECTION, SOLUTION INFILTRATION; PERINEURAL AS NEEDED
Status: DISCONTINUED | OUTPATIENT
Start: 2019-02-22 | End: 2019-02-22 | Stop reason: SURG

## 2019-02-22 RX ADMIN — LIDOCAINE HYDROCHLORIDE 80 MG: 10 INJECTION, SOLUTION INFILTRATION; PERINEURAL at 18:33

## 2019-02-22 RX ADMIN — MORPHINE SULFATE 2 MG: 4 INJECTION INTRAVENOUS at 18:01

## 2019-02-22 RX ADMIN — FENTANYL CITRATE 100 MCG: 50 INJECTION, SOLUTION INTRAMUSCULAR; INTRAVENOUS at 18:57

## 2019-02-22 RX ADMIN — CEFAZOLIN SODIUM 2000 MG: 10 INJECTION, POWDER, FOR SOLUTION INTRAVENOUS at 18:00

## 2019-02-22 RX ADMIN — IBUPROFEN 600 MG: 600 TABLET ORAL at 22:05

## 2019-02-22 RX ADMIN — SODIUM CHLORIDE, SODIUM LACTATE, POTASSIUM CHLORIDE, AND CALCIUM CHLORIDE 1000 ML: .6; .31; .03; .02 INJECTION, SOLUTION INTRAVENOUS at 17:58

## 2019-02-22 RX ADMIN — PROPOFOL 150 MG: 10 INJECTION, EMULSION INTRAVENOUS at 18:33

## 2019-02-22 RX ADMIN — BENZOCAINE AND LEVOMENTHOL: 200; 5 SPRAY TOPICAL at 22:14

## 2019-02-22 RX ADMIN — SUCCINYLCHOLINE CHLORIDE 100 MG: 20 INJECTION, SOLUTION INTRAMUSCULAR; INTRAVENOUS at 18:33

## 2019-02-22 RX ADMIN — FENTANYL CITRATE 100 MCG: 50 INJECTION, SOLUTION INTRAMUSCULAR; INTRAVENOUS at 18:43

## 2019-02-22 RX ADMIN — EPHEDRINE SULFATE 15 MG: 50 INJECTION, SOLUTION INTRAMUSCULAR; INTRAVENOUS; SUBCUTANEOUS at 18:31

## 2019-02-22 RX ADMIN — ONDANSETRON 4 MG: 2 INJECTION INTRAMUSCULAR; INTRAVENOUS at 18:43

## 2019-02-22 RX ADMIN — WITCH HAZEL 1 PAD: 500 SOLUTION RECTAL; TOPICAL at 22:14

## 2019-02-22 RX ADMIN — DEXAMETHASONE SODIUM PHOSPHATE 4 MG: 10 INJECTION INTRAMUSCULAR; INTRAVENOUS at 18:43

## 2019-02-22 RX ADMIN — MIDAZOLAM 2 MG: 1 INJECTION INTRAMUSCULAR; INTRAVENOUS at 18:49

## 2019-02-22 RX ADMIN — EPHEDRINE SULFATE 50 MG: 50 INJECTION, SOLUTION INTRAVENOUS at 20:04

## 2019-02-22 NOTE — ANESTHESIA PREPROCEDURE EVALUATION
Review of Systems/Medical History  Patient summary reviewed        Cardiovascular  Negative cardio ROS    Pulmonary  Negative pulmonary ROS        GI/Hepatic  Negative GI/hepatic ROS          Negative  ROS        Endo/Other  Negative endo/other ROS      GYN  Negative gynecology ROS          Hematology  Negative hematology ROS      Musculoskeletal  Negative musculoskeletal ROS        Neurology  Negative neurology ROS      Psychology   Negative psychology ROS              Physical Exam    Airway    Mallampati score: II  TM Distance: >3 FB  Neck ROM: full     Dental   No notable dental hx     Cardiovascular  Comment: Negative ROS, Rhythm: regular, Rate: normal, Cardiovascular exam normal    Pulmonary  Pulmonary exam normal     Other Findings        Anesthesia Plan  ASA Score- 2 Emergent    Anesthesia Type- general with ASA Monitors  Additional Monitors:   Airway Plan: ETT  Plan Factors-    Induction- intravenous  Postoperative Plan-     Informed Consent- Anesthetic plan and risks discussed with patient

## 2019-02-23 LAB
ERYTHROCYTE [DISTWIDTH] IN BLOOD BY AUTOMATED COUNT: 13.6 % (ref 11.6–15.1)
HCT VFR BLD AUTO: 27.8 % (ref 34.8–46.1)
HGB BLD-MCNC: 8.6 G/DL (ref 11.5–15.4)
MCH RBC QN AUTO: 28.9 PG (ref 26.8–34.3)
MCHC RBC AUTO-ENTMCNC: 30.9 G/DL (ref 31.4–37.4)
MCV RBC AUTO: 93 FL (ref 82–98)
PLATELET # BLD AUTO: 201 THOUSANDS/UL (ref 149–390)
PMV BLD AUTO: 10.2 FL (ref 8.9–12.7)
RBC # BLD AUTO: 2.98 MILLION/UL (ref 3.81–5.12)
WBC # BLD AUTO: 19.12 THOUSAND/UL (ref 4.31–10.16)

## 2019-02-23 PROCEDURE — 85027 COMPLETE CBC AUTOMATED: CPT | Performed by: STUDENT IN AN ORGANIZED HEALTH CARE EDUCATION/TRAINING PROGRAM

## 2019-02-23 RX ORDER — DOCUSATE SODIUM 100 MG/1
100 CAPSULE, LIQUID FILLED ORAL 2 TIMES DAILY
Status: DISCONTINUED | OUTPATIENT
Start: 2019-02-23 | End: 2019-02-24 | Stop reason: HOSPADM

## 2019-02-23 RX ORDER — FERROUS SULFATE 325(65) MG
325 TABLET ORAL 2 TIMES DAILY WITH MEALS
Status: DISCONTINUED | OUTPATIENT
Start: 2019-02-24 | End: 2019-02-24 | Stop reason: HOSPADM

## 2019-02-23 RX ADMIN — IBUPROFEN 600 MG: 600 TABLET ORAL at 03:46

## 2019-02-23 RX ADMIN — SERTRALINE HYDROCHLORIDE 50 MG: 50 TABLET ORAL at 08:55

## 2019-02-23 RX ADMIN — IBUPROFEN 600 MG: 600 TABLET ORAL at 12:11

## 2019-02-23 RX ADMIN — CEFAZOLIN SODIUM 2000 MG: 10 INJECTION, POWDER, FOR SOLUTION INTRAVENOUS at 01:54

## 2019-02-23 RX ADMIN — IBUPROFEN 600 MG: 600 TABLET ORAL at 18:22

## 2019-02-23 RX ADMIN — OXYCODONE HYDROCHLORIDE AND ACETAMINOPHEN 1 TABLET: 5; 325 TABLET ORAL at 07:13

## 2019-02-23 RX ADMIN — OXYCODONE HYDROCHLORIDE AND ACETAMINOPHEN 1 TABLET: 5; 325 TABLET ORAL at 21:45

## 2019-02-23 NOTE — PLAN OF CARE
Problem: Knowledge Deficit  Goal: Verbalizes understanding of labor plan  Description  Assess patient/family/caregiver's baseline knowledge level and ability to understand information  Provide education via patient/family/caregiver's preferred learning method at appropriate level of understanding  1  Provide teaching at level of understanding  2  Provide teaching via preferred learning method(s)    2019 0014 by Heavenly Lambert RN  Outcome: Progressing  2019 0013 by Heavenly Lambert RN  Outcome: Progressing     Problem: POSTPARTUM  Goal: Experiences normal postpartum course  Description  INTERVENTIONS:  - Monitor maternal vital signs  - Assess uterine involution and lochia  Outcome: Progressing  Goal: Appropriate maternal -  bonding  Description  INTERVENTIONS:  - Identify family support  - Assess for appropriate maternal/infant bonding   -Encourage maternal/infant bonding opportunities  - Referral to  or  as needed  Outcome: Progressing  Goal: Establishment of infant feeding pattern  Description  INTERVENTIONS:  - Assess breast/bottle feeding  - Refer to lactation as needed  Outcome: Progressing  Goal: Incision(s), wounds(s) or drain site(s) healing without S/S of infection  Description  INTERVENTIONS  - Assess and document risk factors for skin impairment   - Assess and document dressing, incision, wound bed, drain sites and surrounding tissue  - Initiate Nutrition services consult and/or wound management as needed  Outcome: Progressing

## 2019-02-23 NOTE — PROGRESS NOTES
Pt returned from PACU to room 311  Report received from PACU nurse  Performed recovery assessment  Bhumika Aldana RN at bedside to assist patient with breastfeeding  Pt reported off to Chon Rodarte RN

## 2019-02-23 NOTE — L&D DELIVERY NOTE
Vaginal Delivery Summary - OB/GYN   Araseli Moody 30 y o  female MRN: [id number]  Unit/Bed#: L&D 311-01 Encounter: [id number]          Predelivery Diagnosis:  1  Pregnancy at 39w1d     Postdelivery Diagnosis:  1  Same as above  2  Delivery of term     Procedure: Spontaneous Vaginal Delivery    Attending: Alan Trammell MD    Assistant: Ludwig Barton MD    Anesthesia: Epidural    QBL: [id number]    Complications: none apparent    Specimens: cord blood, arterial and venous cord blood gasses, placenta to pathology    Findings:   1  Viable male at 1728, with APGARS of 9 and 9 at 1 and 5 minutes respectively,  2  Adherent placenta, manually extracted in the operating room under anesthesia  3  1 degree laceration repaired with 3-0 Vicryl  4  Blood gases:   Arterial pH: 7 300   Arterial base excess: -1 5   Venous pH: 7 395   Venous base excess: 0 6    Disposition:  Patient tolerated the procedure well and was recovering in labor and delivery room     Brief history and labor course:  Ms Minna Camp is a 30 y o   at 39wk1d  She presented to labor and delivery for labor  Her pregnancy was uncomplicated  On exam in triage she was noted to be 5/90/0  She was admitted for labor and AROM  Description of procedure    After pushing for 4 minutes, at 1728 patient delivered a viable male , wt pending, apgars of 9 (1 min) and 9 (5 min)  The fetal vertex delivered spontaneously in a left occiput anterior presentation  Baby was checked for nuchal cord and found to have one loose loop reduced at the perineum  The anterior shoulder delivered atraumatically with maternal expulsive forces and the assistance of downward traction  The posterior shoulder delivered with maternal expulsive forces and the assistance of upward traction  The remainder of the fetus delivered spontaneously  Upon delivery, the infant was placed on the mothers abdomen and the cord was clamped and cut   Delayed cord clamping was performed  The infant was noted to cry spontaneously and was moving all extremities appropriately  There was no evidence for injury  Awaiting nurse resuscitators evaluated the   Arterial and venous cord blood gases and cord blood was collected for analysis  These were promptly sent to the lab  In the immediate post-partum, and the uterus was noted to contract down well with massage and pitocin  The placenta did not deliver spontaneously and found to be adherent  Manual extraction was initiated and the placenta fragmented  The vagina, cervix, perineum, and rectum were inspected and there was noted to be a first degree perineal laceration requiring repair  At the conclusion of the delivery, all needle, sponge, and instrument counts were noted to be correct  Patient did not continue to tolerate the procedure and the decision was made to proceed to the operating room for exam under anesthesia and manual extraction of placenta, and perineal laceration repair before being transferred to the post-partum floor  The attending, Dr Chris Puga, was present and participated in all key portions of the case          Alpesh Chirinos MD  2019  11:12 PM

## 2019-02-23 NOTE — PLAN OF CARE
Problem: Knowledge Deficit  Goal: Verbalizes understanding of labor plan  Description  Assess patient/family/caregiver's baseline knowledge level and ability to understand information  Provide education via patient/family/caregiver's preferred learning method at appropriate level of understanding  1  Provide teaching at level of understanding  2  Provide teaching via preferred learning method(s)    Outcome: Progressing

## 2019-02-23 NOTE — OP NOTE
OPERATIVE REPORT  PATIENT NAME: Selina Mcdaniels    :  1990  MRN: [id number]  Pt Location: [facility] OR ROOM 03    SURGERY DATE: 2019    Surgeon(s) and Role:     * Jammie Prince MD - Primary     * Darby Asher MD - Assisting    Preop Diagnosis:  Retained placenta or membranes without hemorrhage [O73 0]    Post-Op Diagnosis Codes:     * Retained placenta or membranes without hemorrhage [O73 0]    Procedure(s) (LRB):  Evacuation of retained placenta under ultrasound guidance    Specimen(s):  ID Type Source Tests Collected by Time Destination   1 : placenta Tissue Placenta TISSUE EXAM Jammie Prince MD 2019 190        Estimated Blood Loss:   200 mL    Drains:  * No LDAs found *  200cc urine, Zaman removed    Anesthesia Type:   Choice    Operative Indications:  Retained placenta or membranes without hemorrhage [O73 0]    Operative Findings:  Retained placenta, removed completely  Normal endometrial stripe    Complications:   None apparent    Procedure and Technique:  Patient was taken to the operating room where a time out was performed to confirm correct patient and correct procedure  The patient received 2 g of Ancef prior to reaching the operating room which was confirmed at time-out  General endotracheal anesthesia (GET) was administered and the patient was positioned on the OR table in the dorsal lithotomy position  All pressure points were padded and a shu hugger was placed to maintain control of core body temperature  A bimanual exam was performed and the uterus was noted to be 20-22 weeks in size and consistent with post delivery uterine tone  The patient was prepped and draped in the usual sterile fashion  Operative Technique    A Zaman catheter was introduced into the bladder  Under ultrasound guidance, retained placenta was visualized at the fundus of the uterus    The 's hand was then inserted into the cervix to reaching the uterine fundus and palpated at the abdominal wall  Gentle extraction took place under ultrasound-guided of clots and placental parts and membranes until there was clear evacuation of retained products as evidence by palpation and ultrasound finding of collapsed uterine cavity and a linear endometrial stripe  The specimen was then evaluated for completeness and sent to pathology  After evacuation, 30 units of Pitocin was administered through the IV, the uterus was noted to contract down appropriately  Bleeding was well controlled at this time  Her first-degree perineal laceration from delivery was then identified for repair  Three 0 Vicryl was used to reapproximate the laceration in layered fashion with 2 additional figure of 8 sutures at the perineal skin  Good approximation and hemostasis were confirmed at the conclusion of the repair  t the conclusion of the procedure, all needle, sponge, and instrument counts were noted to be correct x2  Patient tolerated the procedure well and was transferred to PACU in stable condition  Dr Stefani Aguilar was present and participated in all key portions of the case        Patient Disposition:  PACU     SIGNATURE: Emilio Packer MD  DATE: February 22, 2019  TIME: 7:21 PM

## 2019-02-23 NOTE — ANESTHESIA POSTPROCEDURE EVALUATION
Post-Op Assessment Note    CV Status:  Stable  Pain Score: 0    Pain management: adequate     Mental Status:  Alert and awake   Hydration Status:  Euvolemic and stable   PONV Controlled:  Controlled   Airway Patency:  Patent   Post Op Vitals Reviewed: Yes      Staff: Anesthesiologist           BP      Temp      Pulse    Resp      SpO2

## 2019-02-23 NOTE — PLAN OF CARE
Problem: POSTPARTUM  Goal: Experiences normal postpartum course  Description  INTERVENTIONS:  - Monitor maternal vital signs  - Assess uterine involution and lochia  Outcome: Progressing  Goal: Appropriate maternal -  bonding  Description  INTERVENTIONS:  - Identify family support  - Assess for appropriate maternal/infant bonding   -Encourage maternal/infant bonding opportunities  - Referral to  or  as needed  Outcome: Progressing  Goal: Establishment of infant feeding pattern  Description  INTERVENTIONS:  - Assess breast/bottle feeding  - Refer to lactation as needed  Outcome: Progressing  Goal: Incision(s), wounds(s) or drain site(s) healing without S/S of infection  Description  INTERVENTIONS  - Assess and document risk factors for skin impairment   - Assess and document dressing, incision, wound bed, drain sites and surrounding tissue  - Initiate Nutrition services consult and/or wound management as needed  Outcome: Progressing

## 2019-02-23 NOTE — PROGRESS NOTES
Progress Note - OB/GYN   Seda Fuller 30 y o  female MRN: [id number]  Unit/Bed#: L&D 311-01 Encounter: [id number]    Seda Fuller is a patient of [facility]    Assessment:  Post partum day #1 s/p  complicated by manual extraction of placenta (EUA) under general anesthesia and cummulative blood loss 1 8L (PPH), stable, and doing well this AM, asymptomatic of blood loss    Plan:  1  Manual extraction of placenta   - S/p 2g ancef   - EUA under general anesthesia   - QBL 1 8L (however likely over-estimated as EBL delivery 200cc and amniotic fluid present)   - Vital signs are stable, pt denies symptomatic anemia, ie dizziness, lightheadedness   - Hgb 13 4 -> 8 6  2  Depression   - Continue zoloft 50mg daily  3  Continue routine post partum care   - Encourage ambulation   - Encourage breastfeeding  4  Continue current meds    - See list below   - Pain controlled  5  Disposition   - VSS   - Anticipate discharge home tomorrow      Subjective/Objective     Chief Complaint:     Post partum day 1 from an  with no complaints today    Subjective:   Pain: no  Tolerating Oral Intake: yes  Voiding: yes  Flatus: yes  Bowel Movement: no  Ambulating: yes  Breastfeeding: Breastfeeding  Chest Pain: no  Shortness of Breath: no  Leg Pain/Discomfort: no  Lochia: minimal    Objective:   Vitals: /87 (BP Location: Right arm)   Pulse 102   Temp 98 2 °F (36 8 °C) (Oral)   Resp 18   Ht 5' 4" (1 626 m)   Wt 82 1 kg (181 lb)   LMP 2018 (Exact Date)   SpO2 99%   Breastfeeding?  Yes   BMI 31 07 kg/m²       Intake/Output Summary (Last 24 hours) at 2019 0810  Last data filed at 2019 1921  Gross per 24 hour   Intake 1300 ml   Output 2120 ml   Net -820 ml       Lab Results   Component Value Date    WBC 19 12 (H) 2019    HGB 8 6 (L) 2019    HCT 27 8 (L) 2019    MCV 93 2019     2019       Meds/Allergies   Current Facility-Administered Medications   Medication Dose Route Frequency    acetaminophen (TYLENOL) tablet 650 mg  650 mg Oral Q4H PRN    benzocaine-menthol-lanolin-aloe (DERMOPLAST) 20-0 5 % topical spray   Topical PRN    hydrocortisone 1 % cream 1 application  1 application Topical PRN    ibuprofen (MOTRIN) tablet 600 mg  600 mg Oral Q6H PRN    oxyCODONE-acetaminophen (PERCOCET) 5-325 mg per tablet 1 tablet  1 tablet Oral Q4H PRN    oxyCODONE-acetaminophen (PERCOCET) 5-325 mg per tablet 2 tablet  2 tablet Oral Q4H PRN    sertraline (ZOLOFT) tablet 50 mg  50 mg Oral Daily    witch hazel-glycerin (TUCKS) topical pad 1 pad  1 pad Topical PRN       Physical Exam:  General: in no apparent distress, well developed and well nourished and non-toxic  Cardiovascular: Cor RRR  Lungs: clear to auscultation bilaterally  Abdomen: abdomen is soft without significant tenderness, masses, organomegaly or guarding  Fundus: Firm and non-tender, 1 cm below the umbilicus  Lower extremeties: nontender    Michael Beauchamp DO  PGY-2 OB/GYN   2/23/2019 8:10 AM

## 2019-02-24 VITALS
HEART RATE: 90 BPM | DIASTOLIC BLOOD PRESSURE: 70 MMHG | SYSTOLIC BLOOD PRESSURE: 95 MMHG | WEIGHT: 181 LBS | BODY MASS INDEX: 30.9 KG/M2 | RESPIRATION RATE: 18 BRPM | TEMPERATURE: 98.1 F | OXYGEN SATURATION: 99 % | HEIGHT: 64 IN

## 2019-02-24 PROBLEM — G43.909 MIGRAINES: Status: ACTIVE | Noted: 2019-02-24

## 2019-02-24 PROCEDURE — 99024 POSTOP FOLLOW-UP VISIT: CPT | Performed by: OBSTETRICS & GYNECOLOGY

## 2019-02-24 RX ORDER — DOCUSATE SODIUM 100 MG/1
100 CAPSULE, LIQUID FILLED ORAL 2 TIMES DAILY
Qty: 60 CAPSULE | Refills: 2 | Status: SHIPPED | OUTPATIENT
Start: 2019-02-24 | End: 2019-02-25 | Stop reason: SDUPTHER

## 2019-02-24 RX ORDER — ACETAMINOPHEN 325 MG/1
650 TABLET ORAL EVERY 4 HOURS PRN
Qty: 30 TABLET | Refills: 0
Start: 2019-02-24 | End: 2019-04-01 | Stop reason: ALTCHOICE

## 2019-02-24 RX ORDER — FERROUS SULFATE 325(65) MG
325 TABLET ORAL 2 TIMES DAILY WITH MEALS
Qty: 60 TABLET | Refills: 2 | Status: SHIPPED | OUTPATIENT
Start: 2019-02-24 | End: 2019-02-25 | Stop reason: SDUPTHER

## 2019-02-24 RX ORDER — IBUPROFEN 600 MG/1
600 TABLET ORAL EVERY 6 HOURS PRN
Qty: 30 TABLET | Refills: 1 | Status: SHIPPED | OUTPATIENT
Start: 2019-02-24 | End: 2019-02-25 | Stop reason: SDUPTHER

## 2019-02-24 RX ADMIN — WITCH HAZEL 1 PAD: 500 SOLUTION RECTAL; TOPICAL at 08:29

## 2019-02-24 RX ADMIN — IBUPROFEN 600 MG: 600 TABLET ORAL at 08:28

## 2019-02-24 RX ADMIN — IBUPROFEN 600 MG: 600 TABLET ORAL at 00:42

## 2019-02-24 RX ADMIN — DOCUSATE SODIUM 100 MG: 100 CAPSULE, LIQUID FILLED ORAL at 08:29

## 2019-02-24 RX ADMIN — DOCUSATE SODIUM 100 MG: 100 CAPSULE, LIQUID FILLED ORAL at 00:42

## 2019-02-24 RX ADMIN — FERROUS SULFATE TAB 325 MG (65 MG ELEMENTAL FE) 325 MG: 325 (65 FE) TAB at 08:29

## 2019-02-24 RX ADMIN — SERTRALINE HYDROCHLORIDE 50 MG: 50 TABLET ORAL at 08:28

## 2019-02-24 NOTE — DISCHARGE INSTRUCTIONS
Breast Care for the Breast Feeding Mother   GENERAL INFORMATION:   Why is breast care important while I am breastfeeding? Your breasts will go through normal changes while you are breastfeeding  Sometimes breast and nipple problems can develop while you are breastfeeding  Learn about changes that are normal and those that may be a problem  Breast care can help you prevent and manage problems so you and your baby can enjoy the benefits of breastfeeding  What breast changes happen while I am breastfeeding? · For the first few days after your baby is born, your body makes a small amount of breast milk (colostrum)  Within about 2 to 5 days, your body will begin making mature milk  It may take up to 10 days or longer for mature milk to come in  When your mature milk comes in, your breasts will become full and firm  They may feel tender  · Breastfeeding your baby will decrease the full feeling in your breasts  You may feel a tingly sensation during feedings as milk is released from your breasts  This is called the milk let-down reflex  After 7 or more days, the fullness may feel like it has decreased  Your nipples should look the same as they did before you started breastfeeding  Breasts that feel full before and empty after breastfeeding are signs that breastfeeding is going well  What breast problems can occur while I am breastfeeding? · Nipple soreness  may occur when you begin to breastfeed your baby  You may have nipple soreness if your baby does not latch on to your breast correctly  Correct positioning and latch-on may decrease or stop the pain in your nipples  Work with your caregiver to help your baby latch on correctly  It may also be helpful to place warm, wet compresses on your nipples to help decrease pain  · Plugged milk ducts  may cause painful breast lumps  Plugged ducts may be caused by not emptying your breasts completely during feedings   When your baby pauses during breastfeeding, massage and gently squeeze your breast  Gentle massage may unplug a blocked milk duct  Pump out any milk left in your breasts after your baby is done breastfeeding  Avoid wearing tight tops, tight bras, or under-wire bras, because they may put pressure on your breasts  · Engorgement  may occur as your milk comes in soon after you begin breastfeeding  Engorgement may cause your breasts to become swollen and painful  Your breasts may also become engorged if you miss a feeding or you do not breastfeed on demand  The best way to decrease engorgement symptoms is to empty your breasts by feeding your baby often  Engorgement can make it hard for your baby to latch on to your breast  If this happens, express a small amount of milk and then have your baby latch on  Cold compresses, gel packs, or ice packs on your breasts can help decrease pain and swelling  Ask your caregiver how often and how long you should use cold, or ice packs  · A breast infection called mastitis  can develop if you have plugged milk ducts or engorgement  Mastitis causes your breasts to become red, swollen, and painful  You may also have flu-like symptoms, such as chills and a fever  Place heat on your breasts to help decrease the pain  You may want to place a moist, warm cloth on the painful breast or both of your breasts  Ask your caregiver how often to do this  Your caregiver may suggest that you take an NSAID, such as ibuprofen, to decrease pain and swelling  Your caregiver may also order antibiotics to treat mastitis  Ask your caregiver about feeding your baby when you have a breast infection  What can I do to help prevent or manage breast problems while I am breastfeeding? · Learn how to position your baby and latch him on correctly  To latch your baby correctly to your breast, make sure that his mouth covers most of your areola (dark area around your nipple)  He should not be attached only to the nipple   Your baby is latched on well if you feel comfortable and do not feel pain  A correct latch helps him get enough milk and can help to prevent sore nipples and other breast problems  There are several breastfeeding positions that you can try  Find the position that works best for you and your baby  Ask your caregiver for more information about how to hold and breastfeed your baby  · Prevent biting  Your baby may get teeth at about 1to 3months of age  To help prevent biting, break his suction once he is finished breastfeeding or if he has fallen asleep  To break his suction, slip a finger into the side of his mouth  If your baby bites you, respond with surprise or unhappiness  Offer praise when he does not bite you  · Breastfeed your baby regularly  Feed your baby 8 to 12 times a day  You may need to wake your baby at night to feed him  It is okay to feed from 1 or both breasts at each feeding  Your baby should breastfeed from both breasts equally over the course of a day  If your baby only feeds from 1 side during a feeding, offer your other breast to him first for the next feeding  · Schedule and keep follow-up visits  Talk to your baby's caregiver or your caregiver during follow-up visits if you have breast problems  Caregivers may suggest that you, or you and your partner, attend classes on breastfeeding  You also may want to join a breastfeeding support group  Caregivers may suggest that you see a lactation consultant  This is a caregiver who can help you with breastfeeding  When should I contact my caregiver? Contact your caregiver if:  · You have a fever and chills  · You have body aches and you feel like you do not have any energy  · One or both of your breasts is red, swollen or hard, painful, and feels warm or hot  · You have breast engorgement that does not get better within 24 hours  · You see or feel a lump in your breast that hurts when you touch it      · You have nipple pain during breastfeeding or between feedings  · Your nipples are red, dry, cracked, or bleeding, or they have scabs on them  · You have questions or concerns about your condition or care  CARE AGREEMENT:   You have the right to help plan your care  To help with this plan, you must learn as much as you can about breastfeeding  Ask your caregivers questions about breast care  Work with them to decide what care is best for you and your baby  The above information is an  only  It is not intended as medical advice for individual conditions or treatments  Talk to your doctor, nurse or pharmacist before following any medical regimen to see if it is safe and effective for you  [address] is for End User's use only and may not be sold, redistributed or otherwise used for commercial purposes  All illustrations and images included in CareNotes® are the copyrighted property of [employer]  or Babar Patel  Postpartum Bleeding   WHAT YOU NEED TO KNOW:   What is postpartum bleeding? Postpartum bleeding is vaginal bleeding after childbirth  This bleeding is normal, whether your baby was born vaginally or by   It contains blood and the tissue that lined the inside of your uterus when you were pregnant  What should I expect with postpartum bleeding? Postpartum bleeding usually lasts at least 10 days, and may last longer than 6 weeks  Your bleeding may range from light (barely staining a pad) to heavy (soaking a pad in 1 hour)  Usually, you have heavier bleeding right after childbirth, which slows over the next few weeks until it stops  The bleeding is red or dark brown with clots for the first 1 to 3 days  It then turns pink for several days, and then becomes a white or yellow discharge until it ends  When should I contact my healthcare provider? · Your bleeding increases, or you have heavy bleeding that soaks a pad in 1 hour for 2 hours in a row      · You pass large blood clots     · You are breathing faster than normal, or your heart is beating faster than normal     · You are urinating less than usual, or not at all  · You feel dizzy  · You have questions or concerns about your condition or care  When should I seek immediate care or call 911? · You are suddenly short of breath and feel lightheaded  · You have sudden chest pain  CARE AGREEMENT:   You have the right to help plan your care  Learn about your health condition and how it may be treated  Discuss treatment options with your caregivers to decide what care you want to receive  You always have the right to refuse treatment  The above information is an  only  It is not intended as medical advice for individual conditions or treatments  Talk to your doctor, nurse or pharmacist before following any medical regimen to see if it is safe and effective for you  © 2017 [facility] is for End User's use only and may not be sold, redistributed or otherwise used for commercial purposes  All illustrations and images included in CareNotes® are the copyrighted property of [employer]  or Babar Patel  Vaginal Delivery   WHAT YOU NEED TO KNOW:   A vaginal delivery occurs when your baby is born through your vagina (birth canal)  DISCHARGE INSTRUCTIONS:   Seek care immediately if:   · Your leg feels warm, tender, and painful  It may look swollen and red  · You have a fever  · You are urinating very little, or not at all  · You have heavy vaginal bleeding that fills 1 or more sanitary pads in 1 hour  · You feel weak, dizzy, or faint  Contact your healthcare provider if:   · Your abdominal or perineal pain does not go away, or gets worse  · You feel depressed  · You have questions or concerns about your condition or care  Medicines:  · NSAIDs , such as ibuprofen, help decrease swelling, pain, and fever   This medicine is available with or without a doctor's order  NSAIDs can cause stomach bleeding or kidney problems in certain people  If you take blood thinner medicine, always ask your healthcare provider if NSAIDs are safe for you  Always read the medicine label and follow directions  · Stool softeners  make it easier for you to have a bowel movement  You may need this medicine to treat or prevent constipation  · Take your medicine as directed  Contact your healthcare provider if you think your medicine is not helping or if you have side effects  Tell him or her if you are allergic to any medicine  Keep a list of the medicines, vitamins, and herbs you take  Include the amounts, and when and why you take them  Bring the list or the pill bottles to follow-up visits  Carry your medicine list with you in case of an emergency  Follow up with your healthcare provider:  Most women need to return 6 weeks after a vaginal delivery  Ask your healthcare provider how to care for your wounds or stitches, if you have them  Write down your questions so you remember to ask them during your visits  Activity:  Rest as much as possible  Try to keep all activities short  You may be able to do some exercise soon after you have your baby  Talk with your healthcare provider before you start exercising  If you work outside the home, ask when you can return to your job  Kegel exercises:  Kegel exercises may help your vaginal and rectal muscles heal faster  You can do Kegel exercises by tightening and relaxing the muscles around your vagina  Kegel exercises help make the muscles stronger  Breast care:  When your milk comes in, your breasts may feel full and hard  Ask how to care for your breasts, even if you are not breastfeeding  Constipation:  You may have constipation for a period of time after you have your baby  Do not try to push the bowel movement out if it is too hard  High-fiber foods and extra liquids can help you prevent constipation   Examples of high-fiber foods are fruit and bran  Prune juice and water are good liquids to drink  You may also be told to take over-the-counter fiber and stool softener medicines  Take these items as directed  Ask how to prevent or treat hemorrhoids  Perineum care: Your perineum is the area between your vagina and anus  Keep the area clean and dry  This will help it heal and prevent infection  Wash the area gently with soap and water when you bathe or shower  Rinse your perineum with warm water after you urinate or have a bowel movement  Your healthcare provider may suggest you use a warm sitz bath to help decrease pain  To take a sitz bath, fill a bathtub with 4 to 6 inches of warm water  You may also use a sitz bath pan that fits inside the toilet  Sit in the sitz bath for 20 minutes  Do this 2 to 3 times a day, or as directed  The warm water can help decrease pain and swelling  Vaginal discharge: You will have vaginal discharge, called lochia, after your delivery  The lochia is red or dark brown with clots for 1 to 3 days after the birth  The amount will decrease and turn pale pink or brown for 3 to 10 days  It will turn white or yellow on the 10th or 14th day  Lochia is usually gone within 3 weeks  Use a sanitary pad rather than a tampon to prevent a vaginal infection  You will have lochia for up to 3 weeks after your baby is born  Monthly periods: Your period may start again within 7 to 9 weeks after your baby is born  If you are breastfeeding, it may take longer for your period to start again  You can still get pregnant again even though you do not have your monthly period  Talk with your healthcare provider about a birth control method if you do not want to get pregnant  Mood changes: Many new mothers have some kind of mood changes after delivery  Some of these changes occur because of lack of sleep, hormone changes, and caring for a new baby  Some mood changes can be more serious, such as postpartum depression   Talk with your healthcare provider if you feel unable to care for yourself or your baby  Sexual activity:  Do not have sex until your healthcare provider says it is okay  You may notice you have a decreased desire for sex, or sex may be painful  You may need to use a vaginal lubricant (gel) to help make sex more comfortable  © 2017 [address] Information is for End User's use only and may not be sold, redistributed or otherwise used for commercial purposes  All illustrations and images included in CareNotes® are the copyrighted property of [employer]  or Babar Patel  The above information is an  only  It is not intended as medical advice for individual conditions or treatments  Talk to your doctor, nurse or pharmacist before following any medical regimen to see if it is safe and effective for you

## 2019-02-24 NOTE — PROGRESS NOTES
0900 pt rang and stated she was feeling light headed and nausea  B/p 103/73  Asked  If wanted meds for nausea- she declined  requested apple juice  Given  Pt laying in bed at this time  md notified  0930 pt laying in meds  States headache better   Eating breakfast  States feeling better

## 2019-02-24 NOTE — PROGRESS NOTES
Progress Note - OB/GYN   Elsa Moore 30 y o  female MRN: [id number]  Unit/Bed#: L&D 311-01 Encounter: [id number]    Assessment:  Post partum Day #2 s/p , stable, baby in room    Plan:  1) Manual extraction of placenta   S/p 2g Ancef x2 doses   Hgb 8 6, asymptomatic  2) Depression   Zoloft 50mg daily  3) Continue routine post partum care   Encourage ambulation   Encourage breastfeeding   Anticipate discharge today     Subjective/Objective   Chief Complaint:     Post delivery  Patient is doing well  Lochia WNL  States that she has a headache this morning that improved but did not resolve with motrin  States that this is not her usual migraine and that if it gets worse fioricet usually helps  Subjective:     Pain: yes, cramping, improved with meds  Tolerating PO: yes  Voiding: yes  Flatus: yes  Ambulating: yes  Breastfeeding:  yes  Chest pain: no  Shortness of breath: no  Leg pain: no  Lochia: minimal    Objective:     Vitals: /50 (BP Location: Right arm)   Pulse 86   Temp 98 2 °F (36 8 °C) (Oral)   Resp 16   Ht 5' 4" (1 626 m)   Wt 82 1 kg (181 lb)   LMP 2018 (Exact Date)   SpO2 99%   Breastfeeding? Yes   BMI 31 07 kg/m²     No intake or output data in the 24 hours ending 19 0658    Lab Results   Component Value Date    WBC 19 12 (H) 2019    HGB 8 6 (L) 2019    HCT 27 8 (L) 2019    MCV 93 2019     2019       Physical Exam:     Gen: AAOx3, NAD  CV: RRR  Lungs: CTA b/l  Abd: Soft, non-tender, non-distended, no rebound or guarding  Uterine fundus firm and non-tender, 2 cm below the umbilicus     Ext: Non tender    Kendra Barnes MD  2019  6:58 AM

## 2019-02-24 NOTE — DISCHARGE SUMMARY
Discharge Summary - OB/GYN   Kurt Delgado 30 y o  female MRN: [id number]  Unit/Bed#: L&D 311-01 Encounter: [id number]      Admission Date: 2019     Discharge Date: 2019    Admitting Diagnosis:   1  Pregnancy at 39w1d  2  Anxiety/depression    Discharge Diagnosis:   Same, delivered  Retained placenta, s/p manual extraction    Procedures: spontaneous vaginal delivery    Attending: Alexander Mendes MD    Hospital Course:     Kurt Delgado is a 30 y o  [id number] at 39w1d wks who was initially admitted for labor  She delivered a viable male  on 2019 at 1  Weight 7lbs 9oz via spontaneous vaginal delivery  Apgars were 9 (1 min) and 9 (5 min)   was transferred to  nursery  Patient tolerated the procedure well and was transferred to recovery in stable condition  Her post-partum course was complicated by retained placenta following delivery requiring manual extraction in the operating room under ultrasound guidance and general endotracheal anesthesia  Her post-partum bleeding was within normal limits and pain was well controlled with oral analgesics  On day of discharge, she was ambulating and able to reasonably perform all ADLs  She was voiding and had appropriate bowel function  Pain was well controlled  She was discharged home on post-partum day #2 without complications  Patient was instructed to follow up with her OB as an outpatient and was given appropriate warnings to call provider if she develops signs of infection or uncontrolled pain  Complications: none apparent    Condition at discharge: stable     Discharge instructions/Information to patient and family:   See after visit summary for information provided to patient and family  Provisions for Follow-Up Care:  See after visit summary for information related to follow-up care and any pertinent home health orders  Disposition: Home    Planned Readmission: No    Discharge Medications:   For a complete list of the patient's medications, please refer to her med rec

## 2019-02-25 DIAGNOSIS — Z3A.35 35 WEEKS GESTATION OF PREGNANCY: ICD-10-CM

## 2019-02-25 LAB — RPR SER QL: NORMAL

## 2019-02-25 RX ORDER — FERROUS SULFATE 325(65) MG
325 TABLET ORAL 2 TIMES DAILY WITH MEALS
Qty: 60 TABLET | Refills: 2 | Status: SHIPPED | OUTPATIENT
Start: 2019-02-25 | End: 2019-04-01 | Stop reason: ALTCHOICE

## 2019-02-25 RX ORDER — DOCUSATE SODIUM 100 MG/1
100 CAPSULE, LIQUID FILLED ORAL 2 TIMES DAILY
Qty: 60 CAPSULE | Refills: 2 | Status: SHIPPED | OUTPATIENT
Start: 2019-02-25 | End: 2019-02-26 | Stop reason: SDUPTHER

## 2019-02-25 RX ORDER — BUTALBITAL, ACETAMINOPHEN AND CAFFEINE 50; 325; 40 MG/1; MG/1; MG/1
1 TABLET ORAL EVERY 6 HOURS PRN
Qty: 10 TABLET | Refills: 1 | Status: SHIPPED | OUTPATIENT
Start: 2019-02-25 | End: 2019-02-26 | Stop reason: SDUPTHER

## 2019-02-25 RX ORDER — IBUPROFEN 600 MG/1
600 TABLET ORAL EVERY 6 HOURS PRN
Qty: 30 TABLET | Refills: 1 | Status: SHIPPED | OUTPATIENT
Start: 2019-02-25 | End: 2019-02-26 | Stop reason: SDUPTHER

## 2019-02-26 DIAGNOSIS — Z3A.35 35 WEEKS GESTATION OF PREGNANCY: ICD-10-CM

## 2019-02-27 RX ORDER — BUTALBITAL, ACETAMINOPHEN AND CAFFEINE 50; 325; 40 MG/1; MG/1; MG/1
1 TABLET ORAL EVERY 6 HOURS PRN
Qty: 10 TABLET | Refills: 1 | Status: SHIPPED | OUTPATIENT
Start: 2019-02-27 | End: 2019-12-11 | Stop reason: SDUPTHER

## 2019-02-27 RX ORDER — IBUPROFEN 600 MG/1
600 TABLET ORAL EVERY 6 HOURS PRN
Qty: 30 TABLET | Refills: 1 | Status: SHIPPED | OUTPATIENT
Start: 2019-02-27 | End: 2019-04-01 | Stop reason: ALTCHOICE

## 2019-02-27 RX ORDER — DOCUSATE SODIUM 100 MG/1
100 CAPSULE, LIQUID FILLED ORAL 2 TIMES DAILY
Qty: 60 CAPSULE | Refills: 2 | Status: SHIPPED | OUTPATIENT
Start: 2019-02-27 | End: 2019-04-01 | Stop reason: ALTCHOICE

## 2019-04-01 ENCOUNTER — OFFICE VISIT (OUTPATIENT)
Dept: FAMILY MEDICINE CLINIC | Facility: CLINIC | Age: 29
End: 2019-04-01
Payer: COMMERCIAL

## 2019-04-01 VITALS
OXYGEN SATURATION: 98 % | BODY MASS INDEX: 28.35 KG/M2 | HEART RATE: 76 BPM | SYSTOLIC BLOOD PRESSURE: 122 MMHG | WEIGHT: 160 LBS | DIASTOLIC BLOOD PRESSURE: 84 MMHG | HEIGHT: 63 IN

## 2019-04-01 DIAGNOSIS — J01.00 ACUTE MAXILLARY SINUSITIS, RECURRENCE NOT SPECIFIED: Primary | ICD-10-CM

## 2019-04-01 PROCEDURE — 3008F BODY MASS INDEX DOCD: CPT | Performed by: NURSE PRACTITIONER

## 2019-04-01 PROCEDURE — 99213 OFFICE O/P EST LOW 20 MIN: CPT | Performed by: NURSE PRACTITIONER

## 2019-04-01 RX ORDER — AMOXICILLIN 500 MG/1
500 CAPSULE ORAL EVERY 8 HOURS SCHEDULED
Qty: 30 CAPSULE | Refills: 0 | Status: SHIPPED | OUTPATIENT
Start: 2019-04-01 | End: 2019-04-11

## 2019-04-08 ENCOUNTER — POSTPARTUM VISIT (OUTPATIENT)
Dept: OBGYN CLINIC | Facility: MEDICAL CENTER | Age: 29
End: 2019-04-08

## 2019-04-08 VITALS — BODY MASS INDEX: 27.58 KG/M2 | SYSTOLIC BLOOD PRESSURE: 116 MMHG | WEIGHT: 155.7 LBS | DIASTOLIC BLOOD PRESSURE: 72 MMHG

## 2019-04-08 DIAGNOSIS — N93.9 ABNORMAL UTERINE BLEEDING (AUB): ICD-10-CM

## 2019-04-08 DIAGNOSIS — E61.1 IRON DEFICIENCY: ICD-10-CM

## 2019-04-08 PROCEDURE — 99024 POSTOP FOLLOW-UP VISIT: CPT | Performed by: OBSTETRICS & GYNECOLOGY

## 2019-04-10 PROBLEM — G43.909 MIGRAINES: Status: RESOLVED | Noted: 2019-02-24 | Resolved: 2019-04-10

## 2019-04-10 PROBLEM — Z34.93 THIRD TRIMESTER PREGNANCY: Status: RESOLVED | Noted: 2019-01-03 | Resolved: 2019-04-10

## 2019-04-10 PROBLEM — F41.9 ANXIETY: Status: RESOLVED | Noted: 2018-01-24 | Resolved: 2019-04-10

## 2019-04-10 PROBLEM — Z3A.39 39 WEEKS GESTATION OF PREGNANCY: Status: RESOLVED | Noted: 2018-09-21 | Resolved: 2019-04-10

## 2019-04-10 PROBLEM — IMO0002 RETAINED PLACENTA OR MEMBRANES WITHOUT HEMORRHAGE: Status: RESOLVED | Noted: 2019-02-22 | Resolved: 2019-04-10

## 2019-04-15 ENCOUNTER — OFFICE VISIT (OUTPATIENT)
Dept: BEHAVIORAL/MENTAL HEALTH CLINIC | Facility: CLINIC | Age: 29
End: 2019-04-15
Payer: COMMERCIAL

## 2019-04-15 DIAGNOSIS — F41.8 POSTPARTUM ANXIETY: Primary | ICD-10-CM

## 2019-04-15 PROCEDURE — 90834 PSYTX W PT 45 MINUTES: CPT | Performed by: SOCIAL WORKER

## 2019-04-17 ENCOUNTER — HOSPITAL ENCOUNTER (OUTPATIENT)
Dept: ULTRASOUND IMAGING | Facility: HOSPITAL | Age: 29
Discharge: HOME | End: 2019-04-17
Attending: OBSTETRICS & GYNECOLOGY
Payer: COMMERCIAL

## 2019-04-17 DIAGNOSIS — N93.9 ABNORMAL UTERINE BLEEDING (AUB): ICD-10-CM

## 2019-04-17 PROCEDURE — 76830 TRANSVAGINAL US NON-OB: CPT

## 2019-04-17 PROCEDURE — 76856 US EXAM PELVIC COMPLETE: CPT

## 2019-04-18 ENCOUNTER — OFFICE VISIT (OUTPATIENT)
Dept: OBGYN CLINIC | Facility: MEDICAL CENTER | Age: 29
End: 2019-04-18
Payer: COMMERCIAL

## 2019-04-18 VITALS — SYSTOLIC BLOOD PRESSURE: 114 MMHG | BODY MASS INDEX: 27.71 KG/M2 | WEIGHT: 156.4 LBS | DIASTOLIC BLOOD PRESSURE: 78 MMHG

## 2019-04-18 DIAGNOSIS — F41.9 ANXIETY: ICD-10-CM

## 2019-04-18 DIAGNOSIS — O99.340 DEPRESSION AFFECTING PREGNANCY: ICD-10-CM

## 2019-04-18 DIAGNOSIS — F32.A DEPRESSION AFFECTING PREGNANCY: ICD-10-CM

## 2019-04-18 LAB
BASOPHILS # BLD AUTO: 76 CELLS/UL (ref 0–200)
BASOPHILS NFR BLD AUTO: 0.8 %
EOSINOPHIL # BLD AUTO: 162 CELLS/UL (ref 15–500)
EOSINOPHIL NFR BLD AUTO: 1.7 %
ERYTHROCYTE [DISTWIDTH] IN BLOOD BY AUTOMATED COUNT: 11.9 % (ref 11–15)
HCT VFR BLD AUTO: 38 % (ref 35–45)
HGB BLD-MCNC: 12 G/DL (ref 11.7–15.5)
LYMPHOCYTES # BLD AUTO: 3154 CELLS/UL (ref 850–3900)
LYMPHOCYTES NFR BLD AUTO: 33.2 %
MCH RBC QN AUTO: 27 PG (ref 27–33)
MCHC RBC AUTO-ENTMCNC: 31.6 G/DL (ref 32–36)
MCV RBC AUTO: 85.4 FL (ref 80–100)
MONOCYTES # BLD AUTO: 551 CELLS/UL (ref 200–950)
MONOCYTES NFR BLD AUTO: 5.8 %
NEUTROPHILS # BLD AUTO: 5558 CELLS/UL (ref 1500–7800)
NEUTROPHILS NFR BLD AUTO: 58.5 %
PLATELET # BLD AUTO: 315 THOUSAND/UL (ref 140–400)
PMV BLD REES-ECKER: 10.8 FL (ref 7.5–12.5)
RBC # BLD AUTO: 4.45 MILLION/UL (ref 3.8–5.1)
WBC # BLD AUTO: 9.5 THOUSAND/UL (ref 3.8–10.8)

## 2019-04-18 PROCEDURE — 99214 OFFICE O/P EST MOD 30 MIN: CPT | Performed by: OBSTETRICS & GYNECOLOGY

## 2019-04-18 RX ORDER — LORAZEPAM 0.5 MG/1
0.5 TABLET ORAL EVERY 8 HOURS PRN
Qty: 30 TABLET | Refills: 0 | Status: SHIPPED | OUTPATIENT
Start: 2019-04-18 | End: 2020-07-08 | Stop reason: ALTCHOICE

## 2019-04-19 LAB — B-HCG SERPL-ACNC: 5 MIU/ML

## 2019-04-22 RX ORDER — ACETAMINOPHEN 500 MG
500 TABLET ORAL EVERY 6 HOURS PRN
COMMUNITY

## 2019-04-22 RX ORDER — IBUPROFEN 600 MG/1
TABLET ORAL EVERY 6 HOURS PRN
COMMUNITY
End: 2020-05-12

## 2019-04-23 ENCOUNTER — TELEPHONE (OUTPATIENT)
Dept: OBGYN CLINIC | Facility: MEDICAL CENTER | Age: 29
End: 2019-04-23

## 2019-04-25 ENCOUNTER — HOSPITAL ENCOUNTER (OUTPATIENT)
Dept: MRI IMAGING | Facility: HOSPITAL | Age: 29
Discharge: HOME | End: 2019-04-25
Attending: OBSTETRICS & GYNECOLOGY
Payer: COMMERCIAL

## 2019-04-25 PROCEDURE — A9585 GADOBUTROL INJECTION: HCPCS | Performed by: OBSTETRICS & GYNECOLOGY

## 2019-04-25 PROCEDURE — 72197 MRI PELVIS W/O & W/DYE: CPT

## 2019-04-25 RX ADMIN — GADOBUTROL 7 ML: 604.72 INJECTION INTRAVENOUS at 20:36

## 2019-04-26 ENCOUNTER — TELEPHONE (OUTPATIENT)
Dept: LABOR AND DELIVERY | Facility: HOSPITAL | Age: 29
End: 2019-04-26

## 2019-04-26 ENCOUNTER — ANESTHESIA EVENT (OUTPATIENT)
Dept: PERIOP | Facility: HOSPITAL | Age: 29
End: 2019-04-26
Payer: COMMERCIAL

## 2019-04-29 ENCOUNTER — ANESTHESIA (OUTPATIENT)
Dept: PERIOP | Facility: HOSPITAL | Age: 29
End: 2019-04-29
Payer: COMMERCIAL

## 2019-04-29 ENCOUNTER — HOSPITAL ENCOUNTER (OUTPATIENT)
Facility: HOSPITAL | Age: 29
Setting detail: OUTPATIENT SURGERY
Discharge: HOME | End: 2019-04-29
Attending: OBSTETRICS & GYNECOLOGY | Admitting: OBSTETRICS & GYNECOLOGY
Payer: COMMERCIAL

## 2019-04-29 VITALS
BODY MASS INDEX: 26.58 KG/M2 | WEIGHT: 150 LBS | HEART RATE: 65 BPM | HEIGHT: 63 IN | DIASTOLIC BLOOD PRESSURE: 76 MMHG | TEMPERATURE: 98.2 F | OXYGEN SATURATION: 99 % | SYSTOLIC BLOOD PRESSURE: 117 MMHG | RESPIRATION RATE: 16 BRPM

## 2019-04-29 PROBLEM — Z98.890 S/P D&C (STATUS POST DILATION AND CURETTAGE): Status: ACTIVE | Noted: 2019-04-29

## 2019-04-29 PROBLEM — Z87.59 HISTORY OF PLACENTA ACCRETA: Status: ACTIVE | Noted: 2019-04-29

## 2019-04-29 LAB
ABO GROUP BLD: NORMAL
B-HCG SERPL-ACNC: <2 MIU/ML
BLD GP AB SCN SERPL QL: NEGATIVE
ERYTHROCYTE [DISTWIDTH] IN BLOOD BY AUTOMATED COUNT: 12.8 % (ref 11.6–15.1)
EXT PREGNANCY TEST URINE: NEGATIVE
HCT VFR BLD AUTO: 40.3 % (ref 34.8–46.1)
HGB BLD-MCNC: 12.4 G/DL (ref 11.5–15.4)
MCH RBC QN AUTO: 26.9 PG (ref 26.8–34.3)
MCHC RBC AUTO-ENTMCNC: 30.8 G/DL (ref 31.4–37.4)
MCV RBC AUTO: 87 FL (ref 82–98)
PLATELET # BLD AUTO: 300 THOUSANDS/UL (ref 149–390)
PMV BLD AUTO: 9.7 FL (ref 8.9–12.7)
RBC # BLD AUTO: 4.61 MILLION/UL (ref 3.81–5.12)
RH BLD: POSITIVE
SPECIMEN EXPIRATION DATE: NORMAL
WBC # BLD AUTO: 6.08 THOUSAND/UL (ref 4.31–10.16)

## 2019-04-29 PROCEDURE — 88307 TISSUE EXAM BY PATHOLOGIST: CPT | Performed by: PATHOLOGY

## 2019-04-29 PROCEDURE — 81025 URINE PREGNANCY TEST: CPT | Performed by: ANESTHESIOLOGY

## 2019-04-29 PROCEDURE — 84702 CHORIONIC GONADOTROPIN TEST: CPT | Performed by: OBSTETRICS & GYNECOLOGY

## 2019-04-29 PROCEDURE — 86850 RBC ANTIBODY SCREEN: CPT | Performed by: OBSTETRICS & GYNECOLOGY

## 2019-04-29 PROCEDURE — 86901 BLOOD TYPING SEROLOGIC RH(D): CPT | Performed by: OBSTETRICS & GYNECOLOGY

## 2019-04-29 PROCEDURE — 85027 COMPLETE CBC AUTOMATED: CPT | Performed by: OBSTETRICS & GYNECOLOGY

## 2019-04-29 PROCEDURE — 86900 BLOOD TYPING SEROLOGIC ABO: CPT | Performed by: OBSTETRICS & GYNECOLOGY

## 2019-04-29 PROCEDURE — 58558 HYSTEROSCOPY BIOPSY: CPT | Performed by: OBSTETRICS & GYNECOLOGY

## 2019-04-29 RX ORDER — FENTANYL CITRATE/PF 50 MCG/ML
25 SYRINGE (ML) INJECTION
Status: DISCONTINUED | OUTPATIENT
Start: 2019-04-29 | End: 2019-04-29 | Stop reason: HOSPADM

## 2019-04-29 RX ORDER — PROPOFOL 10 MG/ML
INJECTION, EMULSION INTRAVENOUS AS NEEDED
Status: DISCONTINUED | OUTPATIENT
Start: 2019-04-29 | End: 2019-04-29 | Stop reason: SURG

## 2019-04-29 RX ORDER — CEFAZOLIN SODIUM 1 G/3ML
INJECTION, POWDER, FOR SOLUTION INTRAMUSCULAR; INTRAVENOUS AS NEEDED
Status: DISCONTINUED | OUTPATIENT
Start: 2019-04-29 | End: 2019-04-29 | Stop reason: SURG

## 2019-04-29 RX ORDER — FENTANYL CITRATE 50 UG/ML
INJECTION, SOLUTION INTRAMUSCULAR; INTRAVENOUS AS NEEDED
Status: DISCONTINUED | OUTPATIENT
Start: 2019-04-29 | End: 2019-04-29 | Stop reason: SURG

## 2019-04-29 RX ORDER — MIDAZOLAM HYDROCHLORIDE 1 MG/ML
INJECTION INTRAMUSCULAR; INTRAVENOUS AS NEEDED
Status: DISCONTINUED | OUTPATIENT
Start: 2019-04-29 | End: 2019-04-29 | Stop reason: SURG

## 2019-04-29 RX ORDER — EPHEDRINE SULFATE 50 MG/ML
INJECTION INTRAVENOUS AS NEEDED
Status: DISCONTINUED | OUTPATIENT
Start: 2019-04-29 | End: 2019-04-29 | Stop reason: SURG

## 2019-04-29 RX ORDER — SODIUM CHLORIDE 9 MG/ML
125 INJECTION, SOLUTION INTRAVENOUS CONTINUOUS
Status: DISCONTINUED | OUTPATIENT
Start: 2019-04-29 | End: 2019-04-29

## 2019-04-29 RX ORDER — ONDANSETRON 2 MG/ML
4 INJECTION INTRAMUSCULAR; INTRAVENOUS EVERY 6 HOURS PRN
Status: DISCONTINUED | OUTPATIENT
Start: 2019-04-29 | End: 2019-04-29 | Stop reason: HOSPADM

## 2019-04-29 RX ORDER — ONDANSETRON 2 MG/ML
INJECTION INTRAMUSCULAR; INTRAVENOUS AS NEEDED
Status: DISCONTINUED | OUTPATIENT
Start: 2019-04-29 | End: 2019-04-29 | Stop reason: SURG

## 2019-04-29 RX ORDER — NEOSTIGMINE METHYLSULFATE 1 MG/ML
INJECTION INTRAVENOUS AS NEEDED
Status: DISCONTINUED | OUTPATIENT
Start: 2019-04-29 | End: 2019-04-29 | Stop reason: SURG

## 2019-04-29 RX ORDER — OXYCODONE HYDROCHLORIDE AND ACETAMINOPHEN 5; 325 MG/1; MG/1
1 TABLET ORAL ONCE AS NEEDED
Status: DISCONTINUED | OUTPATIENT
Start: 2019-04-29 | End: 2019-04-29 | Stop reason: HOSPADM

## 2019-04-29 RX ORDER — ACETAMINOPHEN 325 MG/1
650 TABLET ORAL EVERY 6 HOURS PRN
Status: DISCONTINUED | OUTPATIENT
Start: 2019-04-29 | End: 2019-04-29 | Stop reason: HOSPADM

## 2019-04-29 RX ORDER — FERRIC SUBSULFATE 0.21 G/G
LIQUID TOPICAL AS NEEDED
Status: DISCONTINUED | OUTPATIENT
Start: 2019-04-29 | End: 2019-04-29 | Stop reason: HOSPADM

## 2019-04-29 RX ORDER — DEXAMETHASONE SODIUM PHOSPHATE 10 MG/ML
INJECTION, SOLUTION INTRAMUSCULAR; INTRAVENOUS AS NEEDED
Status: DISCONTINUED | OUTPATIENT
Start: 2019-04-29 | End: 2019-04-29 | Stop reason: SURG

## 2019-04-29 RX ORDER — ONDANSETRON 2 MG/ML
4 INJECTION INTRAMUSCULAR; INTRAVENOUS ONCE AS NEEDED
Status: DISCONTINUED | OUTPATIENT
Start: 2019-04-29 | End: 2019-04-29 | Stop reason: HOSPADM

## 2019-04-29 RX ORDER — ACETAMINOPHEN AND CODEINE PHOSPHATE 120; 12 MG/5ML; MG/5ML
1 SOLUTION ORAL DAILY
Qty: 28 TABLET | Refills: 3 | Status: SHIPPED | OUTPATIENT
Start: 2019-04-29 | End: 2019-08-15 | Stop reason: SDUPTHER

## 2019-04-29 RX ORDER — KETOROLAC TROMETHAMINE 30 MG/ML
INJECTION, SOLUTION INTRAMUSCULAR; INTRAVENOUS AS NEEDED
Status: DISCONTINUED | OUTPATIENT
Start: 2019-04-29 | End: 2019-04-29 | Stop reason: SURG

## 2019-04-29 RX ORDER — ROCURONIUM BROMIDE 10 MG/ML
INJECTION, SOLUTION INTRAVENOUS AS NEEDED
Status: DISCONTINUED | OUTPATIENT
Start: 2019-04-29 | End: 2019-04-29 | Stop reason: SURG

## 2019-04-29 RX ORDER — GLYCOPYRROLATE 0.2 MG/ML
INJECTION INTRAMUSCULAR; INTRAVENOUS AS NEEDED
Status: DISCONTINUED | OUTPATIENT
Start: 2019-04-29 | End: 2019-04-29 | Stop reason: SURG

## 2019-04-29 RX ORDER — IBUPROFEN 600 MG/1
600 TABLET ORAL EVERY 6 HOURS PRN
Status: DISCONTINUED | OUTPATIENT
Start: 2019-04-29 | End: 2019-04-29 | Stop reason: HOSPADM

## 2019-04-29 RX ORDER — MAGNESIUM HYDROXIDE 1200 MG/15ML
LIQUID ORAL AS NEEDED
Status: DISCONTINUED | OUTPATIENT
Start: 2019-04-29 | End: 2019-04-29 | Stop reason: HOSPADM

## 2019-04-29 RX ADMIN — PROPOFOL 200 MG: 10 INJECTION, EMULSION INTRAVENOUS at 12:29

## 2019-04-29 RX ADMIN — CEFAZOLIN SODIUM 2000 MG: 1 INJECTION, POWDER, FOR SOLUTION INTRAMUSCULAR; INTRAVENOUS at 12:40

## 2019-04-29 RX ADMIN — DEXAMETHASONE SODIUM PHOSPHATE 4 MG: 10 INJECTION, SOLUTION INTRAMUSCULAR; INTRAVENOUS at 13:37

## 2019-04-29 RX ADMIN — NEOSTIGMINE METHYLSULFATE 3 MG: 1 INJECTION, SOLUTION INTRAVENOUS at 13:40

## 2019-04-29 RX ADMIN — GLYCOPYRROLATE 0.2 MG: 0.2 INJECTION INTRAMUSCULAR; INTRAVENOUS at 12:51

## 2019-04-29 RX ADMIN — KETOROLAC TROMETHAMINE 30 MG: 30 INJECTION, SOLUTION INTRAMUSCULAR at 13:38

## 2019-04-29 RX ADMIN — FENTANYL CITRATE 50 MCG: 50 INJECTION, SOLUTION INTRAMUSCULAR; INTRAVENOUS at 12:28

## 2019-04-29 RX ADMIN — MIDAZOLAM 2 MG: 1 INJECTION INTRAMUSCULAR; INTRAVENOUS at 12:21

## 2019-04-29 RX ADMIN — EPHEDRINE SULFATE 5 MG: 50 INJECTION, SOLUTION INTRAVENOUS at 12:54

## 2019-04-29 RX ADMIN — ROCURONIUM BROMIDE 30 MG: 10 INJECTION, SOLUTION INTRAVENOUS at 12:29

## 2019-04-29 RX ADMIN — GLYCOPYRROLATE 0.4 MG: 0.2 INJECTION INTRAMUSCULAR; INTRAVENOUS at 13:40

## 2019-04-29 RX ADMIN — FENTANYL CITRATE 50 MCG: 50 INJECTION, SOLUTION INTRAMUSCULAR; INTRAVENOUS at 13:46

## 2019-04-29 RX ADMIN — SODIUM CHLORIDE 125 ML/HR: 0.9 INJECTION, SOLUTION INTRAVENOUS at 14:36

## 2019-04-29 RX ADMIN — ONDANSETRON HYDROCHLORIDE 4 MG: 2 INJECTION, SOLUTION INTRAVENOUS at 13:37

## 2019-04-29 RX ADMIN — SODIUM CHLORIDE 125 ML/HR: 0.9 INJECTION, SOLUTION INTRAVENOUS at 11:30

## 2019-04-29 RX ADMIN — SODIUM CHLORIDE 125 ML/HR: 0.9 INJECTION, SOLUTION INTRAVENOUS at 10:17

## 2019-04-30 ENCOUNTER — TELEPHONE (OUTPATIENT)
Dept: OBGYN CLINIC | Facility: MEDICAL CENTER | Age: 29
End: 2019-04-30

## 2019-05-06 ENCOUNTER — TELEPHONE (OUTPATIENT)
Dept: OBGYN CLINIC | Facility: CLINIC | Age: 29
End: 2019-05-06

## 2019-05-16 ENCOUNTER — OFFICE VISIT (OUTPATIENT)
Dept: OBGYN CLINIC | Facility: MEDICAL CENTER | Age: 29
End: 2019-05-16

## 2019-05-16 VITALS — WEIGHT: 155.5 LBS | BODY MASS INDEX: 27.55 KG/M2 | DIASTOLIC BLOOD PRESSURE: 78 MMHG | SYSTOLIC BLOOD PRESSURE: 120 MMHG

## 2019-05-16 DIAGNOSIS — Z98.890 POST-OPERATIVE STATE: Primary | ICD-10-CM

## 2019-05-16 PROCEDURE — 99024 POSTOP FOLLOW-UP VISIT: CPT | Performed by: OBSTETRICS & GYNECOLOGY

## 2019-06-27 ENCOUNTER — HOSPITAL ENCOUNTER (OUTPATIENT)
Dept: ULTRASOUND IMAGING | Facility: HOSPITAL | Age: 29
Discharge: HOME | End: 2019-06-27
Attending: OBSTETRICS & GYNECOLOGY
Payer: COMMERCIAL

## 2019-06-27 PROCEDURE — 76856 US EXAM PELVIC COMPLETE: CPT

## 2019-06-27 PROCEDURE — 76830 TRANSVAGINAL US NON-OB: CPT

## 2019-08-15 RX ORDER — NORETHINDRONE 0.35 MG/1
TABLET ORAL
Qty: 28 TABLET | Refills: 3 | Status: SHIPPED | OUTPATIENT
Start: 2019-08-15 | End: 2020-04-16

## 2019-10-04 DIAGNOSIS — F32.A DEPRESSION AFFECTING PREGNANCY: ICD-10-CM

## 2019-10-04 DIAGNOSIS — O99.340 DEPRESSION AFFECTING PREGNANCY: ICD-10-CM

## 2019-10-28 ENCOUNTER — TELEPHONE (OUTPATIENT)
Dept: FAMILY MEDICINE CLINIC | Facility: CLINIC | Age: 29
End: 2019-10-28

## 2019-10-28 DIAGNOSIS — B37.3 VAGINAL YEAST INFECTION: Primary | ICD-10-CM

## 2019-10-28 RX ORDER — FLUCONAZOLE 150 MG/1
150 TABLET ORAL ONCE
Qty: 1 TABLET | Refills: 1 | Status: SHIPPED | OUTPATIENT
Start: 2019-10-28 | End: 2019-10-28

## 2019-10-28 NOTE — TELEPHONE ENCOUNTER
Spoke with patient will speak to TW and see if he will do a Diflucan tab for patient  Would like sent to [facility]

## 2019-11-02 ENCOUNTER — OFFICE VISIT (OUTPATIENT)
Dept: FAMILY MEDICINE CLINIC | Facility: CLINIC | Age: 29
End: 2019-11-02
Payer: COMMERCIAL

## 2019-11-02 VITALS
BODY MASS INDEX: 27.29 KG/M2 | SYSTOLIC BLOOD PRESSURE: 122 MMHG | WEIGHT: 154 LBS | OXYGEN SATURATION: 98 % | DIASTOLIC BLOOD PRESSURE: 84 MMHG | HEIGHT: 63 IN | HEART RATE: 85 BPM

## 2019-11-02 DIAGNOSIS — T65.91XA ALLERGIC REACTION TO CHEMICAL SUBSTANCE, ACCIDENTAL OR UNINTENTIONAL, INITIAL ENCOUNTER: Primary | ICD-10-CM

## 2019-11-02 PROCEDURE — 3008F BODY MASS INDEX DOCD: CPT | Performed by: FAMILY MEDICINE

## 2019-11-02 PROCEDURE — 99213 OFFICE O/P EST LOW 20 MIN: CPT | Performed by: FAMILY MEDICINE

## 2019-11-02 RX ORDER — PREDNISONE 20 MG/1
TABLET ORAL
Qty: 15 TABLET | Refills: 0 | Status: SHIPPED | OUTPATIENT
Start: 2019-11-02 | End: 2020-01-14 | Stop reason: ALTCHOICE
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
## 2019-11-02 NOTE — PATIENT INSTRUCTIONS
Start prednisone 20 mg twice daily  If no response by tomorrow you could take 3 daily for 2 days then go back to 2 for 2 days and 1 daily for 3 days  You can also still use the Benadryl    Topical Cordaid to skin for itching

## 2019-11-11 ENCOUNTER — TELEPHONE (OUTPATIENT)
Dept: FAMILY MEDICINE CLINIC | Facility: CLINIC | Age: 29
End: 2019-11-11

## 2019-11-11 NOTE — TELEPHONE ENCOUNTER
Patient was on prednisone 20 mg given on 11/02/2019  prednisone done but it look that the rash is not getting any better patient believe is now internal  For any little thing she does it comes right back, shower , sleeping, breast feeding , clothing anything the touches her body it gets irritated  Prednisone did upset her stomach but finish   Please review

## 2019-11-11 NOTE — TELEPHONE ENCOUNTER
Voicemail left to call office and ask for day  Dr Molina Correia want patient to do some allegra  Maybe allergy but since last day of prednisone we should wait a couple day to see if it goes away if not call office  is the rash on scalp gone?

## 2019-12-09 ENCOUNTER — TELEPHONE (OUTPATIENT)
Dept: FAMILY MEDICINE CLINIC | Facility: CLINIC | Age: 29
End: 2019-12-09

## 2019-12-09 NOTE — TELEPHONE ENCOUNTER
MEMBERS PLAN DOES NOT REQUIRE REFERRAL           Review Identification Number:  -- Review Decision:  No Action Required   BETH Trace Number:  [id number] Reason Code:  --     NaviNet Status:        Member ID:  [id number] Member Name:  Sara Ansari   Member :  1990

## 2019-12-09 NOTE — TELEPHONE ENCOUNTER
Referral needed for [facility] Neurology Dr Kaur Fagan   NPI- [id number]  DX- migraines  FAX- [phone]

## 2019-12-11 ENCOUNTER — HOSPITAL ENCOUNTER (EMERGENCY)
Facility: HOSPITAL | Age: 29
Discharge: HOME | End: 2019-12-11
Attending: EMERGENCY MEDICINE | Admitting: EMERGENCY MEDICINE
Payer: COMMERCIAL

## 2019-12-11 VITALS
HEIGHT: 63 IN | HEART RATE: 65 BPM | DIASTOLIC BLOOD PRESSURE: 58 MMHG | SYSTOLIC BLOOD PRESSURE: 114 MMHG | WEIGHT: 157.85 LBS | RESPIRATION RATE: 14 BRPM | BODY MASS INDEX: 27.97 KG/M2 | TEMPERATURE: 97.6 F | OXYGEN SATURATION: 97 %

## 2019-12-11 DIAGNOSIS — G43.111 INTRACTABLE MIGRAINE WITH AURA WITH STATUS MIGRAINOSUS: Primary | ICD-10-CM

## 2019-12-11 DIAGNOSIS — Z3A.35 35 WEEKS GESTATION OF PREGNANCY: ICD-10-CM

## 2019-12-11 LAB
CLARITY, POC: CLEAR
COLOR, POC: YELLOW
EXT BILIRUBIN, UA: NEGATIVE
EXT BLOOD URINE: NEGATIVE
EXT GLUCOSE, UA: NEGATIVE
EXT KETONES: NEGATIVE
EXT NITRITE, UA: NEGATIVE
EXT PH, UA: 6.5
EXT PREG TEST URINE: NEGATIVE
EXT PROTEIN, UA: NEGATIVE
EXT SPECIFIC GRAVITY, UA: 1.01
EXT UROBILINOGEN: 0.2
EXT. CONTROL ED NAV: NORMAL
WBC # BLD EST: NEGATIVE 10*3/UL

## 2019-12-11 PROCEDURE — 99284 EMERGENCY DEPT VISIT MOD MDM: CPT

## 2019-12-11 PROCEDURE — 81025 URINE PREGNANCY TEST: CPT | Performed by: PHYSICIAN ASSISTANT

## 2019-12-11 PROCEDURE — 96365 THER/PROPH/DIAG IV INF INIT: CPT

## 2019-12-11 PROCEDURE — 96375 TX/PRO/DX INJ NEW DRUG ADDON: CPT

## 2019-12-11 PROCEDURE — 99284 EMERGENCY DEPT VISIT MOD MDM: CPT | Performed by: PHYSICIAN ASSISTANT

## 2019-12-11 RX ORDER — ACETAMINOPHEN 325 MG/1
650 TABLET ORAL ONCE
Status: DISCONTINUED | OUTPATIENT
Start: 2019-12-11 | End: 2019-12-11

## 2019-12-11 RX ORDER — METOCLOPRAMIDE HYDROCHLORIDE 5 MG/ML
10 INJECTION INTRAMUSCULAR; INTRAVENOUS ONCE
Status: COMPLETED | OUTPATIENT
Start: 2019-12-11 | End: 2019-12-11

## 2019-12-11 RX ORDER — MAGNESIUM SULFATE HEPTAHYDRATE 40 MG/ML
2 INJECTION, SOLUTION INTRAVENOUS ONCE
Status: COMPLETED | OUTPATIENT
Start: 2019-12-11 | End: 2019-12-11

## 2019-12-11 RX ORDER — DIPHENHYDRAMINE HYDROCHLORIDE 50 MG/ML
25 INJECTION INTRAMUSCULAR; INTRAVENOUS ONCE
Status: COMPLETED | OUTPATIENT
Start: 2019-12-11 | End: 2019-12-11

## 2019-12-11 RX ORDER — BUTALBITAL, ACETAMINOPHEN AND CAFFEINE 50; 325; 40 MG/1; MG/1; MG/1
1 TABLET ORAL EVERY 6 HOURS PRN
Qty: 10 TABLET | Refills: 0 | Status: ON HOLD | OUTPATIENT
Start: 2019-12-11 | End: 2020-11-01 | Stop reason: ALTCHOICE

## 2019-12-11 RX ORDER — DEXAMETHASONE SODIUM PHOSPHATE 10 MG/ML
10 INJECTION, SOLUTION INTRAMUSCULAR; INTRAVENOUS ONCE
Status: COMPLETED | OUTPATIENT
Start: 2019-12-11 | End: 2019-12-11

## 2019-12-11 RX ORDER — KETOROLAC TROMETHAMINE 30 MG/ML
15 INJECTION, SOLUTION INTRAMUSCULAR; INTRAVENOUS ONCE
Status: COMPLETED | OUTPATIENT
Start: 2019-12-11 | End: 2019-12-11

## 2019-12-11 RX ADMIN — MAGNESIUM SULFATE HEPTAHYDRATE 2 G: 40 INJECTION, SOLUTION INTRAVENOUS at 15:12

## 2019-12-11 RX ADMIN — SODIUM CHLORIDE 1000 ML: 0.9 INJECTION, SOLUTION INTRAVENOUS at 15:10

## 2019-12-11 RX ADMIN — KETOROLAC TROMETHAMINE 15 MG: 30 INJECTION, SOLUTION INTRAMUSCULAR; INTRAVENOUS at 15:12

## 2019-12-11 RX ADMIN — DIPHENHYDRAMINE HYDROCHLORIDE 25 MG: 50 INJECTION, SOLUTION INTRAMUSCULAR; INTRAVENOUS at 15:12

## 2019-12-11 RX ADMIN — DEXAMETHASONE SODIUM PHOSPHATE 10 MG: 10 INJECTION, SOLUTION INTRAMUSCULAR; INTRAVENOUS at 15:11

## 2019-12-11 RX ADMIN — METOCLOPRAMIDE 10 MG: 5 INJECTION, SOLUTION INTRAMUSCULAR; INTRAVENOUS at 15:12

## 2019-12-11 NOTE — ED NOTES
Patient rang call bell, states she is feeling much better and would like to go home and rest  PA Alonso notified      Radhika Singh, MACKENZIE  12/11/19 5470
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
## 2019-12-16 ENCOUNTER — VBI (OUTPATIENT)
Dept: FAMILY MEDICINE CLINIC | Facility: CLINIC | Age: 29
End: 2019-12-16

## 2019-12-16 NOTE — LETTER
[address]  [address] [facility] [address]  [facility] [address]    Date: 12/17/19  [place] [id number]    Dear Erich Aquino: Thank you for choosing [facility] emergency department for care  As your primary care provider we want to make sure that your ongoing medical care is being addressed  If you require follow up care as a result of your emergency department visit, there are a few things we would like you to know  As part of our continuing commitment to caring for our patient, we have added more same day appointments and have extended our office hours to meet your medical needs  After hours, on-call physicians are available via our main office line  We encourage you to contact our office prior to seeking treatment to discuss your symptoms with our medical staff  Together, we can determine the correct course of action  A majority of non-emergent conditions such as: common cold, flu-like symptoms, fevers, strains/sprains, dislocations, minor burns, cuts and animal bites can be treated at [address] [place] [address] facilities  Diagnostic testing is available at some sites  Of course, if you are experiencing a life threatening medical emergency call 911 or proceed directly to the nearest emergency room  Your nearest [address] [place] [address] facility is conveniently located at:    [address] [place] [address]  [address] [place] [address], [address] [place] [address] Road  [phone]      SKIP THE WAIT  Conveniently offered at most [facility] locations  [place] your spot online at www [facility] org/care-now/locations or on the [facility] 49    Sincerely,    [address]  Dept: [phone]

## 2019-12-16 NOTE — TELEPHONE ENCOUNTER
Molly Melissa    ED Visit Information     Ed visit date: 12/11/2019  Diagnosis Description: Contusion of hand  In Network? Yes [address]  Discharge status: Home  Discharged with meds ? No  Number of ED visits to date: 1  ED Severity:N/a     Outreach Information    Outreach successful: Yes 2  Date letter mailed:N/a  Date Finalized:12/17/2019    Care Coordination    Follow up appointment with pcp: no No ED f/u appt scheduled  Transportation issues ? No    Value [employer] type:  7 Day Outreach  Emergent necessity warranted by diagnosis:  No  [facility] PCP:  Yes  Transportation:  Friend/Family Transport  Called PCP first?:  No  Feels able to call PCP for urgent problems ?:  Yes  Understands what emergencies can be handled by PCP ?:  Yes  Ever any problems getting appointment with PCP for minor emergency/urgency problems?:  No  Practice Contacted Patient ?:  No  Pt had ED follow up with pcp/staff ?:  No    Seen for follow-up out of network ?:  No  Reason Patient went to ED instead of Urgent Care or PCP?:  Perceived Severity of Illness  Urgent care Education?:  No  12/16/2019 01:47 PM Phone (indoo[employer]rs) Kira Moody (Self) [phone] (H)   Not Available - Unable to leave a message due to mail box not being set up  Unable to reach patient regarding recent ED visit on 12/11 for Intractable migraine with aura with status migrainosus  Patient was discharged without medication and advised to follow up with PCP  2nd attempt will be made on 12/17/2019 12/17/2019 10:26 AM Phone ([employer]oo[employer]rs) Kira Moody (Self) [phone] (H)   Left Message  Unable to reach patient regarding recent ED visit on 12/11 for Intractable migraine with aura with status migrainosus  Patient was discharged without medication and advised to follow up with PCP  Letter generated and mailed       12/17/2019 12:15 PM Phone (Incoming) Justin Paz (Self) [phone] (H)   Rtn Call (Letter not mailed)  Personal communication with patient regarding recent ED visit on 12/11 for Intractable migraine with aura with status migrainosus  Patient was discharged without medication and advised to follow up with PCP  Patient stated that she is feeling better  She will be seeing a neurologist  She will call PCP for a referral   Patient declined follow up appt with PCP at this time  Patient does not meet OPCM criteria  Patient was not aware of PCP after hours on-call service, education given  Patient is aware of her nearest [place] [address] urgent care facility and what conditions may be treated there

## 2019-12-17 DIAGNOSIS — Z86.69 HX OF MIGRAINES: Primary | ICD-10-CM

## 2020-01-02 ENCOUNTER — TELEPHONE (OUTPATIENT)
Dept: NEUROLOGY | Facility: CLINIC | Age: 30
End: 2020-01-02
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
## 2020-01-10 ENCOUNTER — OFFICE VISIT (OUTPATIENT)
Dept: FAMILY MEDICINE CLINIC | Facility: CLINIC | Age: 30
End: 2020-01-10
Payer: COMMERCIAL

## 2020-01-10 VITALS
DIASTOLIC BLOOD PRESSURE: 83 MMHG | WEIGHT: 158 LBS | OXYGEN SATURATION: 97 % | SYSTOLIC BLOOD PRESSURE: 131 MMHG | BODY MASS INDEX: 28 KG/M2 | HEART RATE: 64 BPM | TEMPERATURE: 97.5 F | HEIGHT: 63 IN

## 2020-01-10 DIAGNOSIS — J01.00 ACUTE NON-RECURRENT MAXILLARY SINUSITIS: Primary | ICD-10-CM

## 2020-01-10 PROCEDURE — 99213 OFFICE O/P EST LOW 20 MIN: CPT | Performed by: NURSE PRACTITIONER

## 2020-01-10 PROCEDURE — 3008F BODY MASS INDEX DOCD: CPT | Performed by: NURSE PRACTITIONER

## 2020-01-10 RX ORDER — ACYCLOVIR 400 MG/1
TABLET ORAL
COMMUNITY
Start: 2020-01-05 | End: 2020-03-09 | Stop reason: SDUPTHER

## 2020-01-10 NOTE — PATIENT INSTRUCTIONS
Discussed viral vs bacterial infection  Continue OTC cough/cold medications as directed  Call/return if symptoms do not improve or worsen  Call or return for problems/concerns
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
## 2020-01-13 ENCOUNTER — ANNUAL EXAM (OUTPATIENT)
Dept: OBGYN CLINIC | Facility: MEDICAL CENTER | Age: 30
End: 2020-01-13
Payer: COMMERCIAL

## 2020-01-13 VITALS
SYSTOLIC BLOOD PRESSURE: 118 MMHG | DIASTOLIC BLOOD PRESSURE: 76 MMHG | HEIGHT: 63 IN | BODY MASS INDEX: 28.77 KG/M2 | WEIGHT: 162.4 LBS

## 2020-01-13 DIAGNOSIS — Z87.59 HISTORY OF RETAINED PLACENTA: ICD-10-CM

## 2020-01-13 DIAGNOSIS — Z01.419 ENCOUNTER FOR ROUTINE GYNECOLOGICAL EXAMINATION WITH PAPANICOLAOU SMEAR OF CERVIX: Primary | ICD-10-CM

## 2020-01-13 PROCEDURE — 99395 PREV VISIT EST AGE 18-39: CPT | Performed by: OBSTETRICS & GYNECOLOGY
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
## 2020-01-14 ENCOUNTER — TELEPHONE (OUTPATIENT)
Dept: NEUROLOGY | Facility: CLINIC | Age: 30
End: 2020-01-14

## 2020-01-14 ENCOUNTER — CONSULT (OUTPATIENT)
Dept: NEUROLOGY | Facility: CLINIC | Age: 30
End: 2020-01-14
Payer: COMMERCIAL

## 2020-01-14 VITALS
HEART RATE: 78 BPM | WEIGHT: 157.7 LBS | RESPIRATION RATE: 16 BRPM | DIASTOLIC BLOOD PRESSURE: 78 MMHG | SYSTOLIC BLOOD PRESSURE: 112 MMHG | HEIGHT: 63 IN | BODY MASS INDEX: 27.94 KG/M2

## 2020-01-14 DIAGNOSIS — F44.7 FUNCTIONAL NEUROLOGICAL SYMPTOM DISORDER WITH MIXED SYMPTOMS: ICD-10-CM

## 2020-01-14 DIAGNOSIS — F43.22 PERSISTENT ADJUSTMENT DISORDER WITH ANXIETY: ICD-10-CM

## 2020-01-14 DIAGNOSIS — G43.009 MIGRAINE WITHOUT AURA AND WITHOUT STATUS MIGRAINOSUS, NOT INTRACTABLE: Primary | ICD-10-CM

## 2020-01-14 DIAGNOSIS — S06.9X0D MILD TRAUMATIC BRAIN INJURY, WITHOUT LOSS OF CONSCIOUSNESS, SUBSEQUENT ENCOUNTER: ICD-10-CM

## 2020-01-14 DIAGNOSIS — G44.329 CHRONIC POST-TRAUMATIC HEADACHE, NOT INTRACTABLE: ICD-10-CM

## 2020-01-14 DIAGNOSIS — E55.9 VITAMIN D DEFICIENCY: ICD-10-CM

## 2020-01-14 DIAGNOSIS — F43.10 PTSD (POST-TRAUMATIC STRESS DISORDER): ICD-10-CM

## 2020-01-14 LAB
CLINICAL INFO: NORMAL
CYTO CVX: NORMAL
CYTOLOGY CMNT CVX/VAG CYTO-IMP: NORMAL
DATE PREVIOUS BX: NORMAL
HPV E6+E7 MRNA CVX QL NAA+PROBE: NOT DETECTED
LMP START DATE: NORMAL
SL AMB PREV. PAP:: NORMAL
SPECIMEN SOURCE CVX/VAG CYTO: NORMAL

## 2020-01-14 PROCEDURE — 99205 OFFICE O/P NEW HI 60 MIN: CPT | Performed by: PSYCHIATRY & NEUROLOGY

## 2020-01-14 RX ORDER — METOCLOPRAMIDE 10 MG/1
10 TABLET ORAL 4 TIMES DAILY
Qty: 12 TABLET | Refills: 6 | Status: SHIPPED | OUTPATIENT
Start: 2020-01-14 | End: 2020-07-08 | Stop reason: ALTCHOICE

## 2020-01-14 RX ORDER — NAPHAZOLINE HCL 0.012 %
1 DROPS OPHTHALMIC (EYE) EVERY 6 HOURS PRN
COMMUNITY
End: 2020-11-03 | Stop reason: HOSPADM

## 2020-01-14 RX ORDER — BUTALBITAL, ACETAMINOPHEN AND CAFFEINE 50; 325; 40 MG/1; MG/1; MG/1
TABLET ORAL
Qty: 4 TABLET | Refills: 0 | Status: CANCELLED | OUTPATIENT
Start: 2020-01-14

## 2020-01-14 RX ORDER — PROCHLORPERAZINE MALEATE 10 MG
10 TABLET ORAL EVERY 6 HOURS PRN
Qty: 12 TABLET | Refills: 6 | Status: SHIPPED | OUTPATIENT
Start: 2020-01-14 | End: 2020-01-14

## 2020-01-14 NOTE — PATIENT INSTRUCTIONS
Labs ordered     Our  will send you a list  Of therapist the do cognitive behavioral therapy    [employer] - Download this free [employer] on your phone (they will offer subscription, but you do not have to do this)  - I recommend listening to the first approximately 5 or so lectures (more if you want) that are about 10-20 minutes long on the neuroscience of pain  - They discuss how pain works in the brain and steps to try and cure chronic pain    The happiness advantage by Celeste Lazaro - positive psychology      I also recommend cognitive behavioral therapy: Techniques for retraining her brain by professor Constanza Quach or Referrals:     [x] Psychologist  -  I recommend establishing care with a therapist for cognitive behavioral therapy    Headache/migraine treatment:   Abortive medications (for immediate treatment of a headache): Ok to take ibuprofen or acetaminophen for headaches, but try to limit the amount and frequency that you are taking to avoid medication overuse/rebound headache   - no change to current meds, but try to take no more than 3 days per week     I recommend trial of  Metoclopramide/Reglan 10 mg as needed for migraine abortive      Over the counter preventive supplements for headaches/migraines   (to take every day to help prevent headaches - not to take at the time of headache):  - Magnesium 400-500 mg daily  - Can occasionally cause stomach upset - if so try at night, with food or stop, rarely can cause diarrhea if so stop  - Riboflavin (Vitamin B2) 400mg daily - FYI B2 may make your urine bright/neon yellow     Prescription preventive medications for headaches/migraines   (to take every day to help prevent headaches - not to take at the time of headache):  Hold off while breastfeeding    Sleep/headache prevention:  -  Melatonin - you may take 3 mg nightly for sleep  You should take this 1 hour prior to bedtime consistently every night for it to work   It works by gradually helping to adjust your sleep time over days to weeks, rather than immediately making you feel sleepy  Self-Monitoring:  - Headache calendar  Each day soy a number from 0-10 indicating if there was a headache and how bad it was  This can be used to monitor gradual improvement and is helpful to make medication adjustments  Lifestyle Recommendations:  - Maintain good sleep hygiene  Going to bed and waking up at consistent times, avoiding excessive daytime naps, avoiding caffeinated beverages in the evening, avoid excessive stimulation in the evening and generally using bed primarily for sleeping  One hour before bedtime would recommend turning lights down lower, decreasing your activity (may read quietly, listen to music at a low volume)  When you get into bed, should eliminate all technology (no texting, emailing, playing with your phone, iPad or tablet in bed)  - Maintain good hydration  Drink  2L of fluid a day (4 typical small water bottles)  - Maintain good nutrition  In particular don't skip meals and eat balanced meals regularly  Education and Follow-up  - Please contact us if any questions or concerns arise  Of course, try to protect yourself from head injuries, and if any new concerning symptoms or significant blow to the head or body go to the emergency department    - Follow up 2 months

## 2020-01-14 NOTE — PROGRESS NOTES
Review of Systems   Constitutional: Negative  Negative for appetite change and fever  HENT: Negative  Negative for hearing loss, tinnitus, trouble swallowing and voice change  Eyes: Positive for visual disturbance (Blurred visions, sensitivity to light)  Negative for photophobia and pain  Respiratory: Negative  Negative for shortness of breath  Cardiovascular: Negative  Negative for palpitations  Gastrointestinal: Positive for nausea  Negative for vomiting  Endocrine: Negative  Negative for cold intolerance and heat intolerance  Genitourinary: Negative  Negative for dysuria, frequency and urgency  Musculoskeletal: Positive for neck pain and neck stiffness  Negative for myalgias  Skin: Negative  Negative for rash  Allergic/Immunologic: Negative  Neurological: Positive for headaches  Negative for tremors, seizures, syncope, facial asymmetry, weakness, light-headedness and numbness  Memory issues   Hematological: Negative  Does not bruise/bleed easily  Psychiatric/Behavioral: Positive for agitation, confusion and sleep disturbance  Negative for hallucinations          Mood swings, Anxiety

## 2020-01-14 NOTE — TELEPHONE ENCOUNTER
----- Message from Trudy Morales MD sent at 1/14/2020  9:39 AM EST -----  Could you please help this patient with a list of therapists or psychologists covered by their insurance? Specifically needs a provider who is experienced in cognitive behavioral therapy

## 2020-01-14 NOTE — TELEPHONE ENCOUNTER
Patient filled out PAMELA at visit with Dr Rabia Mosley  Requesting records from [facility] be sent to [facility]    Scanned copy into media and faxed to [facility]
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
## 2020-01-14 NOTE — TELEPHONE ENCOUNTER
----- Message from Fitz Dominguez MD sent at 1/14/2020  9:52 AM EST -----  Could you call this pharmacy and cancel the prochlorperazine I sent, I sent metoclopramide instead

## 2020-01-14 NOTE — TELEPHONE ENCOUNTER
Received a call from [facility] they need to know if you are okay with them filling the reglan as it can interact with her sertraline causing serotonin syndrome and EPS  Please advise if you are okay to proceed  Prochlorperazine cancelled with pharmacy

## 2020-01-15 NOTE — TELEPHONE ENCOUNTER
Below list of Licensed Professional Counselors, Clinical Social Workers, and Psychologists that are trained to provide [employer] Therapy  Jake Lenz, MED   [address]  [facility]  Suite [facility] 2801 [facility] [address]73 [facility]    Kurt Santiago, PSYD  [address]  [facility]   [phone]    RAJNI DANGELO, PHD   114 [facility], 120 [facility]    1026 [facility]  [address]  [facility]   700 [facility]   [address]   [address] Krishan Jamison, MSW   130 [facility] [place] 4 301 [facility]  [phone]    Annette Jimenez, MSW  200 [facility]  [address] 301 [facility]  [phone]    [facility] MSW   [address]  [facility]  [phone]    Pam Caballero, MSW   1041 [facility]   3400 [facility]    [address] [facility], MSW   1041 [facility]   [address], MSW   [address] [facility], MSW   [address] 120 [facility], MSW   [facility] 112 [address]00 [facility] [place]   [address]  [phone]    1560 [facility]   [phone]    Lucillie Oven A  Aminah De Los Santos [address]   433 [facility]  [address], [address] 1826 [facility]   2837 [facility],[place] A, [place] [address] 1761 [facility]   [address], [place]   1415 [facility]   [address] [place], [place]   [address] [place] [address]  [address]  [place] [phone]    Ofe Baez [place]   [address] [address]   [phone]

## 2020-01-18 ENCOUNTER — HOSPITAL ENCOUNTER (OUTPATIENT)
Dept: ULTRASOUND IMAGING | Facility: HOSPITAL | Age: 30
Discharge: HOME | End: 2020-01-18
Payer: COMMERCIAL

## 2020-01-18 DIAGNOSIS — Z87.59 HISTORY OF RETAINED PLACENTA: ICD-10-CM

## 2020-01-18 PROCEDURE — 76830 TRANSVAGINAL US NON-OB: CPT

## 2020-01-18 PROCEDURE — 76856 US EXAM PELVIC COMPLETE: CPT

## 2020-01-30 LAB
ALBUMIN SERPL-MCNC: 4.3 G/DL (ref 3.6–5.1)
ALBUMIN/GLOB SERPL: 1.7 (CALC) (ref 1–2.5)
ALP SERPL-CCNC: 67 U/L (ref 33–115)
ALT SERPL-CCNC: 15 U/L (ref 6–29)
AST SERPL-CCNC: 17 U/L (ref 10–30)
BASOPHILS # BLD AUTO: 68 CELLS/UL (ref 0–200)
BASOPHILS NFR BLD AUTO: 1 %
BILIRUB SERPL-MCNC: 0.6 MG/DL (ref 0.2–1.2)
BUN SERPL-MCNC: 18 MG/DL (ref 7–25)
BUN/CREAT SERPL: NORMAL (CALC) (ref 6–22)
CALCIUM SERPL-MCNC: 9.3 MG/DL (ref 8.6–10.2)
CHLORIDE SERPL-SCNC: 105 MMOL/L (ref 98–110)
CO2 SERPL-SCNC: 25 MMOL/L (ref 20–32)
CREAT SERPL-MCNC: 0.87 MG/DL (ref 0.5–1.1)
EOSINOPHIL # BLD AUTO: 163 CELLS/UL (ref 15–500)
EOSINOPHIL NFR BLD AUTO: 2.4 %
ERYTHROCYTE [DISTWIDTH] IN BLOOD BY AUTOMATED COUNT: 12.8 % (ref 11–15)
GLOBULIN SER CALC-MCNC: 2.5 G/DL (CALC) (ref 1.9–3.7)
GLUCOSE SERPL-MCNC: 87 MG/DL (ref 65–99)
HCT VFR BLD AUTO: 40.9 % (ref 35–45)
HGB BLD-MCNC: 13.3 G/DL (ref 11.7–15.5)
LYMPHOCYTES # BLD AUTO: 2693 CELLS/UL (ref 850–3900)
LYMPHOCYTES NFR BLD AUTO: 39.6 %
MCH RBC QN AUTO: 27.5 PG (ref 27–33)
MCHC RBC AUTO-ENTMCNC: 32.5 G/DL (ref 32–36)
MCV RBC AUTO: 84.5 FL (ref 80–100)
MONOCYTES # BLD AUTO: 435 CELLS/UL (ref 200–950)
MONOCYTES NFR BLD AUTO: 6.4 %
NEUTROPHILS # BLD AUTO: 3441 CELLS/UL (ref 1500–7800)
NEUTROPHILS NFR BLD AUTO: 50.6 %
PLATELET # BLD AUTO: 292 THOUSAND/UL (ref 140–400)
PMV BLD REES-ECKER: 10.5 FL (ref 7.5–12.5)
POTASSIUM SERPL-SCNC: 4.6 MMOL/L (ref 3.5–5.3)
PROT SERPL-MCNC: 6.8 G/DL (ref 6.1–8.1)
RBC # BLD AUTO: 4.84 MILLION/UL (ref 3.8–5.1)
SL AMB EGFR AFRICAN AMERICAN: 103 ML/MIN/1.73M2
SL AMB EGFR NON AFRICAN AMERICAN: 89 ML/MIN/1.73M2
SODIUM SERPL-SCNC: 137 MMOL/L (ref 135–146)
TSH SERPL-ACNC: 1.94 MIU/L
WBC # BLD AUTO: 6.8 THOUSAND/UL (ref 3.8–10.8)

## 2020-02-10 ENCOUNTER — TELEPHONE (OUTPATIENT)
Dept: OBGYN CLINIC | Facility: MEDICAL CENTER | Age: 30
End: 2020-02-10
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
## 2020-03-09 ENCOUNTER — OFFICE VISIT (OUTPATIENT)
Dept: NEUROLOGY | Facility: CLINIC | Age: 30
End: 2020-03-09
Payer: COMMERCIAL

## 2020-03-09 VITALS
RESPIRATION RATE: 16 BRPM | SYSTOLIC BLOOD PRESSURE: 100 MMHG | HEIGHT: 63 IN | WEIGHT: 148.9 LBS | DIASTOLIC BLOOD PRESSURE: 70 MMHG | BODY MASS INDEX: 26.38 KG/M2 | HEART RATE: 70 BPM

## 2020-03-09 DIAGNOSIS — E55.9 VITAMIN D DEFICIENCY: ICD-10-CM

## 2020-03-09 DIAGNOSIS — F44.7 FUNCTIONAL NEUROLOGICAL SYMPTOM DISORDER WITH MIXED SYMPTOMS: ICD-10-CM

## 2020-03-09 DIAGNOSIS — G43.009 MIGRAINE WITHOUT AURA AND WITHOUT STATUS MIGRAINOSUS, NOT INTRACTABLE: Primary | ICD-10-CM

## 2020-03-09 DIAGNOSIS — B00.9 RECURRENT HSV (HERPES SIMPLEX VIRUS): Primary | ICD-10-CM

## 2020-03-09 DIAGNOSIS — Z87.820 H/O CONCUSSION: ICD-10-CM

## 2020-03-09 DIAGNOSIS — G44.329 CHRONIC POST-TRAUMATIC HEADACHE, NOT INTRACTABLE: ICD-10-CM

## 2020-03-09 DIAGNOSIS — F43.10 PTSD (POST-TRAUMATIC STRESS DISORDER): ICD-10-CM

## 2020-03-09 PROCEDURE — 1036F TOBACCO NON-USER: CPT | Performed by: PSYCHIATRY & NEUROLOGY

## 2020-03-09 PROCEDURE — 99213 OFFICE O/P EST LOW 20 MIN: CPT | Performed by: PSYCHIATRY & NEUROLOGY

## 2020-03-09 RX ORDER — MELATONIN 200 MCG
TABLET ORAL
COMMUNITY
End: 2020-11-03 | Stop reason: HOSPADM

## 2020-03-09 RX ORDER — ACYCLOVIR 400 MG/1
400 TABLET ORAL 2 TIMES DAILY
Qty: 90 TABLET | Refills: 0 | Status: SHIPPED | OUTPATIENT
Start: 2020-03-09 | End: 2020-07-08 | Stop reason: ALTCHOICE

## 2020-03-09 NOTE — TELEPHONE ENCOUNTER
Pt call reports cold sore and pt is looking to refill for acyclovir 400mg pt is requesting 90 pills and also requesting refill to be send to [phone] Thank you

## 2020-03-09 NOTE — TELEPHONE ENCOUNTER
Verify help patient is taking this  Would give her enough for 4-5 outbreaks  I believe for a acyclovir its 2 tablets  For fever blisters recommend doses are 800 mg 3 times daily for 2 days or 400 mg 3 times daily for 5 days    I would advise we give her 60 tablets with 1 refill

## 2020-03-09 NOTE — PATIENT INSTRUCTIONS
Try Headspace for meditation    The happiness advantage by Janes Lazaro - positive psychology        I also recommend cognitive behavioral therapy: Techniques for retraining your brain by professor Lia Bravo or Referrals:     [x] Psychologist  -  cognitive behavioral therapy     Headache/migraine treatment:   Abortive medications (for immediate treatment of a headache): Ok to take ibuprofen or acetaminophen for headaches, but try to limit the amount and frequency that you are taking to avoid medication overuse/rebound headache   - no change to current meds, but try to take no more than 3 days per week      trial of  Metoclopramide/Reglan 10 mg as needed for migraine abortive        Over the counter preventive supplements for headaches/migraines   (to take every day to help prevent headaches - not to take at the time of headache):  - Magnesium 400-500 mg daily  - Can occasionally cause stomach upset - if so try at night, with food or stop, rarely can cause diarrhea if so stop  - Riboflavin (Vitamin B2) 400mg daily - FYI B2 may make your urine bright/neon yellow - try online      Prescription preventive medications for headaches/migraines   (to take every day to help prevent headaches - not to take at the time of headache):  Hold off while breastfeeding     Sleep/headache prevention:  -  Melatonin - you may take 3 mg nightly for sleep  You should take this 1 hour prior to bedtime consistently every night for it to work  It works by gradually helping to adjust your sleep time over days to weeks, rather than immediately making you feel sleepy        Self-Monitoring:  - Headache calendar  Each day soy a number from 0-10 indicating if there was a headache and how bad it was  This can be used to monitor gradual improvement and is helpful to make medication adjustments      Lifestyle Recommendations:  - Maintain good sleep hygiene    Going to bed and waking up at consistent times, avoiding excessive daytime naps, avoiding caffeinated beverages in the evening, avoid excessive stimulation in the evening and generally using bed primarily for sleeping  One hour before bedtime would recommend turning lights down lower, decreasing your activity (may read quietly, listen to music at a low volume)  When you get into bed, should eliminate all technology (no texting, emailing, playing with your phone, iPad or tablet in bed)  - Maintain good hydration  Drink  2L of fluid a day (4 typical small water bottles)  - Maintain good nutrition  In particular don't skip meals and eat balanced meals regularly         Education and Follow-up  - Please contact us if any questions or concerns arise  Of course, try to protect yourself from head injuries, and if any new concerning symptoms or significant blow to the head or body go to the emergency department    - Follow up as scheduled  6/3/20

## 2020-03-17 DIAGNOSIS — Z32.01 POSITIVE URINE PREGNANCY TEST: Primary | ICD-10-CM

## 2020-03-17 NOTE — TELEPHONE ENCOUNTER
Patient stated she is on progesterone 200mg suppository and is in need of a refill but I did not come across this medication in the patient chart

## 2020-03-18 DIAGNOSIS — Z32.01 PREGNANCY TEST POSITIVE: Primary | ICD-10-CM

## 2020-03-19 DIAGNOSIS — Z32.01 POSITIVE URINE PREGNANCY TEST: Primary | ICD-10-CM

## 2020-03-20 DIAGNOSIS — O36.80X0 ENCOUNTER TO DETERMINE FETAL VIABILITY OF PREGNANCY, SINGLE OR UNSPECIFIED FETUS: Primary | ICD-10-CM

## 2020-03-20 LAB
ABO GROUP BLD: NORMAL
B-HCG SERPL-ACNC: 8990 MIU/ML
PROGEST SERPL-MCNC: 46.7 NG/ML
RH BLD: NORMAL

## 2020-04-03 ENCOUNTER — HOSPITAL ENCOUNTER (OUTPATIENT)
Dept: ULTRASOUND IMAGING | Facility: HOSPITAL | Age: 30
Discharge: HOME | End: 2020-04-03
Payer: COMMERCIAL

## 2020-04-03 DIAGNOSIS — O36.80X0 ENCOUNTER TO DETERMINE FETAL VIABILITY OF PREGNANCY, SINGLE OR UNSPECIFIED FETUS: ICD-10-CM

## 2020-04-03 PROCEDURE — 76801 OB US < 14 WKS SINGLE FETUS: CPT

## 2020-04-04 DIAGNOSIS — O99.340 DEPRESSION AFFECTING PREGNANCY: ICD-10-CM

## 2020-04-04 DIAGNOSIS — F32.A DEPRESSION AFFECTING PREGNANCY: ICD-10-CM

## 2020-04-06 DIAGNOSIS — B00.9 RECURRENT HSV (HERPES SIMPLEX VIRUS): ICD-10-CM

## 2020-04-06 RX ORDER — ACYCLOVIR 400 MG/1
TABLET ORAL
Qty: 60 TABLET | Refills: 1 | OUTPATIENT
Start: 2020-04-06

## 2020-04-16 ENCOUNTER — TELEMEDICINE (OUTPATIENT)
Dept: OBGYN CLINIC | Facility: MEDICAL CENTER | Age: 30
End: 2020-04-16
Payer: COMMERCIAL

## 2020-04-16 DIAGNOSIS — Z34.91 ENCOUNTER FOR PREGNANCY RELATED EXAMINATION IN FIRST TRIMESTER: Primary | ICD-10-CM

## 2020-04-16 PROCEDURE — 99443 PR PHYS/QHP TELEPHONE EVALUATION 21-30 MIN: CPT | Performed by: OBSTETRICS & GYNECOLOGY

## 2020-04-30 ENCOUNTER — TELEPHONE (OUTPATIENT)
Dept: PERINATAL CARE | Facility: OTHER | Age: 30
End: 2020-04-30

## 2020-05-11 ENCOUNTER — TELEPHONE (OUTPATIENT)
Dept: PERINATAL CARE | Facility: OTHER | Age: 30
End: 2020-05-11

## 2020-05-12 ENCOUNTER — ROUTINE PRENATAL (OUTPATIENT)
Dept: PERINATAL CARE | Facility: OTHER | Age: 30
End: 2020-05-12
Payer: COMMERCIAL

## 2020-05-12 VITALS
HEART RATE: 80 BPM | TEMPERATURE: 98 F | DIASTOLIC BLOOD PRESSURE: 75 MMHG | SYSTOLIC BLOOD PRESSURE: 116 MMHG | BODY MASS INDEX: 27.71 KG/M2 | HEIGHT: 63 IN | WEIGHT: 156.4 LBS

## 2020-05-12 DIAGNOSIS — Z3A.13 13 WEEKS GESTATION OF PREGNANCY: ICD-10-CM

## 2020-05-12 DIAGNOSIS — Z36.82 ENCOUNTER FOR (NT) NUCHAL TRANSLUCENCY SCAN: Primary | ICD-10-CM

## 2020-05-12 PROBLEM — Z98.890 S/P D&C (STATUS POST DILATION AND CURETTAGE): Status: RESOLVED | Noted: 2019-04-29 | Resolved: 2020-05-12

## 2020-05-12 PROBLEM — F41.9 ANXIETY: Status: ACTIVE | Noted: 2020-05-12

## 2020-05-12 PROCEDURE — 76813 OB US NUCHAL MEAS 1 GEST: CPT | Performed by: OBSTETRICS & GYNECOLOGY

## 2020-05-12 PROCEDURE — 99242 OFF/OP CONSLTJ NEW/EST SF 20: CPT | Performed by: OBSTETRICS & GYNECOLOGY

## 2020-05-14 LAB
EXTERNAL HEMATOCRIT: 39.3 %
EXTERNAL HEMOGLOBIN: 12.6 G/DL
EXTERNAL HEPATITIS B SURFACE ANTIGEN: NORMAL
EXTERNAL HIV-1/2 AB-AG: NORMAL
EXTERNAL RH FACTOR: POSITIVE
EXTERNAL RUBELLA IGG QUANTITATION: NORMAL

## 2020-05-15 ENCOUNTER — INITIAL PRENATAL (OUTPATIENT)
Dept: OBGYN CLINIC | Facility: MEDICAL CENTER | Age: 30
End: 2020-05-15

## 2020-05-15 VITALS — DIASTOLIC BLOOD PRESSURE: 62 MMHG | SYSTOLIC BLOOD PRESSURE: 116 MMHG | BODY MASS INDEX: 27.46 KG/M2 | WEIGHT: 155 LBS

## 2020-05-15 DIAGNOSIS — Z3A.14 14 WEEKS GESTATION OF PREGNANCY: ICD-10-CM

## 2020-05-15 DIAGNOSIS — Z11.3 SCREENING EXAMINATION FOR STD (SEXUALLY TRANSMITTED DISEASE): ICD-10-CM

## 2020-05-15 DIAGNOSIS — R11.2 NAUSEA AND VOMITING, INTRACTABILITY OF VOMITING NOT SPECIFIED, UNSPECIFIED VOMITING TYPE: Primary | ICD-10-CM

## 2020-05-15 DIAGNOSIS — Z34.92 SECOND TRIMESTER PREGNANCY: ICD-10-CM

## 2020-05-15 LAB
EXTERNAL HEMATOCRIT: 39.3 %
EXTERNAL HEMOGLOBIN: 12.6 G/DL
EXTERNAL HEPATITIS B SURFACE ANTIGEN: NORMAL
EXTERNAL HIV-1/2 AB-AG: NORMAL
EXTERNAL RH FACTOR: POSITIVE
EXTERNAL RUBELLA IGG QUANTITATION: NORMAL
EXTERNAL SYPHILIS RPR SCREEN: NORMAL

## 2020-05-15 PROCEDURE — PNV: Performed by: NURSE PRACTITIONER

## 2020-05-15 RX ORDER — ASPIRIN 81 MG/1
81 TABLET, CHEWABLE ORAL DAILY
Status: ON HOLD | COMMUNITY
End: 2020-11-01 | Stop reason: ALTCHOICE

## 2020-05-15 RX ORDER — DOXYLAMINE SUCCINATE AND PYRIDOXINE HYDROCHLORIDE, DELAYED RELEASE TABLETS 10 MG/10 MG 10; 10 MG/1; MG/1
TABLET, DELAYED RELEASE ORAL
Qty: 120 TABLET | Refills: 3 | Status: SHIPPED | OUTPATIENT
Start: 2020-05-15 | End: 2020-07-08 | Stop reason: ALTCHOICE

## 2020-05-18 LAB
ABO GROUP BLD: NORMAL
BASOPHILS # BLD AUTO: 48 CELLS/UL (ref 0–200)
BASOPHILS NFR BLD AUTO: 0.5 %
BLD GP AB SCN SERPL QL: NORMAL
C TRACH RRNA SPEC QL NAA+PROBE: NOT DETECTED
EOSINOPHIL # BLD AUTO: 114 CELLS/UL (ref 15–500)
EOSINOPHIL NFR BLD AUTO: 1.2 %
ERYTHROCYTE [DISTWIDTH] IN BLOOD BY AUTOMATED COUNT: 13.5 % (ref 11–15)
HBV SURFACE AG SERPL QL IA: NORMAL
HCT VFR BLD AUTO: 39.3 % (ref 35–45)
HGB BLD-MCNC: 12.6 G/DL (ref 11.7–15.5)
HIV 1+2 AB+HIV1 P24 AG SERPL QL IA: NORMAL
LYMPHOCYTES # BLD AUTO: 2271 CELLS/UL (ref 850–3900)
LYMPHOCYTES NFR BLD AUTO: 23.9 %
MCH RBC QN AUTO: 27.6 PG (ref 27–33)
MCHC RBC AUTO-ENTMCNC: 32.1 G/DL (ref 32–36)
MCV RBC AUTO: 86.2 FL (ref 80–100)
MONOCYTES # BLD AUTO: 532 CELLS/UL (ref 200–950)
MONOCYTES NFR BLD AUTO: 5.6 %
N GONORRHOEA RRNA SPEC QL NAA+PROBE: NOT DETECTED
NEUTROPHILS # BLD AUTO: 6536 CELLS/UL (ref 1500–7800)
NEUTROPHILS NFR BLD AUTO: 68.8 %
PLATELET # BLD AUTO: 303 THOUSAND/UL (ref 140–400)
PMV BLD REES-ECKER: 9.8 FL (ref 7.5–12.5)
RBC # BLD AUTO: 4.56 MILLION/UL (ref 3.8–5.1)
RH BLD: NORMAL
RPR SER QL: NORMAL
RUBV IGG SERPL IA-ACNC: 1.46 INDEX
WBC # BLD AUTO: 9.5 THOUSAND/UL (ref 3.8–10.8)

## 2020-07-07 ENCOUNTER — TELEPHONE (OUTPATIENT)
Dept: PERINATAL CARE | Facility: CLINIC | Age: 30
End: 2020-07-07

## 2020-07-07 NOTE — TELEPHONE ENCOUNTER
Attempted to reach patient by phone and left voicemail to confirm appointment for MFM ultrasound  1 support person ( must be over the age of 15) may accompany you for your appointment  You must wear a mask (covering nose and mouth) during your visit  You and your support person will be screened upon arrival   IF not feeling well- cough, fever, shortness of breath or any flu like symptoms contact your primary care physician or [address] [place] [address] Genie Tamayo  Please call our office prior to entering the building  Check in and rooming questions will be done via phone  Inside office # provided:  [place] line: [phone]  [place] line:  [phone]  [facility] line:  [address] line:  [phone]  [place] line:  [phone]  [place] line:  [phone]    [facility] does not allow cell phone use, recording device or streaming during ultrasound     Any questions with these instructions please call Maternal Fetal Medicine nurse line today @ # [phone]

## 2020-07-08 ENCOUNTER — ROUTINE PRENATAL (OUTPATIENT)
Dept: PERINATAL CARE | Facility: CLINIC | Age: 30
End: 2020-07-08
Payer: COMMERCIAL

## 2020-07-08 VITALS
BODY MASS INDEX: 30.12 KG/M2 | DIASTOLIC BLOOD PRESSURE: 68 MMHG | TEMPERATURE: 97.7 F | HEART RATE: 88 BPM | WEIGHT: 170 LBS | SYSTOLIC BLOOD PRESSURE: 116 MMHG | HEIGHT: 63 IN

## 2020-07-08 DIAGNOSIS — Z3A.21 21 WEEKS GESTATION OF PREGNANCY: ICD-10-CM

## 2020-07-08 DIAGNOSIS — Z87.59 HISTORY OF PLACENTA ACCRETA: ICD-10-CM

## 2020-07-08 DIAGNOSIS — Z36.86 ENCOUNTER FOR ANTENATAL SCREENING FOR CERVICAL LENGTH: ICD-10-CM

## 2020-07-08 DIAGNOSIS — G43.711 CHRONIC MIGRAINE WITHOUT AURA, WITH INTRACTABLE MIGRAINE, SO STATED, WITH STATUS MIGRAINOSUS: ICD-10-CM

## 2020-07-08 PROCEDURE — 99213 OFFICE O/P EST LOW 20 MIN: CPT | Performed by: OBSTETRICS & GYNECOLOGY

## 2020-07-08 PROCEDURE — 76817 TRANSVAGINAL US OBSTETRIC: CPT | Performed by: OBSTETRICS & GYNECOLOGY

## 2020-07-08 PROCEDURE — 1036F TOBACCO NON-USER: CPT | Performed by: OBSTETRICS & GYNECOLOGY

## 2020-07-08 PROCEDURE — 76811 OB US DETAILED SNGL FETUS: CPT | Performed by: OBSTETRICS & GYNECOLOGY

## 2020-07-08 NOTE — LETTER
July 8, 2020     Nick Faulkner MD  [address]    Patient: Tabitha Cifuentes   YOB: 1990   Date of Visit: 7/8/2020       Dear Dr Erick Campbell:    Thank you for referring Maxi Puga to me for evaluation  Below are my notes for this consultation  If you have questions, please do not hesitate to call me  I look forward to following your patient along with you  Sincerely,        Reji Shepard MD        CC: No Recipients  Reji Shepard MD  7/8/2020 11:51 PM  Sign at close encounter  Tabitha Cifuentes  has no complaints today  She reports fetal movements and denies vaginal bleeding  Problem list:  1  History of retained placenta in 2 prior pregnancies which required manual extraction of the placenta and hysteroscopic removal of retained placenta using MyoSure 2 months postpartum on the anterior right side of her uterus  Her 13 week ultrasound showed some prominent lacunae which were suspicious for a placental accreta  1st pregnancy had an anterior placenta and her second pregnancy had a right lateral placenta  2  Declined genetic screening    3  Maternal anxiety controlled on Zoloft  4  Family history of spina bifida in a fetus of her mother which would have been a sibling of Cecilia Fareed  She also has a family history of diabetes in her father and hypertension in her mother  Ultrasound findings: The ultrasound today shows normal interval fetal growth and fluid, normal cervical length, and no malformations were detected  The placenta is posterior  There are some venous lakes but the I did not see any loss of the echolucent zone of the uterus or excessive vascularity of the placenta to suggest an accreta exists  US though can not rule out an accreta for sure  Pregnancy ultrasound has limitations and is unable to detect all forms of fetal congenital abnormalities        Follow up:  Recommend a follow-up 32 for growth and to review for any signs of an accreta  Review of her chart looks like she is on Excedrin which has 250 milligrams of aspirin and can be taken every 6 hours for 24 hrs  She is also on 81 milligrams of baby aspirin daily but does not take her baby aspirin if she is taking her Excedrin  She states she last took excedrin about 2 months ago and she usually would only take 1 tablet  We discussed that there are other options to decrease the frequency of headaches such that she would not need to use Excedrin  We discussed that in rare cases the use of too much aspirin or Motrin may cause a narrowing or closure of the ductus in utero which may cause cardiac issues  High-dose aspirin or Motrin can also renal issues in the feet which may lead to oligohydramnios  162 milligrams or less is felt to be safe in pregnancy even if taken daily in pregnancy for the prevention of preeclampsia  Chronic prevention of migraines:  Riboflavin 400 mg daily  Magnesium oxide 250-400 mg daily  Cyproheptadine Category B 4 mg prior to bed or 4 mg after breakfast and 4 mg prior to bedtime  Cyclobenzaprine Category B 10 mg-30 mg every night  Gabapentin Category C given 300 mg before bedtime for 1 week then increase to 600 mg before bedtime  Verapamil Category C 40 mg TID or 120 mg extended release daily  Metoprolol Category C 25 mg BID    There are interactions between Verapamil and Metoprolol and Verapamil and Cyclobenzaprine so recommend avoiding the use of these together  The majority of time ( greater than 50 percent) was spent on counseling and coordination of care with the patient and her family member  Approximately physician face-to-face time was 15 minutes       Maryana Martinez MD

## 2020-07-08 NOTE — PROGRESS NOTES
A transvaginal ultrasound was performed  Sonographer note on use of High Level Disinfection Process (Trophon) for transvaginal probe#  2 used, serial # [id number]    David Nye RDMS

## 2020-07-08 NOTE — PROGRESS NOTES
Nicolás James  has no complaints today  She reports fetal movements and denies vaginal bleeding  Problem list:  1  History of retained placenta in 2 prior pregnancies which required manual extraction of the placenta and hysteroscopic removal of retained placenta using MyoSure 2 months postpartum on the anterior right side of her uterus  Her 13 week ultrasound showed some prominent lacunae which were suspicious for a placental accreta  1st pregnancy had an anterior placenta and her second pregnancy had a right lateral placenta  2  Declined genetic screening    3  Maternal anxiety controlled on Zoloft  4  Family history of spina bifida in a fetus of her mother which would have been a sibling of Cecilia Guerrier  She also has a family history of diabetes in her father and hypertension in her mother  Ultrasound findings: The ultrasound today shows normal interval fetal growth and fluid, normal cervical length, and no malformations were detected  The placenta is posterior  There are some venous lakes but the I did not see any loss of the echolucent zone of the uterus or excessive vascularity of the placenta to suggest an accreta exists  US though can not rule out an accreta for sure  Pregnancy ultrasound has limitations and is unable to detect all forms of fetal congenital abnormalities  Follow up:  Recommend a follow-up 32 for growth and to review for any signs of an accreta  Review of her chart looks like she is on Excedrin which has 250 milligrams of aspirin and can be taken every 6 hours for 24 hrs  She is also on 81 milligrams of baby aspirin daily but does not take her baby aspirin if she is taking her Excedrin  She states she last took excedrin about 2 months ago and she usually would only take 1 tablet  We discussed that there are other options to decrease the frequency of headaches such that she would not need to use Excedrin    We discussed that in rare cases the use of too much aspirin or Motrin may cause a narrowing or closure of the ductus in utero which may cause cardiac issues  High-dose aspirin or Motrin can also renal issues in the feet which may lead to oligohydramnios  162 milligrams or less is felt to be safe in pregnancy even if taken daily in pregnancy for the prevention of preeclampsia  Chronic prevention of migraines:  Riboflavin 400 mg daily  Magnesium oxide 250-400 mg daily  Cyproheptadine Category B 4 mg prior to bed or 4 mg after breakfast and 4 mg prior to bedtime  Cyclobenzaprine Category B 10 mg-30 mg every night  Gabapentin Category C given 300 mg before bedtime for 1 week then increase to 600 mg before bedtime  Verapamil Category C 40 mg TID or 120 mg extended release daily  Metoprolol Category C 25 mg BID    There are interactions between Verapamil and Metoprolol and Verapamil and Cyclobenzaprine so recommend avoiding the use of these together  The majority of time ( greater than 50 percent) was spent on counseling and coordination of care with the patient and her family member  Approximately physician face-to-face time was 15 minutes       Mariely Estrada MD

## 2020-07-09 ENCOUNTER — ROUTINE PRENATAL (OUTPATIENT)
Dept: OBGYN CLINIC | Facility: CLINIC | Age: 30
End: 2020-07-09

## 2020-07-09 VITALS
BODY MASS INDEX: 30.4 KG/M2 | DIASTOLIC BLOOD PRESSURE: 64 MMHG | TEMPERATURE: 96.7 F | SYSTOLIC BLOOD PRESSURE: 110 MMHG | WEIGHT: 171.6 LBS

## 2020-07-09 DIAGNOSIS — R51.9 HEADACHE IN PREGNANCY, ANTEPARTUM, SECOND TRIMESTER: ICD-10-CM

## 2020-07-09 DIAGNOSIS — Z34.92 SECOND TRIMESTER PREGNANCY: Primary | ICD-10-CM

## 2020-07-09 DIAGNOSIS — O26.892 HEADACHE IN PREGNANCY, ANTEPARTUM, SECOND TRIMESTER: ICD-10-CM

## 2020-07-09 PROCEDURE — PNV: Performed by: OBSTETRICS & GYNECOLOGY

## 2020-07-09 RX ORDER — BUTALBITAL, ACETAMINOPHEN AND CAFFEINE 300; 40; 50 MG/1; MG/1; MG/1
1 CAPSULE ORAL 4 TIMES DAILY PRN
Qty: 20 CAPSULE | Refills: 0 | Status: SHIPPED | OUTPATIENT
Start: 2020-07-09 | End: 2020-09-16 | Stop reason: SDUPTHER

## 2020-07-09 NOTE — PROGRESS NOTES
Fer Barclay is a 26y o  year old [id number] at [address] [place] [address] for routine prenatal visit    + FM, no vaginal bleeding, contractions, or LOF  Complaints: No   Most recent ultrasound and labs reviewed    We discussed migraines in pregnancy - recommendation from MFM reviewed / interested in script for fioricet   Has 32 week US scheduled to follow placentation given hx of 2 prior retained placentas

## 2020-08-11 ENCOUNTER — ROUTINE PRENATAL (OUTPATIENT)
Dept: OBGYN CLINIC | Facility: MEDICAL CENTER | Age: 30
End: 2020-08-11

## 2020-08-11 VITALS — DIASTOLIC BLOOD PRESSURE: 68 MMHG | BODY MASS INDEX: 31.18 KG/M2 | SYSTOLIC BLOOD PRESSURE: 120 MMHG | WEIGHT: 176 LBS

## 2020-08-11 DIAGNOSIS — Z3A.26 26 WEEKS GESTATION OF PREGNANCY: Primary | ICD-10-CM

## 2020-08-11 DIAGNOSIS — Z34.92 SECOND TRIMESTER PREGNANCY: ICD-10-CM

## 2020-08-11 PROCEDURE — PNV: Performed by: ADVANCED PRACTICE MIDWIFE

## 2020-08-11 NOTE — PROGRESS NOTES
Severiano Gouty is a 26y o  year old [id number] at 26w4d for routine prenatal visit  GTT and CBC done today  RhoGam needed No  Tdap needed Yes Given: No  FKC reviewed  Still breastfeeding at night- comfort for child, but feels breast changes starting  Needs TDAP, Consent and Birth Plan next visit

## 2020-09-04 PROBLEM — O43.213 PLACENTA ACCRETA IN THIRD TRIMESTER: Status: ACTIVE | Noted: 2019-04-29

## 2020-09-04 NOTE — PROGRESS NOTES
GTT and CBC - get the results  GTT 86 will get copy     Tdap - refused today       Placenta Accreta is strongly suspected  She will get another scan on - 9/16/20      Went over precautions and place of delivery will need to be in [place] if truly has it    After the sonogram on 9/16 will need to have a meeting about what is best for the patient concerning delivery   Pt is under the impression that this is not accreta but only being careful

## 2020-09-08 ENCOUNTER — ROUTINE PRENATAL (OUTPATIENT)
Dept: OBGYN CLINIC | Facility: MEDICAL CENTER | Age: 30
End: 2020-09-08

## 2020-09-08 VITALS — SYSTOLIC BLOOD PRESSURE: 118 MMHG | WEIGHT: 181 LBS | BODY MASS INDEX: 32.06 KG/M2 | DIASTOLIC BLOOD PRESSURE: 80 MMHG

## 2020-09-08 DIAGNOSIS — O43.213 PLACENTA ACCRETA IN THIRD TRIMESTER: Primary | ICD-10-CM

## 2020-09-08 DIAGNOSIS — Z34.83 PRENATAL CARE, SUBSEQUENT PREGNANCY, THIRD TRIMESTER: ICD-10-CM

## 2020-09-08 PROBLEM — Z3A.32 32 WEEKS GESTATION OF PREGNANCY: Status: ACTIVE | Noted: 2020-05-12

## 2020-09-08 PROCEDURE — PNV: Performed by: OBSTETRICS & GYNECOLOGY

## 2020-09-16 ENCOUNTER — ULTRASOUND (OUTPATIENT)
Dept: PERINATAL CARE | Facility: CLINIC | Age: 30
End: 2020-09-16
Payer: COMMERCIAL

## 2020-09-16 VITALS
TEMPERATURE: 98.3 F | BODY MASS INDEX: 31.34 KG/M2 | SYSTOLIC BLOOD PRESSURE: 122 MMHG | WEIGHT: 183.6 LBS | DIASTOLIC BLOOD PRESSURE: 64 MMHG | HEART RATE: 94 BPM | HEIGHT: 64 IN

## 2020-09-16 DIAGNOSIS — Z3A.31 31 WEEKS GESTATION OF PREGNANCY: ICD-10-CM

## 2020-09-16 DIAGNOSIS — O43.213 PLACENTA ACCRETA IN THIRD TRIMESTER: Primary | ICD-10-CM

## 2020-09-16 PROCEDURE — 76816 OB US FOLLOW-UP PER FETUS: CPT | Performed by: OBSTETRICS & GYNECOLOGY

## 2020-09-16 PROCEDURE — 99213 OFFICE O/P EST LOW 20 MIN: CPT | Performed by: OBSTETRICS & GYNECOLOGY

## 2020-09-16 NOTE — PROGRESS NOTES
The patient was seen today for an ultrasound  Please see ultrasound report (located under Ob Procedures) for additional details  Thank you very much for allowing us to participate in the care of this very nice patient  Should you have any questions, please do not hesitate to contact me  lAl Pitts MD [address]  Attending Physician, [place]

## 2020-09-22 ENCOUNTER — ROUTINE PRENATAL (OUTPATIENT)
Dept: OBGYN CLINIC | Facility: MEDICAL CENTER | Age: 30
End: 2020-09-22

## 2020-09-22 VITALS — WEIGHT: 181.3 LBS | SYSTOLIC BLOOD PRESSURE: 110 MMHG | BODY MASS INDEX: 31.61 KG/M2 | DIASTOLIC BLOOD PRESSURE: 70 MMHG

## 2020-09-22 DIAGNOSIS — Z3A.32 32 WEEKS GESTATION OF PREGNANCY: Primary | ICD-10-CM

## 2020-09-22 DIAGNOSIS — O26.843 SIZE OF FETUS INCONSISTENT WITH DATES IN THIRD TRIMESTER: ICD-10-CM

## 2020-09-22 PROCEDURE — PNV: Performed by: OBSTETRICS & GYNECOLOGY

## 2020-09-22 NOTE — PROGRESS NOTES
Angel Acuna is a 26y o  year old  at 32w4d for routine prenatal visit    + FM, no vaginal bleeding, contractions, or LOF  Complaints: No   Most recent ultrasound and labs reviewed    38 week induction discussed, will schedule for 2020 with [address]  GBS at next visit

## 2020-09-25 ENCOUNTER — TELEPHONE (OUTPATIENT)
Dept: OBGYN CLINIC | Facility: MEDICAL CENTER | Age: 30
End: 2020-09-25

## 2020-09-25 PROBLEM — O43.93 DISORDER OF PLACENTA IN THIRD TRIMESTER: Status: ACTIVE | Noted: 2020-09-25

## 2020-09-25 NOTE — TELEPHONE ENCOUNTER
----- Message from German Riggins sent at 9/25/2020 11:54 AM EDT -----  Juhi He with Rosa Cerna at L&D  Pt is scheduled for 11/3/20 at 8pm   ----- Message -----  From: Leonides Finney MD  Sent: 9/22/2020   2:48 PM EDT  To: German Riggins    Please schedule for IOL on 11/3 into 11/4  She will be 38 weeks, per MFM recommendations  Since it's indicated, we should be able to put it on now

## 2020-09-28 DIAGNOSIS — O99.340 DEPRESSION AFFECTING PREGNANCY: ICD-10-CM

## 2020-09-28 DIAGNOSIS — F32.A DEPRESSION AFFECTING PREGNANCY: ICD-10-CM

## 2020-10-09 ENCOUNTER — ROUTINE PRENATAL (OUTPATIENT)
Dept: OBGYN CLINIC | Facility: MEDICAL CENTER | Age: 30
End: 2020-10-09

## 2020-10-09 VITALS — BODY MASS INDEX: 32.4 KG/M2 | WEIGHT: 185.8 LBS | DIASTOLIC BLOOD PRESSURE: 66 MMHG | SYSTOLIC BLOOD PRESSURE: 114 MMHG

## 2020-10-09 DIAGNOSIS — Z3A.35 35 WEEKS GESTATION OF PREGNANCY: ICD-10-CM

## 2020-10-09 DIAGNOSIS — Z34.93 THIRD TRIMESTER PREGNANCY: Primary | ICD-10-CM

## 2020-10-09 DIAGNOSIS — O43.93 DISORDER OF PLACENTA IN THIRD TRIMESTER: ICD-10-CM

## 2020-10-09 PROCEDURE — PNV: Performed by: OBSTETRICS & GYNECOLOGY

## 2020-10-09 PROCEDURE — 87653 STREP B DNA AMP PROBE: CPT | Performed by: OBSTETRICS & GYNECOLOGY

## 2020-10-11 LAB — GP B STREP DNA SPEC QL NAA+PROBE: NORMAL

## 2020-10-19 ENCOUNTER — TELEPHONE (OUTPATIENT)
Dept: OBGYN CLINIC | Facility: CLINIC | Age: 30
End: 2020-10-19

## 2020-10-19 ENCOUNTER — HOSPITAL ENCOUNTER (OUTPATIENT)
Facility: HOSPITAL | Age: 30
Discharge: HOME | End: 2020-10-19
Attending: OBSTETRICS & GYNECOLOGY | Admitting: OBSTETRICS & GYNECOLOGY
Payer: COMMERCIAL

## 2020-10-19 VITALS — SYSTOLIC BLOOD PRESSURE: 131 MMHG | DIASTOLIC BLOOD PRESSURE: 77 MMHG | HEART RATE: 76 BPM

## 2020-10-19 PROBLEM — Z3A.36 36 WEEKS GESTATION OF PREGNANCY: Status: ACTIVE | Noted: 2020-05-12

## 2020-10-19 LAB
ALBUMIN SERPL BCP-MCNC: 2.7 G/DL (ref 3.5–5)
ALP SERPL-CCNC: 146 U/L (ref 46–116)
ALT SERPL W P-5'-P-CCNC: 26 U/L (ref 12–78)
ANION GAP SERPL CALCULATED.3IONS-SCNC: 11 MMOL/L (ref 4–13)
AST SERPL W P-5'-P-CCNC: 22 U/L (ref 5–45)
BASOPHILS # BLD MANUAL: 0 THOUSAND/UL (ref 0–0.1)
BASOPHILS NFR MAR MANUAL: 0 % (ref 0–1)
BILIRUB SERPL-MCNC: 0.31 MG/DL (ref 0.2–1)
BUN SERPL-MCNC: 9 MG/DL (ref 5–25)
CALCIUM ALBUM COR SERPL-MCNC: 10.2 MG/DL (ref 8.3–10.1)
CALCIUM SERPL-MCNC: 9.2 MG/DL (ref 8.3–10.1)
CHLORIDE SERPL-SCNC: 101 MMOL/L (ref 100–108)
CO2 SERPL-SCNC: 21 MMOL/L (ref 21–32)
CREAT SERPL-MCNC: 0.63 MG/DL (ref 0.6–1.3)
CREAT UR-MCNC: 17.5 MG/DL
EOSINOPHIL # BLD MANUAL: 0.11 THOUSAND/UL (ref 0–0.4)
EOSINOPHIL NFR BLD MANUAL: 1 % (ref 0–6)
ERYTHROCYTE [DISTWIDTH] IN BLOOD BY AUTOMATED COUNT: 13.5 % (ref 11.6–15.1)
GFR SERPL CREATININE-BSD FRML MDRD: 119 ML/MIN/1.73SQ M
GLUCOSE SERPL-MCNC: 82 MG/DL (ref 65–140)
HCT VFR BLD AUTO: 39.9 % (ref 34.8–46.1)
HGB BLD-MCNC: 12.6 G/DL (ref 11.5–15.4)
LG PLATELETS BLD QL SMEAR: PRESENT
LYMPHOCYTES # BLD AUTO: 1.69 THOUSAND/UL (ref 0.6–4.47)
LYMPHOCYTES # BLD AUTO: 16 % (ref 14–44)
MCH RBC QN AUTO: 28.2 PG (ref 26.8–34.3)
MCHC RBC AUTO-ENTMCNC: 31.6 G/DL (ref 31.4–37.4)
MCV RBC AUTO: 89 FL (ref 82–98)
MONOCYTES # BLD AUTO: 0.63 THOUSAND/UL (ref 0–1.22)
MONOCYTES NFR BLD: 6 % (ref 4–12)
NEUTROPHILS # BLD MANUAL: 7.49 THOUSAND/UL (ref 1.85–7.62)
NEUTS SEG NFR BLD AUTO: 71 % (ref 43–75)
NRBC BLD AUTO-RTO: 0 /100 WBCS
PLATELET # BLD AUTO: 262 THOUSANDS/UL (ref 149–390)
PLATELET BLD QL SMEAR: ADEQUATE
PMV BLD AUTO: 10.2 FL (ref 8.9–12.7)
POTASSIUM SERPL-SCNC: 3.8 MMOL/L (ref 3.5–5.3)
PROMYELOCYTES NFR BLD MANUAL: 2 % (ref 0–0)
PROT SERPL-MCNC: 6.7 G/DL (ref 6.4–8.2)
PROT UR-MCNC: <6 MG/DL
PROT/CREAT UR: <0.34 MG/G{CREAT} (ref 0–0.1)
RBC # BLD AUTO: 4.47 MILLION/UL (ref 3.81–5.12)
RBC MORPH BLD: NORMAL
SODIUM SERPL-SCNC: 133 MMOL/L (ref 136–145)
TOTAL CELLS COUNTED SPEC: 100
VARIANT LYMPHS # BLD AUTO: 4 %
WBC # BLD AUTO: 10.55 THOUSAND/UL (ref 4.31–10.16)

## 2020-10-19 PROCEDURE — 84156 ASSAY OF PROTEIN URINE: CPT | Performed by: OBSTETRICS & GYNECOLOGY

## 2020-10-19 PROCEDURE — 99214 OFFICE O/P EST MOD 30 MIN: CPT | Performed by: OBSTETRICS & GYNECOLOGY

## 2020-10-19 PROCEDURE — 99212 OFFICE O/P EST SF 10 MIN: CPT

## 2020-10-19 PROCEDURE — 85007 BL SMEAR W/DIFF WBC COUNT: CPT | Performed by: OBSTETRICS & GYNECOLOGY

## 2020-10-19 PROCEDURE — 85027 COMPLETE CBC AUTOMATED: CPT | Performed by: OBSTETRICS & GYNECOLOGY

## 2020-10-19 PROCEDURE — 80053 COMPREHEN METABOLIC PANEL: CPT | Performed by: OBSTETRICS & GYNECOLOGY

## 2020-10-19 PROCEDURE — 82570 ASSAY OF URINE CREATININE: CPT | Performed by: OBSTETRICS & GYNECOLOGY

## 2020-10-23 ENCOUNTER — ROUTINE PRENATAL (OUTPATIENT)
Dept: OBGYN CLINIC | Facility: MEDICAL CENTER | Age: 30
End: 2020-10-23

## 2020-10-23 VITALS — DIASTOLIC BLOOD PRESSURE: 75 MMHG | SYSTOLIC BLOOD PRESSURE: 120 MMHG

## 2020-10-23 DIAGNOSIS — Z34.93 THIRD TRIMESTER PREGNANCY: Primary | ICD-10-CM

## 2020-10-23 DIAGNOSIS — O43.93 DISORDER OF PLACENTA IN THIRD TRIMESTER: ICD-10-CM

## 2020-10-23 PROCEDURE — PNV: Performed by: OBSTETRICS & GYNECOLOGY

## 2020-10-30 ENCOUNTER — ROUTINE PRENATAL (OUTPATIENT)
Dept: OBGYN CLINIC | Facility: MEDICAL CENTER | Age: 30
End: 2020-10-30

## 2020-10-30 VITALS — DIASTOLIC BLOOD PRESSURE: 80 MMHG | WEIGHT: 185.3 LBS | BODY MASS INDEX: 32.31 KG/M2 | SYSTOLIC BLOOD PRESSURE: 120 MMHG

## 2020-10-30 DIAGNOSIS — O43.93 DISORDER OF PLACENTA IN THIRD TRIMESTER: ICD-10-CM

## 2020-10-30 DIAGNOSIS — Z34.93 THIRD TRIMESTER PREGNANCY: Primary | ICD-10-CM

## 2020-10-30 PROCEDURE — PNV: Performed by: OBSTETRICS & GYNECOLOGY

## 2020-11-01 ENCOUNTER — ANESTHESIA EVENT (INPATIENT)
Dept: ANESTHESIOLOGY | Facility: HOSPITAL | Age: 30
End: 2020-11-01
Payer: COMMERCIAL

## 2020-11-01 ENCOUNTER — ANESTHESIA (INPATIENT)
Dept: ANESTHESIOLOGY | Facility: HOSPITAL | Age: 30
End: 2020-11-01
Payer: COMMERCIAL

## 2020-11-01 ENCOUNTER — HOSPITAL ENCOUNTER (INPATIENT)
Facility: HOSPITAL | Age: 30
LOS: 2 days | Discharge: HOME | End: 2020-11-03
Attending: OBSTETRICS & GYNECOLOGY | Admitting: OBSTETRICS & GYNECOLOGY
Payer: COMMERCIAL

## 2020-11-01 DIAGNOSIS — Z3A.38 38 WEEKS GESTATION OF PREGNANCY: ICD-10-CM

## 2020-11-01 LAB
ABO GROUP BLD: NORMAL
BASE EXCESS BLDCOA CALC-SCNC: -2.7 MMOL/L (ref 3–11)
BASE EXCESS BLDCOV CALC-SCNC: -1.5 MMOL/L (ref 1–9)
BASOPHILS # BLD AUTO: 0.06 THOUSANDS/ΜL (ref 0–0.1)
BASOPHILS NFR BLD AUTO: 1 % (ref 0–1)
BLD GP AB SCN SERPL QL: NEGATIVE
EOSINOPHIL # BLD AUTO: 0.13 THOUSAND/ΜL (ref 0–0.61)
EOSINOPHIL NFR BLD AUTO: 1 % (ref 0–6)
ERYTHROCYTE [DISTWIDTH] IN BLOOD BY AUTOMATED COUNT: 13.5 % (ref 11.6–15.1)
HCO3 BLDCOA-SCNC: 26 MMOL/L (ref 17.3–27.3)
HCO3 BLDCOV-SCNC: 25.5 MMOL/L (ref 12.2–28.6)
HCT VFR BLD AUTO: 42 % (ref 34.8–46.1)
HGB BLD-MCNC: 13.2 G/DL (ref 11.5–15.4)
IMM GRANULOCYTES # BLD AUTO: 0.16 THOUSAND/UL (ref 0–0.2)
IMM GRANULOCYTES NFR BLD AUTO: 2 % (ref 0–2)
LYMPHOCYTES # BLD AUTO: 2.11 THOUSANDS/ΜL (ref 0.6–4.47)
LYMPHOCYTES NFR BLD AUTO: 22 % (ref 14–44)
MCH RBC QN AUTO: 27.9 PG (ref 26.8–34.3)
MCHC RBC AUTO-ENTMCNC: 31.4 G/DL (ref 31.4–37.4)
MCV RBC AUTO: 89 FL (ref 82–98)
MONOCYTES # BLD AUTO: 0.74 THOUSAND/ΜL (ref 0.17–1.22)
MONOCYTES NFR BLD AUTO: 8 % (ref 4–12)
NEUTROPHILS # BLD AUTO: 6.42 THOUSANDS/ΜL (ref 1.85–7.62)
NEUTS SEG NFR BLD AUTO: 66 % (ref 43–75)
NRBC BLD AUTO-RTO: 0 /100 WBCS
O2 CT VFR BLDCOA CALC: 3.2 ML/DL
OXYHGB MFR BLDCOA: 17.3 %
OXYHGB MFR BLDCOV: 57.6 %
PCO2 BLDCOA: 63.1 MM[HG] (ref 30–60)
PCO2 BLDCOV: 52 MM HG (ref 27–43)
PH BLDCOA: 7.23 [PH] (ref 7.23–7.43)
PH BLDCOV: 7.31 [PH] (ref 7.19–7.49)
PLATELET # BLD AUTO: 261 THOUSANDS/UL (ref 149–390)
PMV BLD AUTO: 10.5 FL (ref 8.9–12.7)
PO2 BLDCOA: 12.9 MM HG (ref 5–25)
PO2 BLDCOV: 24.6 MM HG (ref 15–45)
RBC # BLD AUTO: 4.73 MILLION/UL (ref 3.81–5.12)
RH BLD: POSITIVE
SAO2 % BLDCOV: 11.6 ML/DL
SPECIMEN EXPIRATION DATE: NORMAL
WBC # BLD AUTO: 9.62 THOUSAND/UL (ref 4.31–10.16)

## 2020-11-01 PROCEDURE — 82805 BLOOD GASES W/O2 SATURATION: CPT | Performed by: OBSTETRICS & GYNECOLOGY

## 2020-11-01 PROCEDURE — 86923 COMPATIBILITY TEST ELECTRIC: CPT

## 2020-11-01 PROCEDURE — 3E033VJ INTRODUCTION OF OTHER HORMONE INTO PERIPHERAL VEIN, PERCUTANEOUS APPROACH: ICD-10-PCS | Performed by: OBSTETRICS & GYNECOLOGY

## 2020-11-01 PROCEDURE — 88307 TISSUE EXAM BY PATHOLOGIST: CPT | Performed by: PATHOLOGY

## 2020-11-01 PROCEDURE — 4A1HXCZ MONITORING OF PRODUCTS OF CONCEPTION, CARDIAC RATE, EXTERNAL APPROACH: ICD-10-PCS | Performed by: OBSTETRICS & GYNECOLOGY

## 2020-11-01 PROCEDURE — 86900 BLOOD TYPING SEROLOGIC ABO: CPT | Performed by: OBSTETRICS & GYNECOLOGY

## 2020-11-01 PROCEDURE — 86901 BLOOD TYPING SEROLOGIC RH(D): CPT | Performed by: OBSTETRICS & GYNECOLOGY

## 2020-11-01 PROCEDURE — 10907ZC DRAINAGE OF AMNIOTIC FLUID, THERAPEUTIC FROM PRODUCTS OF CONCEPTION, VIA NATURAL OR ARTIFICIAL OPENING: ICD-10-PCS | Performed by: OBSTETRICS & GYNECOLOGY

## 2020-11-01 PROCEDURE — 85025 COMPLETE CBC W/AUTO DIFF WBC: CPT | Performed by: OBSTETRICS & GYNECOLOGY

## 2020-11-01 PROCEDURE — 86850 RBC ANTIBODY SCREEN: CPT | Performed by: OBSTETRICS & GYNECOLOGY

## 2020-11-01 PROCEDURE — 59400 OBSTETRICAL CARE: CPT | Performed by: OBSTETRICS & GYNECOLOGY

## 2020-11-01 PROCEDURE — 86592 SYPHILIS TEST NON-TREP QUAL: CPT | Performed by: OBSTETRICS & GYNECOLOGY

## 2020-11-01 PROCEDURE — NC001 PR NO CHARGE: Performed by: OBSTETRICS & GYNECOLOGY

## 2020-11-01 RX ORDER — ONDANSETRON 2 MG/ML
4 INJECTION INTRAMUSCULAR; INTRAVENOUS EVERY 8 HOURS PRN
Status: DISCONTINUED | OUTPATIENT
Start: 2020-11-01 | End: 2020-11-04 | Stop reason: HOSPADM

## 2020-11-01 RX ORDER — CALCIUM CARBONATE 200(500)MG
1000 TABLET,CHEWABLE ORAL DAILY PRN
Status: DISCONTINUED | OUTPATIENT
Start: 2020-11-01 | End: 2020-11-04 | Stop reason: HOSPADM

## 2020-11-01 RX ORDER — DIAPER,BRIEF,INFANT-TODD,DISP
1 EACH MISCELLANEOUS 4 TIMES DAILY PRN
Status: DISCONTINUED | OUTPATIENT
Start: 2020-11-01 | End: 2020-11-04 | Stop reason: HOSPADM

## 2020-11-01 RX ORDER — OXYTOCIN/RINGER'S LACTATE 30/500 ML
250 PLASTIC BAG, INJECTION (ML) INTRAVENOUS
Status: DISCONTINUED | OUTPATIENT
Start: 2020-11-01 | End: 2020-11-04 | Stop reason: HOSPADM

## 2020-11-01 RX ORDER — METHYLERGONOVINE MALEATE 0.2 MG/ML
INJECTION INTRAVENOUS
Status: DISCONTINUED
Start: 2020-11-01 | End: 2020-11-01 | Stop reason: WASHOUT

## 2020-11-01 RX ORDER — DOCUSATE SODIUM 100 MG/1
100 CAPSULE, LIQUID FILLED ORAL 2 TIMES DAILY
Status: DISCONTINUED | OUTPATIENT
Start: 2020-11-02 | End: 2020-11-04 | Stop reason: HOSPADM

## 2020-11-01 RX ORDER — ROPIVACAINE HYDROCHLORIDE 2 MG/ML
INJECTION, SOLUTION EPIDURAL; INFILTRATION; PERINEURAL CONTINUOUS PRN
Status: DISCONTINUED | OUTPATIENT
Start: 2020-11-01 | End: 2020-11-01 | Stop reason: HOSPADM

## 2020-11-01 RX ORDER — ONDANSETRON 2 MG/ML
4 INJECTION INTRAMUSCULAR; INTRAVENOUS EVERY 6 HOURS PRN
Status: DISCONTINUED | OUTPATIENT
Start: 2020-11-01 | End: 2020-11-01

## 2020-11-01 RX ORDER — EPHEDRINE SULFATE 50 MG/ML
INJECTION INTRAVENOUS AS NEEDED
Status: DISCONTINUED | OUTPATIENT
Start: 2020-11-01 | End: 2020-11-01 | Stop reason: HOSPADM

## 2020-11-01 RX ORDER — ACETAMINOPHEN 325 MG/1
650 TABLET ORAL EVERY 6 HOURS PRN
Status: DISCONTINUED | OUTPATIENT
Start: 2020-11-01 | End: 2020-11-04 | Stop reason: HOSPADM

## 2020-11-01 RX ORDER — OXYCODONE HYDROCHLORIDE AND ACETAMINOPHEN 5; 325 MG/1; MG/1
1 TABLET ORAL EVERY 4 HOURS PRN
Status: DISCONTINUED | OUTPATIENT
Start: 2020-11-01 | End: 2020-11-04 | Stop reason: HOSPADM

## 2020-11-01 RX ORDER — OXYTOCIN/RINGER'S LACTATE 30/500 ML
1-30 PLASTIC BAG, INJECTION (ML) INTRAVENOUS
Status: DISCONTINUED | OUTPATIENT
Start: 2020-11-01 | End: 2020-11-01

## 2020-11-01 RX ORDER — IBUPROFEN 600 MG/1
600 TABLET ORAL EVERY 6 HOURS PRN
Status: DISCONTINUED | OUTPATIENT
Start: 2020-11-01 | End: 2020-11-04 | Stop reason: HOSPADM

## 2020-11-01 RX ORDER — LIDOCAINE HYDROCHLORIDE AND EPINEPHRINE 15; 5 MG/ML; UG/ML
INJECTION, SOLUTION EPIDURAL AS NEEDED
Status: DISCONTINUED | OUTPATIENT
Start: 2020-11-01 | End: 2020-11-01 | Stop reason: HOSPADM

## 2020-11-01 RX ORDER — ROPIVACAINE HYDROCHLORIDE 2 MG/ML
INJECTION, SOLUTION EPIDURAL; INFILTRATION; PERINEURAL AS NEEDED
Status: DISCONTINUED | OUTPATIENT
Start: 2020-11-01 | End: 2020-11-01 | Stop reason: HOSPADM

## 2020-11-01 RX ORDER — OXYCODONE HYDROCHLORIDE AND ACETAMINOPHEN 5; 325 MG/1; MG/1
2 TABLET ORAL EVERY 4 HOURS PRN
Status: DISCONTINUED | OUTPATIENT
Start: 2020-11-01 | End: 2020-11-04 | Stop reason: HOSPADM

## 2020-11-01 RX ORDER — CARBOPROST TROMETHAMINE 250 UG/ML
INJECTION, SOLUTION INTRAMUSCULAR
Status: DISCONTINUED
Start: 2020-11-01 | End: 2020-11-01 | Stop reason: WASHOUT

## 2020-11-01 RX ORDER — OXYTOCIN/RINGER'S LACTATE 30/500 ML
62.5 PLASTIC BAG, INJECTION (ML) INTRAVENOUS CONTINUOUS
Status: ACTIVE | OUTPATIENT
Start: 2020-11-01 | End: 2020-11-02

## 2020-11-01 RX ORDER — ROPIVACAINE HYDROCHLORIDE 2 MG/ML
INJECTION, SOLUTION EPIDURAL; INFILTRATION; PERINEURAL
Status: COMPLETED
Start: 2020-11-01 | End: 2020-11-01

## 2020-11-01 RX ORDER — DIPHENHYDRAMINE HCL 25 MG
25 TABLET ORAL EVERY 6 HOURS PRN
Status: DISCONTINUED | OUTPATIENT
Start: 2020-11-01 | End: 2020-11-04 | Stop reason: HOSPADM

## 2020-11-01 RX ORDER — SODIUM CHLORIDE, SODIUM LACTATE, POTASSIUM CHLORIDE, CALCIUM CHLORIDE 600; 310; 30; 20 MG/100ML; MG/100ML; MG/100ML; MG/100ML
125 INJECTION, SOLUTION INTRAVENOUS CONTINUOUS
Status: DISCONTINUED | OUTPATIENT
Start: 2020-11-01 | End: 2020-11-01

## 2020-11-01 RX ADMIN — EPHEDRINE SULFATE 10 MG: 50 INJECTION, SOLUTION INTRAVENOUS at 12:37

## 2020-11-01 RX ADMIN — Medication 250 MILLI-UNITS/MIN: at 21:52

## 2020-11-01 RX ADMIN — FAMOTIDINE 20 MG: 10 INJECTION, SOLUTION INTRAVENOUS at 16:37

## 2020-11-01 RX ADMIN — SODIUM CHLORIDE, SODIUM LACTATE, POTASSIUM CHLORIDE, AND CALCIUM CHLORIDE 125 ML/HR: .6; .31; .03; .02 INJECTION, SOLUTION INTRAVENOUS at 10:46

## 2020-11-01 RX ADMIN — SODIUM CHLORIDE, SODIUM LACTATE, POTASSIUM CHLORIDE, AND CALCIUM CHLORIDE 125 ML/HR: .6; .31; .03; .02 INJECTION, SOLUTION INTRAVENOUS at 19:37

## 2020-11-01 RX ADMIN — ACETAMINOPHEN 650 MG: 325 TABLET ORAL at 23:06

## 2020-11-01 RX ADMIN — ROPIVACAINE HYDROCHLORIDE 10 ML: 2 INJECTION, SOLUTION EPIDURAL; INFILTRATION at 12:09

## 2020-11-01 RX ADMIN — SODIUM CHLORIDE, SODIUM LACTATE, POTASSIUM CHLORIDE, AND CALCIUM CHLORIDE 999 ML/HR: .6; .31; .03; .02 INJECTION, SOLUTION INTRAVENOUS at 09:40

## 2020-11-01 RX ADMIN — Medication 62.5 MILLI-UNITS/MIN: at 23:30

## 2020-11-01 RX ADMIN — ROPIVACAINE HYDROCHLORIDE: 2 INJECTION, SOLUTION EPIDURAL; INFILTRATION at 21:15

## 2020-11-01 RX ADMIN — LIDOCAINE HYDROCHLORIDE AND EPINEPHRINE 3 ML: 15; 5 INJECTION, SOLUTION EPIDURAL at 12:01

## 2020-11-01 RX ADMIN — ROPIVACAINE HYDROCHLORIDE 10 ML/HR: 2 INJECTION, SOLUTION EPIDURAL; INFILTRATION at 12:14

## 2020-11-01 RX ADMIN — ONDANSETRON 4 MG: 2 INJECTION INTRAMUSCULAR; INTRAVENOUS at 15:07

## 2020-11-01 RX ADMIN — Medication 2 MILLI-UNITS/MIN: at 12:17

## 2020-11-01 RX ADMIN — IBUPROFEN 600 MG: 600 TABLET, FILM COATED ORAL at 23:05

## 2020-11-02 LAB — RPR SER QL: NORMAL

## 2020-11-02 PROCEDURE — 99024 POSTOP FOLLOW-UP VISIT: CPT | Performed by: OBSTETRICS & GYNECOLOGY

## 2020-11-02 RX ADMIN — IBUPROFEN 600 MG: 600 TABLET, FILM COATED ORAL at 17:51

## 2020-11-02 RX ADMIN — OXYCODONE HYDROCHLORIDE AND ACETAMINOPHEN 1 TABLET: 5; 325 TABLET ORAL at 20:53

## 2020-11-02 RX ADMIN — ACETAMINOPHEN 650 MG: 325 TABLET ORAL at 08:57

## 2020-11-02 RX ADMIN — IBUPROFEN 600 MG: 600 TABLET, FILM COATED ORAL at 12:09

## 2020-11-02 RX ADMIN — DOCUSATE SODIUM 100 MG: 100 CAPSULE, LIQUID FILLED ORAL at 17:51

## 2020-11-02 RX ADMIN — SERTRALINE HYDROCHLORIDE 50 MG: 50 TABLET ORAL at 09:40

## 2020-11-02 RX ADMIN — IBUPROFEN 600 MG: 600 TABLET, FILM COATED ORAL at 06:06

## 2020-11-02 RX ADMIN — OXYCODONE HYDROCHLORIDE AND ACETAMINOPHEN 1 TABLET: 5; 325 TABLET ORAL at 13:11

## 2020-11-02 RX ADMIN — DOCUSATE SODIUM 100 MG: 100 CAPSULE, LIQUID FILLED ORAL at 08:57

## 2020-11-03 VITALS
OXYGEN SATURATION: 100 % | HEIGHT: 64 IN | DIASTOLIC BLOOD PRESSURE: 81 MMHG | HEART RATE: 68 BPM | RESPIRATION RATE: 18 BRPM | SYSTOLIC BLOOD PRESSURE: 124 MMHG | TEMPERATURE: 97.5 F | WEIGHT: 180 LBS | BODY MASS INDEX: 30.73 KG/M2

## 2020-11-03 PROCEDURE — 99024 POSTOP FOLLOW-UP VISIT: CPT | Performed by: OBSTETRICS & GYNECOLOGY

## 2020-11-03 RX ORDER — IBUPROFEN 600 MG/1
600 TABLET ORAL EVERY 6 HOURS PRN
Qty: 30 TABLET | Refills: 0 | Status: SHIPPED | OUTPATIENT
Start: 2020-11-03

## 2020-11-03 RX ADMIN — IBUPROFEN 600 MG: 600 TABLET, FILM COATED ORAL at 08:16

## 2020-11-03 RX ADMIN — IBUPROFEN 600 MG: 600 TABLET, FILM COATED ORAL at 01:19

## 2020-11-03 RX ADMIN — DOCUSATE SODIUM 100 MG: 100 CAPSULE, LIQUID FILLED ORAL at 08:17

## 2020-11-03 RX ADMIN — IBUPROFEN 600 MG: 600 TABLET, FILM COATED ORAL at 20:57

## 2020-11-03 RX ADMIN — ACETAMINOPHEN 650 MG: 325 TABLET ORAL at 08:17

## 2020-11-03 RX ADMIN — SERTRALINE HYDROCHLORIDE 50 MG: 50 TABLET ORAL at 08:17

## 2020-11-03 RX ADMIN — IBUPROFEN 600 MG: 600 TABLET, FILM COATED ORAL at 14:18

## 2020-11-03 RX ADMIN — DOCUSATE SODIUM 100 MG: 100 CAPSULE, LIQUID FILLED ORAL at 18:20

## 2020-11-05 LAB
ABO GROUP BLD BPU: NORMAL
ABO GROUP BLD BPU: NORMAL
BPU ID: NORMAL
BPU ID: NORMAL
CROSSMATCH: NORMAL
CROSSMATCH: NORMAL
UNIT DISPENSE STATUS: NORMAL
UNIT DISPENSE STATUS: NORMAL
UNIT PRODUCT CODE: NORMAL
UNIT PRODUCT CODE: NORMAL
UNIT RH: NORMAL
UNIT RH: NORMAL

## 2021-01-07 ENCOUNTER — POSTPARTUM VISIT (OUTPATIENT)
Dept: OBGYN CLINIC | Facility: CLINIC | Age: 31
End: 2021-01-07

## 2021-01-07 VITALS — DIASTOLIC BLOOD PRESSURE: 60 MMHG | BODY MASS INDEX: 29.78 KG/M2 | SYSTOLIC BLOOD PRESSURE: 110 MMHG | WEIGHT: 170.8 LBS

## 2021-01-07 DIAGNOSIS — Z30.41 ENCOUNTER FOR SURVEILLANCE OF CONTRACEPTIVE PILLS: ICD-10-CM

## 2021-01-07 DIAGNOSIS — F41.9 ANXIETY: ICD-10-CM

## 2021-01-07 DIAGNOSIS — O99.340 DEPRESSION AFFECTING PREGNANCY: ICD-10-CM

## 2021-01-07 DIAGNOSIS — F32.A DEPRESSION AFFECTING PREGNANCY: ICD-10-CM

## 2021-01-07 PROBLEM — O43.213 PLACENTA ACCRETA IN THIRD TRIMESTER: Status: RESOLVED | Noted: 2019-04-29 | Resolved: 2021-01-07

## 2021-01-07 PROBLEM — O43.93 DISORDER OF PLACENTA IN THIRD TRIMESTER: Status: RESOLVED | Noted: 2020-09-25 | Resolved: 2021-01-07

## 2021-01-07 PROBLEM — Z34.93 THIRD TRIMESTER PREGNANCY: Status: RESOLVED | Noted: 2019-01-03 | Resolved: 2021-01-07

## 2021-01-07 PROBLEM — Z3A.38 38 WEEKS GESTATION OF PREGNANCY: Status: RESOLVED | Noted: 2020-05-12 | Resolved: 2021-01-07

## 2021-01-07 PROCEDURE — 99024 POSTOP FOLLOW-UP VISIT: CPT | Performed by: OBSTETRICS & GYNECOLOGY

## 2021-01-07 RX ORDER — ACETAMINOPHEN AND CODEINE PHOSPHATE 120; 12 MG/5ML; MG/5ML
1 SOLUTION ORAL DAILY
Qty: 90 TABLET | Refills: 3 | Status: SHIPPED | OUTPATIENT
Start: 2021-01-07 | End: 2022-01-28

## 2021-01-07 RX ORDER — MAGNESIUM 30 MG
500 TABLET ORAL DAILY
COMMUNITY

## 2021-01-07 RX ORDER — ALPRAZOLAM 0.25 MG/1
0.25 TABLET ORAL
Qty: 15 TABLET | Refills: 0 | Status: SHIPPED | OUTPATIENT
Start: 2021-01-07

## 2021-01-07 NOTE — PROGRESS NOTES
Postpartum Visit   [facility]  [address], [address], [place], [place]    Assessment/Plan:  Erich Aquino is a 26y o  year old [id number] who presents for postpartum visit  Routine Postpartum Care  1  Normal postpartum exam  2  Contraception: oral progesterone-only contraceptive  3  Depression Screen: 5  4  Feeding: breast  5  Cervical cancer screening Up to Date, next due   6  Follow up in: 1 year or as needed  Additional Problems: There are no diagnoses linked to this encounter  Subjective:     CC: Postpartum visit    Lexus Kilgore is a 26 y o  y o  female [id number] who presents for a postpartum visit  She is 8 weeks postpartum following a spontaneous vaginal delivery on 2020 at 38 2 weeks  Outcome: spontaneous vaginal delivery  Anesthesia: epidural  Postpartum course has been uncomplicated  Baby's course has been uncomplicated  Emilee Barakat had right broken clavicle that has healed  Baby is feeding by breast      Patient doing well on Zoloft  Would like to continue  States when she goes multiple nights without sleep, she'll have an exacerbation of anxiety  Has used Xanax sparingly in the past with relief  Bleeding no bleeding  Bowel function is normal  Bladder function is normal  Patient is sexually active  Contraception method is oral progesterone-only contraceptive  Postpartum depression screening: negative  The following portions of the patient's history were reviewed and updated as appropriate: allergies, current medications, past family history, past medical history, obstetric history, gynecologic history, past social history, past surgical history and problem list       Objective:  /60   Wt 77 5 kg (170 lb 12 8 oz)   BMI 29 78 kg/m²   Pregravid Weight/BMI: No episode found (BMI Could not be calculated)  Current Weight: 77 5 kg (170 lb 12 8 oz)   Total Weight Gain: Not found     Pre-Deny Vitals      Most Recent Value   Prenatal Assessment Prenatal Vitals   Blood Pressure  110/60   Weight - Scale  77 5 kg (170 lb 12 8 oz)   Urine Albumin/Glucose   Dilation/Effacement/Station   Vaginal Drainage   Edema           General: Well appearing, no distress  Mood and affect: Appropriate  Respiratory: Unlabored breathing  Clear to auscultate  Cardiovascular: Regular rate and rhythm  Abdomen: Soft, nontender  Vulva: no masses/lesions  Vagina: no bleeding  Urethra: normal meatus  Cervix: no bleeding, discharge  Uterus: mobile, nontender  Adnexa: nonpalpable   Extremities: Warm and well perfused  Non tender

## 2021-01-19 ENCOUNTER — TELEPHONE (OUTPATIENT)
Dept: NEUROLOGY | Facility: CLINIC | Age: 31
End: 2021-01-19

## 2021-01-19 NOTE — TELEPHONE ENCOUNTER
Did not contact patient during 2 week reminder calls, as appt was just scheduled on 1/8   Will contact patient to complete COVID screening during 2 day confirmation calls 
No

## 2021-02-01 ENCOUNTER — TELEPHONE (OUTPATIENT)
Dept: NEUROLOGY | Facility: CLINIC | Age: 31
End: 2021-02-01
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
## 2021-02-01 NOTE — TELEPHONE ENCOUNTER
Left VM for patient offering conversion of 2/2, 1:00 appointment with Melita Hussein to a virtual visit  Provided office phone number and asked patient to call back to discuss options

## 2021-02-03 ENCOUNTER — TELEMEDICINE (OUTPATIENT)
Dept: NEUROLOGY | Facility: CLINIC | Age: 31
End: 2021-02-03
Payer: COMMERCIAL

## 2021-02-03 ENCOUNTER — TELEPHONE (OUTPATIENT)
Dept: NEUROLOGY | Facility: CLINIC | Age: 31
End: 2021-02-03

## 2021-02-03 DIAGNOSIS — F44.7 FUNCTIONAL NEUROLOGICAL SYMPTOM DISORDER WITH MIXED SYMPTOMS: ICD-10-CM

## 2021-02-03 DIAGNOSIS — G44.329 CHRONIC POST-TRAUMATIC HEADACHE, NOT INTRACTABLE: ICD-10-CM

## 2021-02-03 DIAGNOSIS — F43.10 PTSD (POST-TRAUMATIC STRESS DISORDER): ICD-10-CM

## 2021-02-03 DIAGNOSIS — G43.009 MIGRAINE WITHOUT AURA AND WITHOUT STATUS MIGRAINOSUS, NOT INTRACTABLE: Primary | ICD-10-CM

## 2021-02-03 DIAGNOSIS — F43.22 PERSISTENT ADJUSTMENT DISORDER WITH ANXIETY: ICD-10-CM

## 2021-02-03 PROCEDURE — 99215 OFFICE O/P EST HI 40 MIN: CPT | Performed by: PSYCHIATRY & NEUROLOGY

## 2021-02-03 RX ORDER — BUTALBITAL, ACETAMINOPHEN AND CAFFEINE 50; 325; 40 MG/1; MG/1; MG/1
1 TABLET ORAL EVERY 4 HOURS PRN
COMMUNITY

## 2021-02-03 RX ORDER — RIBOFLAVIN (VITAMIN B2) 400 MG
400 TABLET ORAL DAILY
COMMUNITY

## 2021-02-03 RX ORDER — LANOLIN ALCOHOL/MO/W.PET/CERES
3 CREAM (GRAM) TOPICAL
COMMUNITY

## 2021-02-03 RX ORDER — RIZATRIPTAN BENZOATE 10 MG/1
10 TABLET ORAL ONCE AS NEEDED
Qty: 9 TABLET | Refills: 6 | Status: SHIPPED | OUTPATIENT
Start: 2021-02-03

## 2021-02-03 NOTE — PROGRESS NOTES
Review of Systems    {LimROS-complete:63664}      Review of Systems   Constitutional: Negative  Negative for appetite change and fever  HENT: Negative  Negative for hearing loss, tinnitus, trouble swallowing and voice change  Eyes: Negative  Negative for photophobia and pain  Respiratory: Negative  Negative for shortness of breath  Cardiovascular: Negative  Negative for palpitations  Gastrointestinal: Negative  Negative for nausea and vomiting  Endocrine: Negative  Negative for cold intolerance  Genitourinary: Negative  Negative for dysuria, frequency and urgency  Musculoskeletal: Negative  Negative for myalgias and neck pain  Skin: Negative  Negative for rash  Neurological: Positive for headaches (8-12 per month)  Negative for dizziness, tremors, seizures, syncope, facial asymmetry, speech difficulty, weakness, light-headedness and numbness  Hematological: Negative  Does not bruise/bleed easily  Psychiatric/Behavioral: Negative  Negative for confusion, hallucinations and sleep disturbance
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
## 2021-02-03 NOTE — TELEPHONE ENCOUNTER
Left VM for patient offering option of converting today's visit with Dr Brittney Adhikari to a video visit  Provided callback number for patient to call and discuss options

## 2021-02-03 NOTE — PATIENT INSTRUCTIONS
Try Headspace for meditation      The happiness advantage by Kumar Lazaro - positive psychology        I also recommend cognitive behavioral therapy: Techniques for retraining your brain by professor Deisy Wall    Consider the book:  "overcoming functional neurologic symptoms  A 5 areas approach"     Additional Testing or Referrals:     [x]?  Psychologist  -  cognitive behavioral therapy     Headache/migraine treatment:   Abortive medications (for immediate treatment of a headache): Ok to take ibuprofen or acetaminophen for headaches, but try to limit the amount and frequency that you are taking to avoid medication overuse/rebound headache   - no change to current meds, but try to take no more than 3 days per week      Maxalt (rizatriptan) 10mg tabs - take one at the onset of headache  May repeat one time after 1-2 hours if pain has not resolved  (Max 2 a day and 9 a month)     - some people may have some side effects from this medication, the other half typically do not  Common side effects include making you feel tired, palpitations, tingling or tightness of the face or chest   Most people report the side effects are nothing compared to their migraines and do not mind these  If you have intolerable side effects we will stop      Over the counter preventive supplements for headaches/migraines   (to take every day to help prevent headaches - not to take at the time of headache):  - Magnesium 400-500 mg daily  - Can occasionally cause stomach upset - if so try at night, with food or stop, rarely can cause diarrhea if so stop  - Riboflavin (Vitamin B2) 400mg daily - FYI B2 may make your urine bright/neon yellow - try online      Prescription preventive medications for headaches/migraines   (to take every day to help prevent headaches - not to take at the time of headache):  Hold off while breastfeeding     Sleep/headache prevention:  -  Melatonin - you may take 3 mg nightly for sleep   You should take this 1 hour prior to bedtime consistently every night for it to work  It works by gradually helping to adjust your sleep time over days to weeks, rather than immediately making you feel sleepy        Self-Monitoring:  - Headache calendar  Each day soy a number from 0-10 indicating if there was a headache and how bad it was   This can be used to monitor gradual improvement and is helpful to make medication adjustments      Lifestyle Recommendations:  - Maintain good sleep hygiene   Going to bed and waking up at consistent times, avoiding excessive daytime naps, avoiding caffeinated beverages in the evening, avoid excessive stimulation in the evening and generally using bed primarily for sleeping   One hour before bedtime would recommend turning lights down lower, decreasing your activity (may read quietly, listen to music at a low volume)  When you get into bed, should eliminate all technology (no texting, emailing, playing with your phone, iPad or tablet in bed)  - Maintain good hydration  Drink  2L of fluid a day (4 typical small water bottles)  - Maintain good nutrition  In particular don't skip meals and eat balanced meals regularly         Education and Follow-up  - Please contact us if any questions or concerns arise  Of course, try to protect yourself from head injuries, and if any new concerning symptoms or significant blow to the head or body go to the emergency department    - Follow up 1 year, sooner if needed

## 2021-02-04 ENCOUNTER — TELEPHONE (OUTPATIENT)
Dept: NEUROLOGY | Facility: CLINIC | Age: 31
End: 2021-02-04

## 2021-02-04 NOTE — TELEPHONE ENCOUNTER
2022 schedule not open yet, will call pt to schedule once schedule is available    Pt prefers CV location around 3Pm

## 2021-02-09 NOTE — TELEPHONE ENCOUNTER
Lm schedule OVL with Dr Elizabeth Santiago   Per previous notes patient needs to be seen by MS team  Established pt of Dr Merrill Burnette

## 2021-02-25 ENCOUNTER — TELEPHONE (OUTPATIENT)
Dept: NEUROLOGY | Facility: CLINIC | Age: 31
End: 2021-02-25

## 2021-02-25 NOTE — TELEPHONE ENCOUNTER
Received PAMELA request from Yamilet Wu - scanned into Media and forwarded to [facility]

## 2021-03-01 DIAGNOSIS — B00.9 RECURRENT HSV (HERPES SIMPLEX VIRUS): ICD-10-CM

## 2021-03-02 RX ORDER — ACYCLOVIR 400 MG/1
TABLET ORAL
Qty: 60 TABLET | Refills: 1 | OUTPATIENT
Start: 2021-03-02

## 2021-03-18 DIAGNOSIS — L60.0 INGROWN TOENAIL: Primary | ICD-10-CM

## 2021-03-18 RX ORDER — CEPHALEXIN 500 MG/1
500 CAPSULE ORAL EVERY 8 HOURS SCHEDULED
Qty: 21 CAPSULE | Refills: 0 | Status: SHIPPED | OUTPATIENT
Start: 2021-03-18 | End: 2021-03-25

## 2021-03-18 NOTE — TELEPHONE ENCOUNTER
Message left for patient -- need to know:    1 )  When was nail removed & who removed it? Nail Salon 2-3 weeks, just the corner  2 )  When is Podiatry appointment and with who? Will schedule here  3 )  What pharmacy if Dr Victorina Pinedo agrees to Rx? [facility] [address]  4 )  Confirm that she has NKDA? NKDA    Spoke with patient, has been using antibiotic cream and peroxide  Area is inflamed, swollen and sore to touch  Appointment provided for Tuesday 3/23/21 @ 0815 w/ Dr Victorina Pinedo and advised to do soaks until seen  Will have Dr Victorina Pinedo review for possible antibiotic Rx pending appointment  Discussed with Dr Victorina iPnedo -- will Rx Keflex 500 tid until seen -- to also be sure she is using Epsom Salt soaks  Any ?'s to call office      ----- Message from Keyshawn Moody sent at 3/18/2021  1:10 PM EDT -----  Regarding: Non-Urgent Medical Question  Contact: [phone]  Hi Dr Victorina Pinedo     I have an ingrown toe nail that was removed however its definitely infected  I am going to schedule with a podiatrist however could you prescribe me an antibiotic to start for the pain?      Thank you     Keturah Matias

## 2021-03-23 ENCOUNTER — OFFICE VISIT (OUTPATIENT)
Dept: FAMILY MEDICINE CLINIC | Facility: CLINIC | Age: 31
End: 2021-03-23
Payer: COMMERCIAL

## 2021-03-23 VITALS
HEIGHT: 64 IN | TEMPERATURE: 98.2 F | DIASTOLIC BLOOD PRESSURE: 74 MMHG | BODY MASS INDEX: 28.44 KG/M2 | HEART RATE: 76 BPM | SYSTOLIC BLOOD PRESSURE: 118 MMHG | WEIGHT: 166.6 LBS | OXYGEN SATURATION: 98 %

## 2021-03-23 DIAGNOSIS — L03.031 PARONYCHIA OF GREAT TOE, RIGHT: Primary | ICD-10-CM

## 2021-03-23 PROCEDURE — 99213 OFFICE O/P EST LOW 20 MIN: CPT | Performed by: FAMILY MEDICINE

## 2021-03-23 NOTE — PATIENT INSTRUCTIONS
Complete Keflex as already ordered  Soaks with Epson salts 5-10 minutes 3-4 times daily  If does not resolve would have you see Podiatry
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
## 2021-05-27 DIAGNOSIS — L25.5 DERMATITIS DUE TO PLANTS, INCLUDING POISON IVY, SUMAC, AND OAK: Primary | ICD-10-CM

## 2021-05-27 RX ORDER — PREDNISONE 20 MG/1
TABLET ORAL
Qty: 15 TABLET | Refills: 0 | Status: SHIPPED | OUTPATIENT
Start: 2021-05-27 | End: 2021-07-07

## 2021-05-27 NOTE — TELEPHONE ENCOUNTER
----- Message from Keyshawn Moody sent at 5/27/2021  8:20 AM EDT -----  Regarding: Non-Urgent Medical Question  Contact: [phone]  Hello     We went camping 3 weeks ago and I got some kind of poison ivy/sumac/oak  I've been highly allergic since childhood  I've used all of my usual home remedies and it's not improving and still spreading  At this point I usually need a steroid to get rid of it  Would you be able to prescribe a steroid please?     Thank you     Jennifer Kolb

## 2021-06-08 DIAGNOSIS — R53.83 FATIGUE, UNSPECIFIED TYPE: Primary | ICD-10-CM

## 2021-06-11 DIAGNOSIS — R53.83 FATIGUE, UNSPECIFIED TYPE: Primary | ICD-10-CM

## 2021-06-12 ENCOUNTER — LAB (OUTPATIENT)
Dept: LAB | Facility: HOSPITAL | Age: 31
End: 2021-06-12
Attending: OBSTETRICS & GYNECOLOGY
Payer: COMMERCIAL

## 2021-06-12 DIAGNOSIS — R53.83 FATIGUE, UNSPECIFIED TYPE: ICD-10-CM

## 2021-06-12 LAB
BASOPHILS # BLD AUTO: 0.07 THOUSANDS/ΜL (ref 0–0.1)
BASOPHILS NFR BLD AUTO: 1 % (ref 0–1)
EOSINOPHIL # BLD AUTO: 0.16 THOUSAND/ΜL (ref 0–0.61)
EOSINOPHIL NFR BLD AUTO: 2 % (ref 0–6)
ERYTHROCYTE [DISTWIDTH] IN BLOOD BY AUTOMATED COUNT: 13.2 % (ref 11.6–15.1)
FSH SERPL-ACNC: 4.4 MIU/ML
HCT VFR BLD AUTO: 42.7 % (ref 34.8–46.1)
HGB BLD-MCNC: 13.4 G/DL (ref 11.5–15.4)
IMM GRANULOCYTES # BLD AUTO: 0.04 THOUSAND/UL (ref 0–0.2)
IMM GRANULOCYTES NFR BLD AUTO: 1 % (ref 0–2)
LYMPHOCYTES # BLD AUTO: 3.03 THOUSANDS/ΜL (ref 0.6–4.47)
LYMPHOCYTES NFR BLD AUTO: 35 % (ref 14–44)
MCH RBC QN AUTO: 28 PG (ref 26.8–34.3)
MCHC RBC AUTO-ENTMCNC: 31.4 G/DL (ref 31.4–37.4)
MCV RBC AUTO: 89 FL (ref 82–98)
MONOCYTES # BLD AUTO: 0.65 THOUSAND/ΜL (ref 0.17–1.22)
MONOCYTES NFR BLD AUTO: 8 % (ref 4–12)
NEUTROPHILS # BLD AUTO: 4.72 THOUSANDS/ΜL (ref 1.85–7.62)
NEUTS SEG NFR BLD AUTO: 53 % (ref 43–75)
NRBC BLD AUTO-RTO: 0 /100 WBCS
PLATELET # BLD AUTO: 314 THOUSANDS/UL (ref 149–390)
PMV BLD AUTO: 9.7 FL (ref 8.9–12.7)
RBC # BLD AUTO: 4.78 MILLION/UL (ref 3.81–5.12)
TSH SERPL DL<=0.05 MIU/L-ACNC: 1.99 UIU/ML (ref 0.36–3.74)
WBC # BLD AUTO: 8.67 THOUSAND/UL (ref 4.31–10.16)

## 2021-06-12 PROCEDURE — 36415 COLL VENOUS BLD VENIPUNCTURE: CPT

## 2021-06-12 PROCEDURE — 84443 ASSAY THYROID STIM HORMONE: CPT

## 2021-06-12 PROCEDURE — 85025 COMPLETE CBC W/AUTO DIFF WBC: CPT

## 2021-06-12 PROCEDURE — 83001 ASSAY OF GONADOTROPIN (FSH): CPT

## 2021-06-25 ENCOUNTER — OFFICE VISIT (OUTPATIENT)
Dept: OBGYN CLINIC | Facility: MEDICAL CENTER | Age: 31
End: 2021-06-25
Payer: COMMERCIAL

## 2021-06-25 VITALS — WEIGHT: 171.4 LBS | BODY MASS INDEX: 29.89 KG/M2

## 2021-06-25 DIAGNOSIS — E34.9 HORMONAL DISORDER: Primary | ICD-10-CM

## 2021-06-25 PROCEDURE — 99213 OFFICE O/P EST LOW 20 MIN: CPT | Performed by: OBSTETRICS & GYNECOLOGY

## 2021-06-25 NOTE — PROGRESS NOTES
[facility] [address] [place] [address]    Assessment/Plan:  No problem-specific Assessment & Plan notes found for this encounter  Diagnoses and all orders for this visit:    Hormonal disorder    Subjective:   Lois Preston is a 33 y o  [id number] female  HPI: HPI  Patient presents for discussion of recent symptoms of feeling more hormonal than usual   Patient took a trip with , where she was not breastfeeding her son  She stated that when she came back she noticed more bloating, irritability, lightheaded at times, and the sensation that her cycle was about to start  Since her trip, her son has not wanted to breastfeed regularly and has been self weaning  She is also experiencing difficulty with weight loss and increased stress at home  We discussed ways of managing her symptoms  She occasionally uses Xanax to control her symptoms  We also discussed increasing her Zoloft to 100 mg  She also stopped micronor, which has been helping  ROS: Review of Systems   Constitutional: Negative  HENT: Negative  Eyes: Negative  Respiratory: Negative  Cardiovascular: Negative  Gastrointestinal: Negative  Genitourinary: Negative  Musculoskeletal: Negative  All other systems reviewed and are negative  PFSH: The following portions of the patient's history were reviewed and updated as appropriate: allergies, current medications, past family history, past medical history, obstetric history, gynecologic history, past social history, past surgical history and problem list        Objective: Wt 77 7 kg (171 lb 6 4 oz)   BMI 29 89 kg/m²    Physical Exam  Vitals reviewed  Constitutional:       Appearance: Normal appearance  Cardiovascular:      Rate and Rhythm: Normal rate  Pulmonary:      Effort: Pulmonary effort is normal  No respiratory distress  Neurological:      Mental Status: She is alert     Psychiatric:         Mood and Affect: Mood normal          Behavior: Behavior normal

## 2021-07-07 ENCOUNTER — TELEPHONE (OUTPATIENT)
Dept: NEUROLOGY | Facility: CLINIC | Age: 31
End: 2021-07-07

## 2021-07-07 DIAGNOSIS — G43.019 INTRACTABLE MIGRAINE WITHOUT AURA AND WITHOUT STATUS MIGRAINOSUS: Primary | ICD-10-CM

## 2021-07-07 RX ORDER — DEXAMETHASONE 2 MG/1
2 TABLET ORAL
Qty: 5 TABLET | Refills: 0 | Status: SHIPPED | OUTPATIENT
Start: 2021-07-07 | End: 2021-07-12

## 2021-07-07 NOTE — TELEPHONE ENCOUNTER
Merline Ramesh  to Rod Sim MD        7/7/21 9:40 AM  Hi Dr Rosa Maria Magana      I'm having issues with cluster migraines going on a stretch of 3 weeks that I've taking all PRN Meds daily with minimal relief  Headaches pain 6 and above and migraines mixed   I'm going to schedule a virtual visit with you to discuss options however is there anything you can prescribe to help break this cluster?      I am NOT breastfeeding anymore       Thank you     Odette Jimenez

## 2021-07-07 NOTE — TELEPHONE ENCOUNTER
Pt called regarding mychart message  pt called and states that she has been having cluster migraines for about 3 weeks  she states that she does not have her children tonight and asking for something to help break this cluster  she is no longer breast feeding  took fioricet last, tired aleve, motrin, excedrin, xanax and nothing effective     going for a massage this afternoon  maxalt makes her heart race  Mag and b2,   melatonin 3mg hs  Has tried depakote-ineffective  Decadron-helped when given in migraine cocktail  Unsure if she ever took olanzapine  She has an appt scheduled with you on 7/21  If you are recommending a daily preventative she would like to speak to you at appt before she starts     fioricet typically works for her  Please advise on breaking this current cluster  [phone] to leave detailed message or can send mychart message

## 2021-07-07 NOTE — TELEPHONE ENCOUNTER
Recommend Decadron 2 mg daily with food for 5 days ideally in the morning  But could start today if suffering, just can make people a little hyper sometimes

## 2021-07-21 ENCOUNTER — TELEMEDICINE (OUTPATIENT)
Dept: NEUROLOGY | Facility: CLINIC | Age: 31
End: 2021-07-21
Payer: COMMERCIAL

## 2021-07-21 VITALS — HEIGHT: 64 IN | WEIGHT: 160 LBS | BODY MASS INDEX: 27.31 KG/M2

## 2021-07-21 DIAGNOSIS — G44.329 CHRONIC POST-TRAUMATIC HEADACHE, NOT INTRACTABLE: ICD-10-CM

## 2021-07-21 DIAGNOSIS — G43.009 MIGRAINE WITHOUT AURA AND WITHOUT STATUS MIGRAINOSUS, NOT INTRACTABLE: Primary | ICD-10-CM

## 2021-07-21 PROCEDURE — 99214 OFFICE O/P EST MOD 30 MIN: CPT | Performed by: PSYCHIATRY & NEUROLOGY

## 2021-07-21 RX ORDER — METOCLOPRAMIDE 10 MG/1
10 TABLET ORAL AS NEEDED
COMMUNITY
End: 2021-07-21

## 2021-07-21 RX ORDER — FREMANEZUMAB-VFRM 225 MG/1.5ML
INJECTION SUBCUTANEOUS
Qty: 1 ML | Refills: 11 | Status: SHIPPED | OUTPATIENT
Start: 2021-07-21 | End: 2022-07-29

## 2021-07-21 NOTE — PROGRESS NOTES
Review of Systems   Constitutional: Negative  Negative for appetite change and fever  HENT: Negative  Negative for hearing loss, tinnitus, trouble swallowing and voice change  Eyes: Positive for photophobia and pain  Respiratory: Negative  Negative for shortness of breath  Cardiovascular: Negative  Negative for palpitations  Gastrointestinal: Positive for nausea  Negative for vomiting  Endocrine: Negative  Negative for cold intolerance  Genitourinary: Negative  Negative for dysuria, frequency and urgency  Musculoskeletal: Positive for neck pain and neck stiffness  Negative for myalgias  Skin: Negative  Negative for rash  Neurological: Positive for headaches (daily)  Negative for dizziness, tremors, seizures, syncope, facial asymmetry, speech difficulty, weakness, light-headedness and numbness  Hematological: Negative  Does not bruise/bleed easily  Psychiatric/Behavioral: Negative  Negative for confusion, hallucinations and sleep disturbance
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
## 2021-08-27 ENCOUNTER — SOCIAL WORK (OUTPATIENT)
Dept: BEHAVIORAL/MENTAL HEALTH CLINIC | Facility: CLINIC | Age: 31
End: 2021-08-27
Payer: COMMERCIAL

## 2021-08-27 DIAGNOSIS — F41.9 ANXIETY: Primary | ICD-10-CM

## 2021-08-27 PROCEDURE — 90834 PSYTX W PT 45 MINUTES: CPT | Performed by: SOCIAL WORKER

## 2021-08-27 NOTE — PSYCH
Assessment/Plan:      Diagnoses and all orders for this visit:    Anxiety          Subjective:     Patient ID: Erna Tillman is a 33 y o  female  [facility] was counseled for 45 minutes from 2:00 - 2:45pm     Last seen in 2019  Had  and toddler and had another baby on  - Vaginal at 37 weeks  [place]  - "happiest baby on earth - healthy, happy "  When he was 10mos old, went on vacation with spouse - left baby first time - great trip for 4 days  Anxiety in past but felt depression this time  Felt hormonally related  On mini pill but stopped due to feelings  No birth control but trying natural family planning  Started to go away - taking Zoloft, 50 mg still  Stopped breastfeeding at about 8 moms as well - never an easy breastfeeder and he likes bottles  Feels homrones evening out now  Was waking with a feeling of dread - getting better since made appt - doing injections for migraines which are much better  3 kids under 4  Feels covid did not make her panic and managed well  Still seeing family safely  2nd oldest of 7 - puts pressure on self - "I shouldn't be postpartum/homronal "  Big changes  Rhenda Sor, 3 4yo - had speech delay - early intervention then went to  and IU/ which helped - improvements but struggles with expressive communication and sensory differences/auditory processing - tubes in ears and hearing better  Struggles with transitions  Started  5 days a week and 2 long days for more intervention  Sariah Abts is 2 4yo - started  2 days per week, funny, independent in many ways  Talks a lot but doesn't exchange/conversation wise - obsessed over "Are they talking?"   Feels anrvouse about family comparing kids with nieces - Shimon Quphil -  laid back - no concerns but supportive  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    Home since 2016 - per javy with [employer] and doing t-shirt work for inner city program - great cause and enjoys   still doing swing shift as  - long hours  Sleep is ok when kids sleeping - baby sleeping through night for a few months  Seen 4/15/19 - original note from that visit:  Crystal provided for 50 minutes  Baby, [address] born on 2/[birth year] at 43 weeks - vaginal birth but retained placenta requiring D&C after birth  Nikki Whitman, 21 month old son - he was hospitalized for RSV at 3 months old for 6 days at PICU - anxiety about this, hospitalized in PEDS room overnight for croup once  Both boys are healthy now although she worries about Nikki Whitman  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 [facility] [employer] [facility]  Now per javy Majano, work from home at [address] to give [employer] kids jobs -  Shirt orders for shops  Pt reports postpartum depresison after Nikki Whitman  ,  3yrs [employer],  for [employer] - swing shifts - shifts change/weekends - long hours - busy schedule  Together for ten yrs, found out pregnant week before wedding - lost first baby - lost baby at 5 weeks - had D&C  Difficult getting pregnancy after that - low progersterone - took orally but got pregnant - anxiety throughout pregnancy  From strict [employer] family - had not told family was pregnant until loss started  Was very sad/overhwelmed with loss - then started to talk about more after just ignoring it  Healed together and talked about it with  - did home workout MMA program and work out together - felt therapeutic  Parents  for 33yrs - 7 siblings - 1nd oldest - a lot of nieces/nephews - 8 born in one yr  All in [facility]/in between - pt in [facility]   from [place] - only see when they go there and visit   1 of 7 kids - he was youngest/twin  Pt and  want a big family  Type A personality - can work, work out, be organized - struggled with adjusting to birth of first child    At 7 weeks - started having intrusive thoughts - "something happening to son "  [place] terrified about it - spoke to  - went to doctor - started on Zoloft but anxiety was worse - same time son in ICU - took Xanax when in NICU - was afraid to take thing while breastfeeding and felt unable to cope at all  Met with Dr Houston People, psych at the time - ordered small dose for 3 months rarely PRN - have not taken since Socorro Alanis was very young - hid issues from everyone "It's fine "     first son until 7 months and breastfeeding Jacqueline Mcmillan now - no supplementing - baby gaining weight - 12lbs at one month check up      Triggers to anxiety: tons of people as well as chaos - taking Zoloft 50mg, on for over a year and a half - stopped taking first few months of pregnancy  Feels PTSD from previous experience of PPD - panic about getting PPD  Now wakes up with knot in throat at times - feels comes out of nowhere now - trying to prevent full blown PPD  Cosleep with baby - only way he'll sleep and sleeps better with him next to her, trust self as mom not anxious like first baby  No hx of depression/anxiety/self harm, denies suicidal/homicidal ideations past or present  Was always trying to be the best - as kid remembers telling mom she would have a stomach ache but thinks anxiety now  Family did not talk about problems - would not even tell mom she is going to therapy - family views as a weakness  Recently talked more openly about difficulties of motherhood with sisters which has helped     Educated pt about support, communication, PPD and prevention since hs only has slight likely normal anxiety right now    HPI    Review of Systems      Objective:     Physical Exam

## 2021-10-16 ENCOUNTER — HOSPITAL ENCOUNTER (EMERGENCY)
Facility: HOSPITAL | Age: 31
Discharge: HOME | End: 2021-10-16
Attending: EMERGENCY MEDICINE | Admitting: EMERGENCY MEDICINE
Payer: COMMERCIAL

## 2021-10-16 VITALS
HEART RATE: 77 BPM | DIASTOLIC BLOOD PRESSURE: 77 MMHG | TEMPERATURE: 97.3 F | OXYGEN SATURATION: 96 % | SYSTOLIC BLOOD PRESSURE: 135 MMHG | RESPIRATION RATE: 18 BRPM

## 2021-10-16 DIAGNOSIS — T63.441A ALLERGIC REACTION TO BEE STING: Primary | ICD-10-CM

## 2021-10-16 PROCEDURE — 99282 EMERGENCY DEPT VISIT SF MDM: CPT

## 2021-10-16 PROCEDURE — 99284 EMERGENCY DEPT VISIT MOD MDM: CPT | Performed by: EMERGENCY MEDICINE

## 2021-10-16 RX ORDER — CEPHALEXIN 500 MG/1
500 CAPSULE ORAL 4 TIMES DAILY
Qty: 28 CAPSULE | Refills: 0 | Status: SHIPPED | OUTPATIENT
Start: 2021-10-16 | End: 2021-10-23

## 2021-10-16 RX ORDER — DIAPER,BRIEF,INFANT-TODD,DISP
EACH MISCELLANEOUS 2 TIMES DAILY
Qty: 30 G | Refills: 0 | Status: SHIPPED | OUTPATIENT
Start: 2021-10-16 | End: 2022-01-28

## 2021-10-16 RX ORDER — HYDROXYZINE HYDROCHLORIDE 25 MG/1
25 TABLET, FILM COATED ORAL EVERY 6 HOURS PRN
Qty: 12 TABLET | Refills: 0 | Status: SHIPPED | OUTPATIENT
Start: 2021-10-16

## 2021-10-16 RX ORDER — METHYLPREDNISOLONE 4 MG/1
TABLET ORAL
Qty: 21 TABLET | Refills: 0 | Status: SHIPPED | OUTPATIENT
Start: 2021-10-16 | End: 2022-01-28

## 2021-10-16 RX ADMIN — DEXAMETHASONE 6 MG: 2 TABLET ORAL at 05:19

## 2021-10-18 ENCOUNTER — VBI (OUTPATIENT)
Dept: ADMINISTRATIVE | Facility: OTHER | Age: 31
End: 2021-10-18

## 2022-01-18 ENCOUNTER — TELEPHONE (OUTPATIENT)
Dept: OBGYN CLINIC | Facility: MEDICAL CENTER | Age: 32
End: 2022-01-18

## 2022-01-25 DIAGNOSIS — O99.340 DEPRESSION AFFECTING PREGNANCY: ICD-10-CM

## 2022-01-25 DIAGNOSIS — F32.A DEPRESSION AFFECTING PREGNANCY: ICD-10-CM

## 2022-01-28 ENCOUNTER — ANNUAL EXAM (OUTPATIENT)
Dept: OBGYN CLINIC | Facility: CLINIC | Age: 32
End: 2022-01-28
Payer: COMMERCIAL

## 2022-01-28 VITALS
HEIGHT: 64 IN | SYSTOLIC BLOOD PRESSURE: 100 MMHG | DIASTOLIC BLOOD PRESSURE: 75 MMHG | WEIGHT: 169 LBS | BODY MASS INDEX: 28.85 KG/M2

## 2022-01-28 DIAGNOSIS — Z86.19 H/O HERPES LABIALIS: ICD-10-CM

## 2022-01-28 DIAGNOSIS — Z01.419 ENCOUNTER FOR WELL WOMAN EXAM WITH ROUTINE GYNECOLOGICAL EXAM: Primary | ICD-10-CM

## 2022-01-28 PROCEDURE — G0145 SCR C/V CYTO,THINLAYER,RESCR: HCPCS | Performed by: OBSTETRICS & GYNECOLOGY

## 2022-01-28 PROCEDURE — 99395 PREV VISIT EST AGE 18-39: CPT | Performed by: OBSTETRICS & GYNECOLOGY

## 2022-01-28 RX ORDER — ACYCLOVIR 400 MG/1
400 TABLET ORAL 3 TIMES DAILY
Qty: 30 TABLET | Refills: 6 | Status: SHIPPED | OUTPATIENT
Start: 2022-01-28 | End: 2022-02-23

## 2022-01-28 NOTE — PROGRESS NOTES
[facility] [address], [address], [place], [place]    ASSESSMENT/PLAN: Imelda Lovett is a 33 y o  [id number] who presents for annual gynecologic exam     Encounter for routine gynecologic examination  - Routine well woman exam completed today  - Cervical Cancer Screening: Current ASCCP Guidelines reviewed  Last Pap: 01/13/2020  Pap done today  - Contraceptive counseling discussed  Current contraception: none     Additional problems addressed during this visit:  1  Encounter for well woman exam with routine gynecological exam  -     Liquid-based pap, screening        CC: Annual Gynecologic Examination    HPI: mIelda Lovett is a 33 y o  [id number] who presents for annual gynecologic examination  HPI  She reports  no new changes to her health  She reports no breast concerns  She gets regular periods  She has no vaginal discharge, vulvar or vaginal lesions, pelvic pain, or abnormal bleeding  She has no sexual health concerns and is currently sexually active with one male partner  She contracepts with nothing  The following portions of the patient's history were reviewed and updated as appropriate: allergies, current medications, past family history, past medical history, obstetric history, gynecologic history, past social history, past surgical history and problem list     Review of Systems   Constitutional: Negative  HENT: Negative  Eyes: Negative  Respiratory: Negative  Cardiovascular: Negative  Gastrointestinal: Negative  Genitourinary: Negative  Musculoskeletal: Negative  All other systems reviewed and are negative  Objective:  /75 (BP Location: Left arm, Patient Position: Sitting, Cuff Size: Adult)   Ht 5' 3 5" (1 613 m)   Wt 76 7 kg (169 lb)   LMP 01/16/2022   BMI 29 47 kg/m²    Physical Exam  Vitals reviewed  Constitutional:       General: She is not in acute distress  Appearance: She is well-developed     HENT:      Head: Normocephalic and atraumatic  Nose: Nose normal    Cardiovascular:      Rate and Rhythm: Normal rate  Pulmonary:      Effort: Pulmonary effort is normal  No respiratory distress  Chest:   Breasts: Breasts are symmetrical       Right: Normal  No mass, nipple discharge, skin change, tenderness, axillary adenopathy or supraclavicular adenopathy  Left: Normal  No mass, nipple discharge, skin change, tenderness, axillary adenopathy or supraclavicular adenopathy  Abdominal:      General: There is no distension  Palpations: Abdomen is soft  There is no mass  Tenderness: There is no abdominal tenderness  There is no guarding or rebound  Genitourinary:     General: Normal vulva  Exam position: Lithotomy position  Labia:         Right: No lesion  Left: No lesion  Urethra: No prolapse (urethral meatus normal)  Vagina: Normal  No vaginal discharge, erythema or bleeding  Cervix: Normal       Uterus: Normal        Adnexa: Right adnexa normal and left adnexa normal    Musculoskeletal:         General: Normal range of motion  Cervical back: Normal range of motion  Lymphadenopathy:      Upper Body:      Right upper body: No supraclavicular, axillary or pectoral adenopathy  Left upper body: No supraclavicular, axillary or pectoral adenopathy  Lower Body: No right inguinal adenopathy  No left inguinal adenopathy  Skin:     General: Skin is warm and dry  Neurological:      Mental Status: She is alert and oriented to person, place, and time  Psychiatric:         Behavior: Behavior normal          Thought Content:  Thought content normal          Judgment: Judgment normal

## 2022-02-03 LAB
LAB AP GYN PRIMARY INTERPRETATION: NORMAL
Lab: NORMAL

## 2022-02-09 ENCOUNTER — TELEPHONE (OUTPATIENT)
Dept: NEUROLOGY | Facility: CLINIC | Age: 32
End: 2022-02-09

## 2022-02-09 NOTE — TELEPHONE ENCOUNTER
Called and left a voicemail for patient - Please call back to confirm upcoming appointment with Dr Amrit Luna  Provided patient with apt date, time and location  Informed patient that check in is at least 15 minutes prior to apt time

## 2022-02-16 NOTE — TELEPHONE ENCOUNTER
Called and spoke to patient - confirmed upcoming appointment with Dr Tracy Valenzuela  Provided patient with apt date, time and location  Informed patient that check in is at least 15 minutes prior to apt time  PT REQUEST TO CHANGE TO VIRTUAL

## 2022-02-23 ENCOUNTER — TELEMEDICINE (OUTPATIENT)
Dept: NEUROLOGY | Facility: CLINIC | Age: 32
End: 2022-02-23
Payer: COMMERCIAL

## 2022-02-23 VITALS — HEIGHT: 64 IN | WEIGHT: 169 LBS | BODY MASS INDEX: 28.85 KG/M2

## 2022-02-23 DIAGNOSIS — G43.009 MIGRAINE WITHOUT AURA AND WITHOUT STATUS MIGRAINOSUS, NOT INTRACTABLE: Primary | ICD-10-CM

## 2022-02-23 PROCEDURE — 3008F BODY MASS INDEX DOCD: CPT | Performed by: PSYCHIATRY & NEUROLOGY

## 2022-02-23 PROCEDURE — 99214 OFFICE O/P EST MOD 30 MIN: CPT | Performed by: PSYCHIATRY & NEUROLOGY

## 2022-02-23 NOTE — PATIENT INSTRUCTIONS
Try Headspace for meditation      The happiness advantage by Janes Lazaro - positive psychology       Headache/migraine treatment:   Abortive medications (for immediate treatment of a headache): Ok to take ibuprofen or acetaminophen for headaches, but try to limit the amount and frequency that you are taking to avoid medication overuse/rebound headache   - no change to current meds, but try to take no more than 3 days per week      Prescription preventive medications for headaches/migraines   (to take every day to help prevent headaches - not to take at the time of headache):  Continue ajovy         Lifestyle Recommendations:  - Maintain good sleep hygiene   Going to bed and waking up at consistent times, avoiding excessive daytime naps, avoiding caffeinated beverages in the evening, avoid excessive stimulation in the evening and generally using bed primarily for sleeping   One hour before bedtime would recommend turning lights down lower, decreasing your activity (may read quietly, listen to music at a low volume)  When you get into bed, should eliminate all technology (no texting, emailing, playing with your phone, iPad or tablet in bed)  - Maintain good hydration  Drink  2L of fluid a day (4 typical small water bottles)  - Maintain good nutrition  In particular don't skip meals and eat balanced meals regularly         Education and Follow-up  - Please contact us if any questions or concerns arise  Of course, try to protect yourself from head injuries, and if any new concerning symptoms or significant blow to the head or body go to the emergency department    - Follow up 6 months, sooner if needed

## 2022-02-23 NOTE — PROGRESS NOTES
Review of Systems   Constitutional: Negative  Negative for appetite change and fever  HENT: Negative  Negative for hearing loss, tinnitus, trouble swallowing and voice change  Eyes: Negative  Negative for photophobia and pain  Respiratory: Negative  Negative for shortness of breath  Cardiovascular: Negative  Negative for palpitations  Gastrointestinal: Negative  Negative for nausea and vomiting  Endocrine: Negative  Negative for cold intolerance  Genitourinary: Negative  Negative for dysuria, frequency and urgency  Musculoskeletal: Negative  Negative for myalgias and neck pain  Skin: Negative  Negative for rash  Neurological: Positive for headaches (3-4 headaches per week)  Negative for dizziness, tremors, seizures, syncope, facial asymmetry, speech difficulty, weakness, light-headedness and numbness  Hematological: Negative  Does not bruise/bleed easily  Psychiatric/Behavioral: Negative  Negative for confusion, hallucinations and sleep disturbance

## 2022-06-18 NOTE — PROGRESS NOTES
Chief Complaint  Pt presents today for a repeat hcg  Active Problems    1  Amenorrhea (626 0) (N91 2)   2  Blighted ovum (631 8) (O02 0)   3  History of spontaneous  (V13 29) (Z87 59)   4  Positive urine pregnancy test (V72 42) (Z32 01)    Allergies    1  No Known Drug Allergies    2  No Known Environmental Allergies   3  No Known Food Allergies    Plan  History of spontaneous     · (1) HCG QUANT; Status:Active - Retrospective By Protocol Authorization;  Requested  for:[id number];     Signatures   Electronically signed by : MANNY Duong ; Feb 10 2017 10:35AM EST                       (Author)
Partially impaired: cannot see medication labels or newsprint, but can see obstacles in path, and the surrounding layout; can count fingers at arm's length

## 2022-07-29 DIAGNOSIS — G43.009 MIGRAINE WITHOUT AURA AND WITHOUT STATUS MIGRAINOSUS, NOT INTRACTABLE: ICD-10-CM

## 2022-07-29 RX ORDER — FREMANEZUMAB-VFRM 225 MG/1.5ML
INJECTION SUBCUTANEOUS
Qty: 1.5 ML | Refills: 11 | Status: SHIPPED | OUTPATIENT
Start: 2022-07-29

## 2022-08-24 ENCOUNTER — TELEPHONE (OUTPATIENT)
Dept: NEUROLOGY | Facility: CLINIC | Age: 32
End: 2022-08-24

## 2022-08-24 NOTE — TELEPHONE ENCOUNTER
Called and spoke to patient to confirm their upcoming appointment with Dr Wells Done  Informed patient about arriving in the [place] location 15 minutes prior to their appointment to get checked in and going over chart

## 2022-08-30 ENCOUNTER — TELEMEDICINE (OUTPATIENT)
Dept: NEUROLOGY | Facility: CLINIC | Age: 32
End: 2022-08-30
Payer: COMMERCIAL

## 2022-08-30 DIAGNOSIS — G43.009 MIGRAINE WITHOUT AURA AND WITHOUT STATUS MIGRAINOSUS, NOT INTRACTABLE: Primary | ICD-10-CM

## 2022-08-30 PROCEDURE — 99213 OFFICE O/P EST LOW 20 MIN: CPT | Performed by: PSYCHIATRY & NEUROLOGY
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
## 2022-08-30 NOTE — PATIENT INSTRUCTIONS
Headache/migraine treatment:   Abortive medications (for immediate treatment of a headache): Ok to take ibuprofen or acetaminophen for headaches, but try to limit the amount and frequency that you are taking to avoid medication overuse/rebound headache   - no change to current meds, but try to take no more than 3 days per week      Prescription preventive medications for headaches/migraines   (to take every day to help prevent headaches - not to take at the time of headache):  Continue ajovy         Lifestyle Recommendations:  - Maintain good sleep hygiene  Going to bed and waking up at consistent times, avoiding excessive daytime naps, avoiding caffeinated beverages in the evening, avoid excessive stimulation in the evening and generally using bed primarily for sleeping  One hour before bedtime would recommend turning lights down lower, decreasing your activity (may read quietly, listen to music at a low volume)  When you get into bed, should eliminate all technology (no texting, emailing, playing with your phone, iPad or tablet in bed)  - Maintain good hydration  Drink  2L of fluid a day (4 typical small water bottles)  - Maintain good nutrition  In particular don't skip meals and eat balanced meals regularly  Education and Follow-up  - Please contact us if any questions or concerns arise  Of course, try to protect yourself from head injuries, and if any new concerning symptoms or significant blow to the head or body go to the emergency department    - Follow up 6 months, sooner if needed

## 2022-12-22 ENCOUNTER — TELEPHONE (OUTPATIENT)
Dept: NEUROLOGY | Facility: CLINIC | Age: 32
End: 2022-12-22

## 2022-12-22 NOTE — TELEPHONE ENCOUNTER
Called and left message for patient informing them that unfortunately dr Terrie Hussein will be out of the office on 03/3/23 and we will have to reschedule her appointment and to please call the office so we can do that for her at her earliest convenience

## 2022-12-25 ENCOUNTER — HOSPITAL ENCOUNTER (EMERGENCY)
Facility: HOSPITAL | Age: 32
Discharge: HOME | End: 2022-12-25
Attending: EMERGENCY MEDICINE

## 2022-12-25 ENCOUNTER — APPOINTMENT (EMERGENCY)
Dept: CT IMAGING | Facility: HOSPITAL | Age: 32
End: 2022-12-25

## 2022-12-25 VITALS
BODY MASS INDEX: 24.8 KG/M2 | HEART RATE: 71 BPM | WEIGHT: 140 LBS | OXYGEN SATURATION: 99 % | DIASTOLIC BLOOD PRESSURE: 80 MMHG | HEIGHT: 63 IN | RESPIRATION RATE: 18 BRPM | TEMPERATURE: 98.3 F | SYSTOLIC BLOOD PRESSURE: 117 MMHG

## 2022-12-25 DIAGNOSIS — R10.9 ABDOMINAL PAIN: Primary | ICD-10-CM

## 2022-12-25 LAB
ALBUMIN SERPL BCP-MCNC: 4 G/DL (ref 3.5–5)
ALP SERPL-CCNC: 56 U/L (ref 46–116)
ALT SERPL W P-5'-P-CCNC: 39 U/L (ref 12–78)
ANION GAP SERPL CALCULATED.3IONS-SCNC: 5 MMOL/L (ref 4–13)
AST SERPL W P-5'-P-CCNC: 23 U/L (ref 5–45)
BASOPHILS # BLD AUTO: 0.06 THOUSANDS/ÂΜL (ref 0–0.1)
BASOPHILS NFR BLD AUTO: 1 % (ref 0–1)
BILIRUB SERPL-MCNC: 0.4 MG/DL (ref 0.2–1)
BILIRUB UR QL STRIP: NEGATIVE
BUN SERPL-MCNC: 11 MG/DL (ref 5–25)
CALCIUM SERPL-MCNC: 8.9 MG/DL (ref 8.3–10.1)
CHLORIDE SERPL-SCNC: 103 MMOL/L (ref 96–108)
CLARITY UR: CLEAR
CO2 SERPL-SCNC: 28 MMOL/L (ref 21–32)
COLOR UR: ABNORMAL
CREAT SERPL-MCNC: 0.76 MG/DL (ref 0.6–1.3)
EOSINOPHIL # BLD AUTO: 0.12 THOUSAND/ÂΜL (ref 0–0.61)
EOSINOPHIL NFR BLD AUTO: 2 % (ref 0–6)
ERYTHROCYTE [DISTWIDTH] IN BLOOD BY AUTOMATED COUNT: 13.3 % (ref 11.6–15.1)
EXT PREGNANCY TEST URINE: NEGATIVE
EXT. CONTROL: NORMAL
GFR SERPL CREATININE-BSD FRML MDRD: 102 ML/MIN/1.73SQ M
GLUCOSE SERPL-MCNC: 96 MG/DL (ref 65–140)
GLUCOSE UR STRIP-MCNC: NEGATIVE MG/DL
HCT VFR BLD AUTO: 41.7 % (ref 34.8–46.1)
HGB BLD-MCNC: 13.4 G/DL (ref 11.5–15.4)
HGB UR QL STRIP.AUTO: NEGATIVE
IMM GRANULOCYTES # BLD AUTO: 0.02 THOUSAND/UL (ref 0–0.2)
IMM GRANULOCYTES NFR BLD AUTO: 0 % (ref 0–2)
KETONES UR STRIP-MCNC: NEGATIVE MG/DL
LEUKOCYTE ESTERASE UR QL STRIP: NEGATIVE
LIPASE SERPL-CCNC: 75 U/L (ref 73–393)
LYMPHOCYTES # BLD AUTO: 2.54 THOUSANDS/ÂΜL (ref 0.6–4.47)
LYMPHOCYTES NFR BLD AUTO: 39 % (ref 14–44)
MCH RBC QN AUTO: 27.8 PG (ref 26.8–34.3)
MCHC RBC AUTO-ENTMCNC: 32.1 G/DL (ref 31.4–37.4)
MCV RBC AUTO: 87 FL (ref 82–98)
MONOCYTES # BLD AUTO: 0.46 THOUSAND/ÂΜL (ref 0.17–1.22)
MONOCYTES NFR BLD AUTO: 7 % (ref 4–12)
NEUTROPHILS # BLD AUTO: 3.34 THOUSANDS/ÂΜL (ref 1.85–7.62)
NEUTS SEG NFR BLD AUTO: 51 % (ref 43–75)
NITRITE UR QL STRIP: NEGATIVE
NRBC BLD AUTO-RTO: 0 /100 WBCS
PH UR STRIP.AUTO: 7 [PH]
PLATELET # BLD AUTO: 271 THOUSANDS/UL (ref 149–390)
PMV BLD AUTO: 9 FL (ref 8.9–12.7)
POTASSIUM SERPL-SCNC: 3.9 MMOL/L (ref 3.5–5.3)
PROT SERPL-MCNC: 7.8 G/DL (ref 6.4–8.4)
PROT UR STRIP-MCNC: NEGATIVE MG/DL
RBC # BLD AUTO: 4.82 MILLION/UL (ref 3.81–5.12)
SODIUM SERPL-SCNC: 136 MMOL/L (ref 135–147)
SP GR UR STRIP.AUTO: <1.005 (ref 1–1.03)
UROBILINOGEN UR STRIP-ACNC: <2 MG/DL
WBC # BLD AUTO: 6.54 THOUSAND/UL (ref 4.31–10.16)

## 2022-12-25 RX ORDER — KETOROLAC TROMETHAMINE 30 MG/ML
30 INJECTION, SOLUTION INTRAMUSCULAR; INTRAVENOUS ONCE
Status: COMPLETED | OUTPATIENT
Start: 2022-12-25 | End: 2022-12-25

## 2022-12-25 RX ADMIN — SODIUM CHLORIDE 1000 ML: 0.9 INJECTION, SOLUTION INTRAVENOUS at 12:54

## 2022-12-25 RX ADMIN — KETOROLAC TROMETHAMINE 30 MG: 30 INJECTION, SOLUTION INTRAMUSCULAR; INTRAVENOUS at 12:54

## 2022-12-25 RX ADMIN — IOHEXOL 100 ML: 350 INJECTION, SOLUTION INTRAVENOUS at 15:34
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
## 2023-01-06 ENCOUNTER — TELEPHONE (OUTPATIENT)
Dept: OBGYN CLINIC | Facility: MEDICAL CENTER | Age: 33
End: 2023-01-06

## 2023-01-06 DIAGNOSIS — F32.A DEPRESSION AFFECTING PREGNANCY: ICD-10-CM

## 2023-01-06 DIAGNOSIS — O99.340 DEPRESSION AFFECTING PREGNANCY: ICD-10-CM

## 2023-01-06 NOTE — TELEPHONE ENCOUNTER
Patient called about a refill for sertraline  Pharmacy on file  ([facility]) Please review when you get a chance   Thank you

## 2023-01-23 DIAGNOSIS — G43.009 MIGRAINE WITHOUT AURA AND WITHOUT STATUS MIGRAINOSUS, NOT INTRACTABLE: Primary | ICD-10-CM

## 2023-01-23 RX ORDER — RIMEGEPANT SULFATE 75 MG/75MG
TABLET, ORALLY DISINTEGRATING ORAL
Qty: 8 TABLET | Refills: 12 | Status: SHIPPED | OUTPATIENT
Start: 2023-01-23 | End: 2023-02-03

## 2023-02-03 ENCOUNTER — TELEPHONE (OUTPATIENT)
Dept: NEUROLOGY | Facility: CLINIC | Age: 33
End: 2023-02-03

## 2023-02-03 DIAGNOSIS — G43.009 MIGRAINE WITHOUT AURA AND WITHOUT STATUS MIGRAINOSUS, NOT INTRACTABLE: Primary | ICD-10-CM

## 2023-02-03 NOTE — TELEPHONE ENCOUNTER
Pt called to reschedule due to a conflict in her schedule  I reached out to the CV office and they advised I can offer 2-6-23 at 330 pm and I told the pt and she accepted the appt

## 2023-02-03 NOTE — TELEPHONE ENCOUNTER
ADD ON - TODAY , With Dr Kaya Montoya, 12 pm, VV through [place] , [facility] office  Hello Pt stated she would like to discuss medications for migraines as everything given her is not working          Thank you all in advance,     [facility]

## 2023-02-03 NOTE — PROGRESS NOTES
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 see it      - MRI Brain 7/28/18 - normal   - MRI Brain 2/18/16 - normal        Review of Systems:   ROS obtained by medical assistant Personally reviewed and updated if indicated  I recommended PCP follow up for non neurologic problems  Review of Systems   Constitutional: Negative  Negative for appetite change and fever  HENT: Negative  Negative for hearing loss, tinnitus, trouble swallowing and voice change  Eyes: Negative  Negative for photophobia, pain and visual disturbance  Respiratory: Negative  Negative for shortness of breath  Cardiovascular: Negative  Negative for palpitations  Gastrointestinal: Negative  Negative for nausea and vomiting  Endocrine: Negative  Negative for cold intolerance  Genitourinary: Negative  Negative for dysuria, frequency and urgency  Musculoskeletal: Negative  Negative for gait problem, myalgias and neck pain  Skin: Negative  Negative for rash  Allergic/Immunologic: Negative  Neurological: Positive for headaches  Negative for dizziness, tremors, seizures, syncope, facial asymmetry, speech difficulty, weakness, light-headedness and numbness  Hematological: Negative  Does not bruise/bleed easily  Psychiatric/Behavioral: Negative  Negative for confusion, hallucinations and sleep disturbance  All other systems reviewed and are negative  I have spent 25 minutes with Patient today in which greater than 50% of this time was spent in counseling/coordination of care  I also spent 17 minutes non face to face for this patient the same day

## 2023-02-06 ENCOUNTER — TELEMEDICINE (OUTPATIENT)
Dept: NEUROLOGY | Facility: CLINIC | Age: 33
End: 2023-02-06

## 2023-02-06 DIAGNOSIS — G43.009 MIGRAINE WITHOUT AURA AND WITHOUT STATUS MIGRAINOSUS, NOT INTRACTABLE: Primary | ICD-10-CM

## 2023-02-06 RX ORDER — DEXAMETHASONE 2 MG/1
TABLET ORAL
Qty: 5 TABLET | Refills: 0 | Status: SHIPPED | OUTPATIENT
Start: 2023-02-06

## 2023-02-06 NOTE — PROGRESS NOTES
Review of Systems   Constitutional: Negative  Negative for appetite change and fever  HENT: Negative  Negative for hearing loss, tinnitus, trouble swallowing and voice change  Eyes: Negative  Negative for photophobia, pain and visual disturbance  Respiratory: Negative  Negative for shortness of breath  Cardiovascular: Negative  Negative for palpitations  Gastrointestinal: Negative  Negative for nausea and vomiting  Endocrine: Negative  Negative for cold intolerance  Genitourinary: Negative  Negative for dysuria, frequency and urgency  Musculoskeletal: Negative  Negative for gait problem, myalgias and neck pain  Skin: Negative  Negative for rash  Allergic/Immunologic: Negative  Neurological: Positive for headaches  Negative for dizziness, tremors, seizures, syncope, facial asymmetry, speech difficulty, weakness, light-headedness and numbness  Hematological: Negative  Does not bruise/bleed easily  Psychiatric/Behavioral: Negative  Negative for confusion, hallucinations and sleep disturbance  All other systems reviewed and are negative

## 2023-02-06 NOTE — PATIENT INSTRUCTIONS
Headache/migraine treatment:   Abortive medications (for immediate treatment of a headache): Ok to take ibuprofen or acetaminophen for headaches, but try to limit the amount and frequency that you are taking to avoid medication overuse/rebound headache   - no change to current meds, but try to take no more than 3 days per week     Can take either nurtec or ubrelvy at migraine onset  Can take nurtec as preventative     For back up:     dexamethasone (DECADRON) 2 mg tablet; One tab with breakfast for 1-5 days for unrelenting migraine       Prescription preventive medications for headaches/migraines   (to take every day to help prevent headaches - not to take at the time of headache):  Continue ajovy monthly        Lifestyle Recommendations:  - Maintain good sleep hygiene  Going to bed and waking up at consistent times, avoiding excessive daytime naps, avoiding caffeinated beverages in the evening, avoid excessive stimulation in the evening and generally using bed primarily for sleeping  One hour before bedtime would recommend turning lights down lower, decreasing your activity (may read quietly, listen to music at a low volume)  When you get into bed, should eliminate all technology (no texting, emailing, playing with your phone, iPad or tablet in bed)  - Maintain good hydration  Drink  2L of fluid a day (4 typical small water bottles)  - Maintain good nutrition  In particular don't skip meals and eat balanced meals regularly  Education and Follow-up  - Please contact us if any questions or concerns arise  Of course, try to protect yourself from head injuries, and if any new concerning symptoms or significant blow to the head or body go to the emergency department    - Follow up 5/23/23, sooner if needed

## 2023-03-30 ENCOUNTER — OFFICE VISIT (OUTPATIENT)
Dept: OBGYN CLINIC | Facility: CLINIC | Age: 33
End: 2023-03-30

## 2023-03-30 VITALS
DIASTOLIC BLOOD PRESSURE: 76 MMHG | HEIGHT: 63 IN | BODY MASS INDEX: 26.22 KG/M2 | WEIGHT: 148 LBS | SYSTOLIC BLOOD PRESSURE: 118 MMHG

## 2023-03-30 DIAGNOSIS — Z01.419 ENCOUNTER FOR GYNECOLOGICAL EXAMINATION: Primary | ICD-10-CM
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
## 2023-05-16 ENCOUNTER — TELEPHONE (OUTPATIENT)
Dept: NEUROLOGY | Facility: CLINIC | Age: 33
End: 2023-05-16

## 2023-05-16 NOTE — TELEPHONE ENCOUNTER
Called and spoke to patient to confirm their upcoming appointment with Dr Evette Collins  Informed patient about arriving in the [place] location 15 minutes prior to their appointment to get checked in and going over chart

## 2023-05-23 ENCOUNTER — TELEMEDICINE (OUTPATIENT)
Dept: NEUROLOGY | Facility: CLINIC | Age: 33
End: 2023-05-23

## 2023-05-23 DIAGNOSIS — G43.009 MIGRAINE WITHOUT AURA AND WITHOUT STATUS MIGRAINOSUS, NOT INTRACTABLE: Primary | ICD-10-CM

## 2023-05-23 RX ORDER — RIMEGEPANT SULFATE 75 MG/75MG
75 TABLET, ORALLY DISINTEGRATING ORAL ONCE
COMMUNITY

## 2023-05-23 NOTE — PROGRESS NOTES
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     Mental status/cognitive function:   Recent and remote memory appear intact  Attention span and concentration as well as fund of knowledge appear appropriate for age  Normal language and spontaneous speech  Cranial Nerves:  VII-facial expression symmetric  Motor Exam: symmetric bulk throughout  no atrophy, fasciculations or abnormal movements noted  Coordination:  no apparent dysmetria, ataxia or tremor noted       Pertinent lab results:   See EMR for recent labs  01/29/2020 CMP and CBC unremarkable, TSH 1 9  Last routine labs 10/25/2017 notable for sodium 135, alkaline phosphatase 152  04/17/2019 CBC unremarkable     Imaging:   No head imaging noted in system  She  Brought in images that were up loaded  -MRI C-spine 07/28/2015:  Small syrinx from C1-C7, otherwise negative exam, no disc herniation or spinal stenosis  - repeat in 2016 - didn't see it      - MRI Brain 7/28/18 - normal   - MRI Brain 2/18/16 - normal         Review of Systems:   ROS obtained by medical assistant Personally reviewed and updated if indicated  I recommended PCP follow up for non neurologic problems      Review of Systems Constitutional: Negative for appetite change and fever  HENT: Negative  Negative for hearing loss, tinnitus, trouble swallowing and voice change  Eyes: Negative  Negative for photophobia and pain  Respiratory: Negative  Negative for shortness of breath  Cardiovascular: Negative  Negative for palpitations  Gastrointestinal: Negative  Negative for nausea and vomiting  Endocrine: Negative  Negative for cold intolerance  Genitourinary: Negative  Negative for dysuria, frequency and urgency  Musculoskeletal: Negative  Negative for myalgias and neck pain  Skin: Negative  Negative for rash  Neurological: Positive for headaches  Negative for dizziness, tremors, seizures, syncope, facial asymmetry, speech difficulty, weakness, light-headedness and numbness  Hematological: Negative  Does not bruise/bleed easily  Psychiatric/Behavioral: Negative  Negative for confusion, hallucinations and sleep disturbance  All other systems reviewed and are negative  I have spent 11 minutes with Patient today in which greater than 50% of this time was spent in counseling/coordination of care regarding Prognosis, Instructions for management, Impressions, Counseling / Coordination of care, Documenting in the medical record and Obtaining or reviewing history    I also spent 10 minutes non face to face for this patient the same day             Visit Time  Total Visit Duration: 21

## 2023-05-23 NOTE — PATIENT INSTRUCTIONS
Headache/migraine treatment:   Abortive medications (for immediate treatment of a headache): Ok to take ibuprofen or acetaminophen for headaches, but try to limit the amount and frequency that you are taking to avoid medication overuse/rebound headache   - no change to current meds, but try to take no more than 3 days per week     Can take either nurtec or ubrelvy at migraine onset  Can take nurtec as preventative     For back up:     dexamethasone (DECADRON) 2 mg tablet; One tab with breakfast for 1-5 days for unrelenting migraine       Prescription preventive medications for headaches/migraines   (to take every day to help prevent headaches - not to take at the time of headache):  Continue ajovy monthly        Lifestyle Recommendations:  - Maintain good sleep hygiene  Going to bed and waking up at consistent times, avoiding excessive daytime naps, avoiding caffeinated beverages in the evening, avoid excessive stimulation in the evening and generally using bed primarily for sleeping  One hour before bedtime would recommend turning lights down lower, decreasing your activity (may read quietly, listen to music at a low volume)  When you get into bed, should eliminate all technology (no texting, emailing, playing with your phone, iPad or tablet in bed)  - Maintain good hydration  Drink  2L of fluid a day (4 typical small water bottles)  - Maintain good nutrition  In particular don't skip meals and eat balanced meals regularly  Education and Follow-up  - Please contact us if any questions or concerns arise  Of course, try to protect yourself from head injuries, and if any new concerning symptoms or significant blow to the head or body go to the emergency department    - Follow up 6 months, sooner if needed

## 2023-07-14 ENCOUNTER — TELEPHONE (OUTPATIENT)
Dept: OBGYN CLINIC | Facility: MEDICAL CENTER | Age: 33
End: 2023-07-14

## 2023-07-14 NOTE — TELEPHONE ENCOUNTER
Contacted patient to discuss irregularity with her period. This is the first time that this has occurred. She has no other symptoms besides breakthrough bleeding. Advised patient to monitor her symptoms with her next cycle. If she gets her period twice in one month again, she should reach out to the office and we can schedule her an appointment to discuss the irregularity. Patient will call if her bleeding begins to get heavier.   Acknowledges plan and will contact office as needed

## 2023-07-14 NOTE — TELEPHONE ENCOUNTER
Patient called about some irregular periods that she is experiencing. Patient had her menstrual cycle about two weeks ago and is now experiencing it again within one month. Patient would like to speak to someone about their concerned. Please review when you get a chance.  Thank you

## 2023-08-04 DIAGNOSIS — G43.009 MIGRAINE WITHOUT AURA AND WITHOUT STATUS MIGRAINOSUS, NOT INTRACTABLE: ICD-10-CM

## 2023-08-04 RX ORDER — FREMANEZUMAB-VFRM 225 MG/1.5ML
INJECTION SUBCUTANEOUS
Qty: 1 ML | Refills: 11 | Status: SHIPPED | OUTPATIENT
Start: 2023-08-04

## 2023-11-20 ENCOUNTER — TELEPHONE (OUTPATIENT)
Dept: NEUROLOGY | Facility: CLINIC | Age: 33
End: 2023-11-20

## 2023-11-20 NOTE — TELEPHONE ENCOUNTER
Called and spoke to patient to confirm their upcoming appointment with Dr. Steffanie Rivera. Informed patient about calling 15 minutes prior to their appointment to get checked in and going over chart.

## 2023-12-12 DIAGNOSIS — O99.340 DEPRESSION AFFECTING PREGNANCY: ICD-10-CM

## 2023-12-12 DIAGNOSIS — F32.A DEPRESSION AFFECTING PREGNANCY: ICD-10-CM

## 2024-02-05 ENCOUNTER — OFFICE VISIT (OUTPATIENT)
Dept: OBGYN CLINIC | Facility: CLINIC | Age: 34
End: 2024-02-05
Payer: COMMERCIAL

## 2024-02-05 VITALS
SYSTOLIC BLOOD PRESSURE: 115 MMHG | WEIGHT: 156.6 LBS | BODY MASS INDEX: 27.75 KG/M2 | HEIGHT: 63 IN | DIASTOLIC BLOOD PRESSURE: 75 MMHG

## 2024-02-05 DIAGNOSIS — Z86.19 H/O HERPES LABIALIS: ICD-10-CM

## 2024-02-05 DIAGNOSIS — L29.0 ANAL ITCHING: ICD-10-CM

## 2024-02-05 DIAGNOSIS — N89.8 VAGINAL IRRITATION: Primary | ICD-10-CM

## 2024-02-05 PROCEDURE — 99214 OFFICE O/P EST MOD 30 MIN: CPT | Performed by: OBSTETRICS & GYNECOLOGY

## 2024-02-05 PROCEDURE — 87510 GARDNER VAG DNA DIR PROBE: CPT | Performed by: OBSTETRICS & GYNECOLOGY

## 2024-02-05 PROCEDURE — 87660 TRICHOMONAS VAGIN DIR PROBE: CPT | Performed by: OBSTETRICS & GYNECOLOGY

## 2024-02-05 PROCEDURE — 87480 CANDIDA DNA DIR PROBE: CPT | Performed by: OBSTETRICS & GYNECOLOGY

## 2024-02-05 RX ORDER — FLUCONAZOLE 150 MG/1
150 TABLET ORAL WEEKLY
Qty: 24 TABLET | Refills: 0 | Status: SHIPPED | OUTPATIENT
Start: 2024-02-05 | End: 2024-07-16

## 2024-02-05 RX ORDER — ACYCLOVIR 400 MG/1
400 TABLET ORAL AS NEEDED
Qty: 30 TABLET | Refills: 3 | Status: SHIPPED | OUTPATIENT
Start: 2024-02-05 | End: 2024-02-15
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
## 2024-02-06 LAB
CANDIDA RRNA VAG QL PROBE: NOT DETECTED
G VAGINALIS RRNA GENITAL QL PROBE: NOT DETECTED
T VAGINALIS RRNA GENITAL QL PROBE: NOT DETECTED

## 2024-03-01 DIAGNOSIS — G43.009 MIGRAINE WITHOUT AURA AND WITHOUT STATUS MIGRAINOSUS, NOT INTRACTABLE: ICD-10-CM

## 2024-03-01 RX ORDER — UBROGEPANT 100 MG/1
TABLET ORAL
Qty: 10 TABLET | Refills: 12 | Status: SHIPPED | OUTPATIENT
Start: 2024-03-01

## 2024-04-02 ENCOUNTER — TELEPHONE (OUTPATIENT)
Dept: NEUROLOGY | Facility: CLINIC | Age: 34
End: 2024-04-02

## 2024-04-02 NOTE — TELEPHONE ENCOUNTER
ADD ON    Pt called to make f/u appt with Dr. Montoya LOV 5/2023  Appt made for 4/3/24 @ 1:00 with Dr. Montoya via [employer]

## 2024-04-03 ENCOUNTER — TELEMEDICINE (OUTPATIENT)
Dept: NEUROLOGY | Facility: CLINIC | Age: 34
End: 2024-04-03
Payer: COMMERCIAL

## 2024-04-03 ENCOUNTER — TELEPHONE (OUTPATIENT)
Dept: NEUROLOGY | Facility: CLINIC | Age: 34
End: 2024-04-03

## 2024-04-03 DIAGNOSIS — R29.818 SUSPECTED SLEEP APNEA: ICD-10-CM

## 2024-04-03 DIAGNOSIS — G43.009 MIGRAINE WITHOUT AURA AND WITHOUT STATUS MIGRAINOSUS, NOT INTRACTABLE: Primary | ICD-10-CM

## 2024-04-03 PROCEDURE — 99215 OFFICE O/P EST HI 40 MIN: CPT | Performed by: PSYCHIATRY & NEUROLOGY
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
## 2024-04-03 NOTE — TELEPHONE ENCOUNTER
Called patient and I left a voicemail regarding scheduling next f/u with Dr villatoro in 3-4 months (July- aug 2024). A copy of the AVS was placed in the mail to patient

## 2024-04-03 NOTE — PATIENT INSTRUCTIONS
"  -If you feel like you could sleep on your back that night only, certainly could try to sleep on your back for the sleep study, but not if you feel like you cannot sleep this way.  Just getting sleep is the most important thing, as the machine thinks you are sleeping the entire time it is plug in. Otherwise we discussed home sleep study can underestimate the problem and try not to plug in the machine until you are just about to fall asleep.  If it comes back that you almost have sleep apnea or other sleep disorder, but not meeting criteria, we could consider in lab study and reach out to me if you would like this ordered before next visit.    I am placing a referral for a sleep study and if it is abnormal we will place one to the sleep medicine specialist team to further evaluate your sleep issues.    Please call [phone] to schedule the sleep study and we discussed due to the new order being labeled \" ambulatory referral to sleep medicine\" you have to wait 2 days for the sleep team to turn this referral into the actual sleep study that you can schedule, otherwise if you call now they will say there is no order for sleep study, just an order for a referral, because since the update even the scheduling team does not all realize that the order is included in the referral to sleep medicine.  The referral to sleep medicine piece is only if there is abnormalities and you need to see them afterwards.  Hopefully they fix this issue soon.  After the sleep study is completed if there is abnormal results that need treatment, I recommend you see one of the following providers:  Joseluis López DO   Tanvir Hardwick PA-C        Headache/migraine treatment:   Abortive medications (for immediate treatment of a headache): Ok to take ibuprofen or acetaminophen for headaches, but try to limit the amount and frequency that you are " taking to avoid medication overuse/rebound headache.  - no change to current meds, but try to take no more than 3 days per week     Can take either nurtec or ubrelvy at migraine onset  Can take nurtec as preventative     For back up:     dexamethasone (DECADRON) 2 mg tablet; One tab with breakfast for 1-5 days for unrelenting migraine       Prescription preventive medications for headaches/migraines   (to take every day to help prevent headaches - not to take at the time of headache):  Continue ajovy monthly        Lifestyle Recommendations:  - Maintain good sleep hygiene.  Going to bed and waking up at consistent times, avoiding excessive daytime naps, avoiding caffeinated beverages in the evening, avoid excessive stimulation in the evening and generally using bed primarily for sleeping.  One hour before bedtime would recommend turning lights down lower, decreasing your activity (may read quietly, listen to music at a low volume). When you get into bed, should eliminate all technology (no texting, emailing, playing with your phone, iPad or tablet in bed).  - Maintain good hydration. Drink  2L of fluid a day (4 typical small water bottles)  - Maintain good nutrition. In particular don't skip meals and eat balanced meals regularly.        Education and Follow-up  - Please contact us if any questions or concerns arise. Of course, try to protect yourself from head injuries, and if any new concerning symptoms or significant blow to the head or body go to the emergency department.  - Follow up 3-4 months, sooner if needed  Certainly if sleep study is not yet completed in 3 to 4 months and you are doing well you can push it back to 6-months

## 2024-04-04 DIAGNOSIS — R29.818 SUSPECTED SLEEP APNEA: Primary | ICD-10-CM

## 2024-05-30 ENCOUNTER — ANNUAL EXAM (OUTPATIENT)
Dept: OBGYN CLINIC | Facility: MEDICAL CENTER | Age: 34
End: 2024-05-30
Payer: COMMERCIAL

## 2024-05-30 VITALS
WEIGHT: 147 LBS | BODY MASS INDEX: 26.05 KG/M2 | DIASTOLIC BLOOD PRESSURE: 66 MMHG | HEIGHT: 63 IN | SYSTOLIC BLOOD PRESSURE: 116 MMHG

## 2024-05-30 DIAGNOSIS — Z01.419 ENCOUNTER FOR GYNECOLOGICAL EXAMINATION: Primary | ICD-10-CM

## 2024-05-30 PROCEDURE — 99395 PREV VISIT EST AGE 18-39: CPT | Performed by: OBSTETRICS & GYNECOLOGY
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
## 2024-08-09 ENCOUNTER — NURSE TRIAGE (OUTPATIENT)
Age: 34
End: 2024-08-09

## 2024-08-09 NOTE — TELEPHONE ENCOUNTER
"Pt called in with concerns for breast pain. States this has been an ongoing issue, she normally has pain left breast one week prior to period coming. States her LMP was 7/17/24, she is still having pain in her left breast/armpit area. States that she does get upper body massages and is unsure if maybe she is just sore from that. Denies any lumps, discoloration, rash, discharge from nipple. Pt also would like to discuss birth control options. Pt given appointment for 8/19/24 and is thankful. She is aware to call back if any breast symptoms worsen in the meantime.       Reason for Disposition   Breast pain and cause is not known    Answer Assessment - Initial Assessment Questions  1. SYMPTOM: \"What's the main symptom you're concerned about?\"  (e.g., lump, pain, rash, nipple discharge)      Left breast pain  2. LOCATION: \"Where is the symptom located?\"      Left breast/arm pit  3. ONSET: \"When did symptoms  start?\"      Around 7/17/24  4. PRIOR HISTORY: \"Do you have any history of prior problems with your breasts?\" (e.g., lumps, cancer, fibrocystic breast disease)      denies  5. CAUSE: \"What do you think is causing this symptom?\"      Unsure, PMS vs muscle soreness  6. OTHER SYMPTOMS: \"Do you have any other symptoms?\" (e.g., fever, breast pain, redness or rash, nipple discharge)      denies    Protocols used: Breast Symptoms-ADULT-[place]    "

## 2024-08-17 DIAGNOSIS — G43.009 MIGRAINE WITHOUT AURA AND WITHOUT STATUS MIGRAINOSUS, NOT INTRACTABLE: ICD-10-CM

## 2024-08-19 ENCOUNTER — OFFICE VISIT (OUTPATIENT)
Dept: OBGYN CLINIC | Facility: CLINIC | Age: 34
End: 2024-08-19
Payer: COMMERCIAL

## 2024-08-19 VITALS
BODY MASS INDEX: 26.05 KG/M2 | WEIGHT: 147 LBS | DIASTOLIC BLOOD PRESSURE: 70 MMHG | HEIGHT: 63 IN | SYSTOLIC BLOOD PRESSURE: 118 MMHG

## 2024-08-19 DIAGNOSIS — N63.21 MASS OF UPPER OUTER QUADRANT OF LEFT BREAST: Primary | ICD-10-CM

## 2024-08-19 DIAGNOSIS — N64.4 BREAST TENDERNESS IN FEMALE: ICD-10-CM

## 2024-08-19 PROCEDURE — 99214 OFFICE O/P EST MOD 30 MIN: CPT | Performed by: OBSTETRICS & GYNECOLOGY

## 2024-08-20 ENCOUNTER — TELEPHONE (OUTPATIENT)
Age: 34
End: 2024-08-20

## 2024-08-20 DIAGNOSIS — G43.009 MIGRAINE WITHOUT AURA AND WITHOUT STATUS MIGRAINOSUS, NOT INTRACTABLE: ICD-10-CM

## 2024-08-20 RX ORDER — FREMANEZUMAB-VFRM 225 MG/1.5ML
INJECTION SUBCUTANEOUS
Qty: 1 ML | Refills: 11 | Status: CANCELLED | OUTPATIENT
Start: 2024-08-20

## 2024-08-20 RX ORDER — FREMANEZUMAB-VFRM 225 MG/1.5ML
INJECTION SUBCUTANEOUS
Qty: 4.5 ML | Refills: 11 | Status: SHIPPED | OUTPATIENT
Start: 2024-08-20

## 2024-08-20 NOTE — TELEPHONE ENCOUNTER
Pt calling in to get neurology clearance for getting an IUD Mirena placed , Her OBGYN wants to make sure it will not interact with current migraine medication.

## 2024-08-20 NOTE — TELEPHONE ENCOUNTER
Urgent PA initiated on CMM. (Key: [id number])   Unable to attach office notes-file is too large    Your PA request cannot be processed electronically.     Called PA dept [phone], spoke to Katalina and states that no PA required for 30 day or 90 days. She was able to run a test claim successfully. If pt has any issue, pharmacist can call the helpdesk at [phone].    Called [facility] pharmacy, spoke to Sherrie and advised of the below and above. She verbalized understanding. States that they are not requesting PA. They are requesting script as the previous .   Rx entered. Pls review and sign off    Medication: Ajovy    Dose/Frequency: 225 mg/1.5 ml soaj    Quantity: 1    Pharmacy: [facility]    Office:   [] PCP/Provider -   [x] Speciality/Provider -     Does the patient have enough for 3 days?   [] Yes   [] No - Send as HP to POD

## 2024-08-20 NOTE — TELEPHONE ENCOUNTER
Pt called in to request PA for Ajovy medication, Pt confirmed she has not received the medication yet due to PA not being obtained.
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
## 2024-09-05 ENCOUNTER — HOSPITAL ENCOUNTER (OUTPATIENT)
Dept: ULTRASOUND IMAGING | Facility: CLINIC | Age: 34
Discharge: HOME | End: 2024-09-05
Payer: COMMERCIAL

## 2024-09-05 ENCOUNTER — HOSPITAL ENCOUNTER (OUTPATIENT)
Dept: MAMMOGRAPHY | Facility: CLINIC | Age: 34
Discharge: HOME | End: 2024-09-05
Payer: COMMERCIAL

## 2024-09-05 VITALS — WEIGHT: 147 LBS | BODY MASS INDEX: 26.05 KG/M2 | HEIGHT: 63 IN

## 2024-09-05 DIAGNOSIS — N63.0 BREAST LUMP IN FEMALE: ICD-10-CM

## 2024-09-05 DIAGNOSIS — N63.21 MASS OF UPPER OUTER QUADRANT OF LEFT BREAST: ICD-10-CM

## 2024-09-05 PROCEDURE — G0279 TOMOSYNTHESIS, MAMMO: HCPCS

## 2024-09-05 PROCEDURE — 77066 DX MAMMO INCL CAD BI: CPT

## 2024-09-05 PROCEDURE — 76642 ULTRASOUND BREAST LIMITED: CPT

## 2024-10-04 ENCOUNTER — APPOINTMENT (OUTPATIENT)
Dept: LAB | Facility: HOSPITAL | Age: 34
End: 2024-10-04
Payer: COMMERCIAL

## 2024-10-04 ENCOUNTER — PROCEDURE VISIT (OUTPATIENT)
Age: 34
End: 2024-10-04
Payer: COMMERCIAL

## 2024-10-04 VITALS
DIASTOLIC BLOOD PRESSURE: 60 MMHG | BODY MASS INDEX: 28.17 KG/M2 | WEIGHT: 159 LBS | SYSTOLIC BLOOD PRESSURE: 110 MMHG | HEIGHT: 63 IN

## 2024-10-04 DIAGNOSIS — N97.0 ANOVULATION: ICD-10-CM

## 2024-10-04 DIAGNOSIS — N92.6 MISSED MENSES: Primary | ICD-10-CM

## 2024-10-04 LAB
B-HCG SERPL-ACNC: <0.6 MIU/ML (ref 0–5)
ESTRADIOL SERPL-MCNC: 115.6 PG/ML
FSH SERPL-ACNC: 2.5 MIU/ML
LH SERPL-ACNC: 6.3 MIU/ML
TSH SERPL DL<=0.05 MIU/L-ACNC: 2.35 UIU/ML (ref 0.45–4.5)

## 2024-10-04 PROCEDURE — 99213 OFFICE O/P EST LOW 20 MIN: CPT | Performed by: OBSTETRICS & GYNECOLOGY

## 2024-10-04 PROCEDURE — 82670 ASSAY OF TOTAL ESTRADIOL: CPT

## 2024-10-04 PROCEDURE — 84402 ASSAY OF FREE TESTOSTERONE: CPT

## 2024-10-04 PROCEDURE — 84443 ASSAY THYROID STIM HORMONE: CPT | Performed by: OBSTETRICS & GYNECOLOGY

## 2024-10-04 PROCEDURE — 83002 ASSAY OF GONADOTROPIN (LH): CPT

## 2024-10-04 PROCEDURE — 36415 COLL VENOUS BLD VENIPUNCTURE: CPT

## 2024-10-04 PROCEDURE — 83001 ASSAY OF GONADOTROPIN (FSH): CPT | Performed by: OBSTETRICS & GYNECOLOGY

## 2024-10-04 PROCEDURE — 84403 ASSAY OF TOTAL TESTOSTERONE: CPT

## 2024-10-04 PROCEDURE — 84702 CHORIONIC GONADOTROPIN TEST: CPT

## 2024-10-04 RX ORDER — NORGESTIMATE AND ETHINYL ESTRADIOL 0.25-0.035
1 KIT ORAL DAILY
Qty: 84 TABLET | Refills: 1 | Status: SHIPPED | OUTPATIENT
Start: 2024-10-04
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
## 2024-10-05 LAB
TESTOST FREE SERPL-MCNC: 5.6 PG/ML (ref 0–4.2)
TESTOST SERPL-MCNC: 20 NG/DL (ref 8–60)

## 2024-11-16 DIAGNOSIS — O99.340 DEPRESSION AFFECTING PREGNANCY: ICD-10-CM

## 2024-11-16 DIAGNOSIS — G43.009 MIGRAINE WITHOUT AURA AND WITHOUT STATUS MIGRAINOSUS, NOT INTRACTABLE: ICD-10-CM

## 2024-11-16 DIAGNOSIS — F32.A DEPRESSION AFFECTING PREGNANCY: ICD-10-CM

## 2024-11-16 DIAGNOSIS — N89.8 VAGINAL IRRITATION: ICD-10-CM

## 2024-11-20 RX ORDER — FLUCONAZOLE 150 MG/1
150 TABLET ORAL WEEKLY
Qty: 24 TABLET | Refills: 0 | Status: SHIPPED | OUTPATIENT
Start: 2024-11-20 | End: 2025-05-01

## 2024-11-20 RX ORDER — DEXAMETHASONE 4 MG/1
TABLET ORAL
Qty: 5 TABLET | Refills: 0 | Status: SHIPPED | OUTPATIENT
Start: 2024-11-20

## 2024-12-09 ENCOUNTER — TELEMEDICINE (OUTPATIENT)
Dept: NEUROLOGY | Facility: CLINIC | Age: 34
End: 2024-12-09
Payer: COMMERCIAL

## 2024-12-09 DIAGNOSIS — G43.009 MIGRAINE WITHOUT AURA AND WITHOUT STATUS MIGRAINOSUS, NOT INTRACTABLE: ICD-10-CM

## 2024-12-09 PROCEDURE — 99213 OFFICE O/P EST LOW 20 MIN: CPT | Performed by: PSYCHIATRY & NEUROLOGY

## 2024-12-09 RX ORDER — FREMANEZUMAB-VFRM 225 MG/1.5ML
INJECTION SUBCUTANEOUS
Qty: 4.5 ML | Refills: 11 | Status: SHIPPED | OUTPATIENT
Start: 2024-12-09

## 2024-12-09 NOTE — PATIENT INSTRUCTIONS
Certainly please let me know if you would ever want me to reorder the sleep study/if you were open to obtaining it to further evaluate for other contributing factors to headaches and migraines      Headache/migraine treatment:   Abortive medications (for immediate treatment of a headache): Ok to take ibuprofen or acetaminophen for headaches, but try to limit the amount and frequency that you are taking to avoid medication overuse/rebound headache.  - no change to current meds, but try to take no more than 3 days per week     Can take either nurtec or ubrelvy at migraine onset  Can take nurtec as preventative     For back up:     dexamethasone (DECADRON) 2 mg tablet; One tab with breakfast for 1-5 days for unrelenting migraine       Prescription preventive medications for headaches/migraines   (to take every day to help prevent headaches - not to take at the time of headache):  Continue ajovy monthly        Lifestyle Recommendations:  - Maintain good sleep hygiene.  Going to bed and waking up at consistent times, avoiding excessive daytime naps, avoiding caffeinated beverages in the evening, avoid excessive stimulation in the evening and generally using bed primarily for sleeping.  One hour before bedtime would recommend turning lights down lower, decreasing your activity (may read quietly, listen to music at a low volume). When you get into bed, should eliminate all technology (no texting, emailing, playing with your phone, iPad or tablet in bed).  - Maintain good hydration. Drink  2L of fluid a day (4 typical small water bottles)  - Maintain good nutrition. In particular don't skip meals and eat balanced meals regularly.        Education and Follow-up  - Please contact us if any questions or concerns arise. Of course, try to protect yourself from head injuries, and if any new concerning symptoms or significant blow to the head or body go to the emergency department.  - Follow up 1 year, sooner if needed

## 2024-12-09 NOTE — PROGRESS NOTES
Review of Systems   Constitutional: Negative.    HENT: Negative.     Eyes: Negative.    Respiratory: Negative.     Cardiovascular: Negative.    Gastrointestinal: Negative.    Endocrine: Negative.    Genitourinary: Negative.    Musculoskeletal: Negative.    Skin: Negative.    Allergic/Immunologic: Negative.    Neurological:  Positive for headaches.   Hematological: Negative.    Psychiatric/Behavioral: Negative.
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
## 2024-12-10 ENCOUNTER — TELEPHONE (OUTPATIENT)
Dept: NEUROLOGY | Facility: CLINIC | Age: 34
End: 2024-12-10

## 2024-12-10 ENCOUNTER — TELEPHONE (OUTPATIENT)
Age: 34
End: 2024-12-10

## 2024-12-10 NOTE — TELEPHONE ENCOUNTER
----- Message from Kaya Montoya MD sent at 12/9/2024  4:51 PM EST -----  Increasing her Ubrelvy to 16 tabs a month if you could help with prior authorization if needing to be updated.  I do not know if it would be needed for sure as but not typically covers these meds well

## 2024-12-10 NOTE — TELEPHONE ENCOUNTER
Called [facility], spoke to Santhosh. I was transferred to their dedicated team. Spoke to Lois and states that Nurtec 16 tabs was processed/filled on 12/9/24. No PA is required.

## 2024-12-10 NOTE — TELEPHONE ENCOUNTER
Ubrelvy PA initiated on CMM. (Key: [id number])  Please advise the dispensing pharmacy to contact the Pharmacy Help Line at [phone] for assistance.     Will call pharmacy after 0900

## 2024-12-10 NOTE — TELEPHONE ENCOUNTER
Called patient to schedule a one-year follow-up with Dr. Montoya 12/2025).  Patient stated that she will call back in January to schedule appointment once her 's schedule becomes available.

## 2025-02-15 DIAGNOSIS — O99.340 DEPRESSION AFFECTING PREGNANCY: ICD-10-CM

## 2025-02-15 DIAGNOSIS — F32.A DEPRESSION AFFECTING PREGNANCY: ICD-10-CM
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
## 2025-05-14 DIAGNOSIS — F32.A DEPRESSION AFFECTING PREGNANCY: ICD-10-CM

## 2025-05-14 DIAGNOSIS — O99.340 DEPRESSION AFFECTING PREGNANCY: ICD-10-CM

## 2025-06-11 DIAGNOSIS — F32.A DEPRESSION AFFECTING PREGNANCY: ICD-10-CM

## 2025-06-11 DIAGNOSIS — O99.340 DEPRESSION AFFECTING PREGNANCY: ICD-10-CM

## 2025-06-14 DIAGNOSIS — G43.009 MIGRAINE WITHOUT AURA AND WITHOUT STATUS MIGRAINOSUS, NOT INTRACTABLE: ICD-10-CM

## 2025-06-16 NOTE — TELEPHONE ENCOUNTER
Ubrogepant (Ubrelvy) 100 MG tablet, was sent to the [facility]/pharmacy #[id number] - [place], [place] - [address] on 12/9/24 with a qty of 16 tablets with 12 refills.     Messaged patient on mychart

## 2025-06-17 DIAGNOSIS — F41.9 ANXIETY: ICD-10-CM

## 2025-06-17 DIAGNOSIS — F41.9 ANXIETY: Primary | ICD-10-CM

## (undated) DEVICE — PREMIUM DRY TRAY LF: Brand: MEDLINE INDUSTRIES, INC.

## (undated) DEVICE — ULTRASOUND GEL STERILE FOIL PK

## (undated) DEVICE — SPONGE 4 X 4 XRAY 16 PLY STRL LF RFD

## (undated) DEVICE — GLOVE PI ULTRA TOUCH SZ.6.5

## (undated) DEVICE — SUT VICRYL 3-0 SH 27 IN J416H

## (undated) DEVICE — SCD SEQUENTIAL COMPRESSION COMFORT SLEEVE MEDIUM KNEE LENGTH: Brand: KENDALL SCD

## (undated) DEVICE — DRAPE PROBE NEO-PROBE/ULTRASOUND

## (undated) DEVICE — TRAY FOLEY 16FR URIMETER SILICONE SURESTEP

## (undated) DEVICE — GLOVE PI ULTRA TOUCH SZ.7.0

## (undated) DEVICE — GLOVE INDICATOR PI UNDERGLOVE SZ 7 BLUE

## (undated) DEVICE — TUBE INFLOW OUTFLOW Y TUBING F/FLUID CONTROL SYSTEM AQUILEX

## (undated) DEVICE — STERILE 8 INCH PROCTO SWAB: Brand: CARDINAL HEALTH

## (undated) DEVICE — STRL ALLENTOWN HYSTEROSCOPY PK: Brand: CARDINAL HEALTH

## (undated) DEVICE — DEVICE MYOSURE LITE TISSUE REMOVAL HYSTEROSCOPIC

## (undated) DEVICE — TUBE INFLOW F/FLUID CONTROL SYSTEM AQUILEX

## (undated) DEVICE — CYSTO TUBING SINGLE IRRIGATION

## (undated) DEVICE — SKIN MARKER DUAL TIP WITH RULER CAP, FLEXIBLE RULER AND LABELS: Brand: DEVON

## (undated) DEVICE — MYOSURE SEAL SET

## (undated) DEVICE — 3000CC GUARDIAN II: Brand: GUARDIAN

## (undated) DEVICE — 2000CC GUARDIAN II: Brand: GUARDIAN

## (undated) DEVICE — GLOVE INDICATOR PI UNDERGLOVE SZ 7.5 BLUE

## (undated) DEVICE — PVC URETHRAL CATHETER: Brand: DOVER